# Patient Record
Sex: FEMALE | Race: WHITE | NOT HISPANIC OR LATINO | Employment: OTHER | URBAN - METROPOLITAN AREA
[De-identification: names, ages, dates, MRNs, and addresses within clinical notes are randomized per-mention and may not be internally consistent; named-entity substitution may affect disease eponyms.]

---

## 2017-03-07 ENCOUNTER — AMBULATORY - HEALTHEAST (OUTPATIENT)
Dept: CARE COORDINATION | Facility: CLINIC | Age: 82
End: 2017-03-07

## 2017-04-27 ENCOUNTER — COMMUNICATION - HEALTHEAST (OUTPATIENT)
Dept: SCHEDULING | Facility: CLINIC | Age: 82
End: 2017-04-27

## 2017-05-21 ENCOUNTER — COMMUNICATION - HEALTHEAST (OUTPATIENT)
Dept: FAMILY MEDICINE | Facility: CLINIC | Age: 82
End: 2017-05-21

## 2017-05-21 DIAGNOSIS — I10 UNSPECIFIED ESSENTIAL HYPERTENSION: ICD-10-CM

## 2017-05-21 DIAGNOSIS — E78.5 HYPERLIPIDEMIA: ICD-10-CM

## 2017-08-22 ENCOUNTER — COMMUNICATION - HEALTHEAST (OUTPATIENT)
Dept: FAMILY MEDICINE | Facility: CLINIC | Age: 82
End: 2017-08-22

## 2017-08-22 DIAGNOSIS — E78.5 HYPERLIPIDEMIA: ICD-10-CM

## 2017-08-22 DIAGNOSIS — I10 UNSPECIFIED ESSENTIAL HYPERTENSION: ICD-10-CM

## 2017-08-23 RX ORDER — HYDROCHLOROTHIAZIDE 25 MG/1
TABLET ORAL
Qty: 30 TABLET | Refills: 0 | Status: SHIPPED | OUTPATIENT
Start: 2017-08-23 | End: 2021-09-07

## 2017-09-07 ENCOUNTER — RECORDS - HEALTHEAST (OUTPATIENT)
Dept: ADMINISTRATIVE | Facility: OTHER | Age: 82
End: 2017-09-07

## 2019-04-01 ENCOUNTER — OFFICE VISIT - HEALTHEAST (OUTPATIENT)
Dept: CARDIOLOGY | Facility: CLINIC | Age: 84
End: 2019-04-01

## 2019-04-01 DIAGNOSIS — I48.0 PAROXYSMAL ATRIAL FIBRILLATION (H): ICD-10-CM

## 2019-04-01 ASSESSMENT — MIFFLIN-ST. JEOR: SCORE: 1045.04

## 2019-04-02 LAB
ATRIAL RATE - MUSE: 71 BPM
DIASTOLIC BLOOD PRESSURE - MUSE: NORMAL MMHG
INTERPRETATION ECG - MUSE: NORMAL
P AXIS - MUSE: -20 DEGREES
PR INTERVAL - MUSE: 148 MS
QRS DURATION - MUSE: 92 MS
QT - MUSE: 398 MS
QTC - MUSE: 432 MS
R AXIS - MUSE: 5 DEGREES
SYSTOLIC BLOOD PRESSURE - MUSE: NORMAL MMHG
T AXIS - MUSE: 51 DEGREES
VENTRICULAR RATE- MUSE: 71 BPM

## 2019-07-23 ENCOUNTER — RECORDS - HEALTHEAST (OUTPATIENT)
Dept: ADMINISTRATIVE | Facility: OTHER | Age: 84
End: 2019-07-23

## 2021-05-01 ENCOUNTER — HEALTH MAINTENANCE LETTER (OUTPATIENT)
Age: 86
End: 2021-05-01

## 2021-05-25 ENCOUNTER — RECORDS - HEALTHEAST (OUTPATIENT)
Dept: ADMINISTRATIVE | Facility: CLINIC | Age: 86
End: 2021-05-25

## 2021-05-26 ENCOUNTER — RECORDS - HEALTHEAST (OUTPATIENT)
Dept: ADMINISTRATIVE | Facility: CLINIC | Age: 86
End: 2021-05-26

## 2021-05-27 ENCOUNTER — RECORDS - HEALTHEAST (OUTPATIENT)
Dept: ADMINISTRATIVE | Facility: CLINIC | Age: 86
End: 2021-05-27

## 2021-05-28 ENCOUNTER — RECORDS - HEALTHEAST (OUTPATIENT)
Dept: ADMINISTRATIVE | Facility: CLINIC | Age: 86
End: 2021-05-28

## 2021-05-29 ENCOUNTER — RECORDS - HEALTHEAST (OUTPATIENT)
Dept: ADMINISTRATIVE | Facility: CLINIC | Age: 86
End: 2021-05-29

## 2021-05-30 ENCOUNTER — RECORDS - HEALTHEAST (OUTPATIENT)
Dept: ADMINISTRATIVE | Facility: CLINIC | Age: 86
End: 2021-05-30

## 2021-05-31 ENCOUNTER — RECORDS - HEALTHEAST (OUTPATIENT)
Dept: ADMINISTRATIVE | Facility: CLINIC | Age: 86
End: 2021-05-31

## 2021-06-01 ENCOUNTER — RECORDS - HEALTHEAST (OUTPATIENT)
Dept: ADMINISTRATIVE | Facility: CLINIC | Age: 86
End: 2021-06-01

## 2021-06-02 VITALS — WEIGHT: 154 LBS | HEIGHT: 60 IN | BODY MASS INDEX: 30.23 KG/M2

## 2021-06-16 PROBLEM — I48.91 ATRIAL FIBRILLATION (H): Status: ACTIVE | Noted: 2019-02-18

## 2021-06-27 NOTE — PROGRESS NOTES
Progress Notes by Whit Jacobo MD at 4/1/2019  1:30 PM     Author: Whit Jacobo MD Service: -- Author Type: Physician    Filed: 4/1/2019  1:40 PM Encounter Date: 4/1/2019 Status: Signed    : Whit Jacobo MD (Physician)                  [email or web address]  [phone]         Thank you Dr. Brown for asking the [facility] team to participate in the care of your patient, Tia Brooke.     Impression and Plan     1.  Atrial fibrillation (paroxysmal).  Patient developed a transient episode of atrial fibrillation mid February 2019 in setting of exacerbation of asthma/reactive airway disease.  Patient states she had an episode of atrial fibrillation approximately 2 years prior.  Patient/family has been reluctant to pursue full anticoagulation due to instability concerns and advanced age.  Patient has had no subjective recurrence and does indicate to me that she can pretty much tell when she has the atrial fibrillation.  Twelve-lead ECG today confirms maintenance of sinus rhythm.  At this time, we will plan on seeing Emma on an as-needed basis.  She and her daughter, Rufina, are comfortable with this plan.         History of Present Illness    Once again I would like to thank you again for asking me to participate in the care of your patient, Tia Brooke.  As you know, but to reiterate for my own records, Tia Brooke is a 87 y.o. female with history of paroxysmal atrial fibrillation.  Patient developed a transient episode of atrial fibrillation mid February 2019 in setting of exacerbation of asthma/reactive airway disease.    Patient reports no subjective recurrence.  She does indicate to me that she can pretty much tell when she is in the rhythm disturbance.  She states her breathing is comfortable.  She reports no lightheadedness.  Denies chest pain.    Further review of systems is otherwise negative/noncontributory (medical record and 13  point review of systems reviewed as well and pertinent positives noted).         Cardiac Diagnostics      Echocardiogram 9 January 2019 (performed at [facility]).  1. Normal left ventricular size and systolic performance with ejection fraction of 60-65%.  2. Mild ventricular septal hypertrophy.  3. Mild aortic insufficiency.  4. Normal right ventricular size and systolic performance.  5. Normal atrial dimensions.     Twelve-lead ECG (personally reviewed) 1 April 2019: Sinus rhythm with solitary PVC.    Twelve-lead ECG (personally reviewed) 18 February 2019: Atrial fibrillation with heart rate of 150 bpm.  Nonspecific T wave changes.            Physical Examination       /70 (Patient Site: Right Arm, Patient Position: Sitting, Cuff Size: Adult Regular)   Pulse 92   Resp 16   Ht 5' (1.524 m)   Wt 154 lb (69.9 kg)   BMI 30.08 kg/m          Wt Readings from Last 3 Encounters:   04/01/19 154 lb (69.9 kg)   02/19/19 157 lb (71.2 kg)   07/28/16 163 lb 8 oz (74.2 kg)     The patient is alert and oriented times three. Sclerae are anicteric. Mucosal membranes are moist. Jugular venous pressure is normal. No significant adenopathy/thyromegally appreciated. Lungs are clear with good expansion. On cardiovascular exam, the patient has a regular S1 and S2. Abdomen is soft and non-tender. Extremities reveal no clubbing, cyanosis, or edema.         Family History/Social History/Risk Factors   Patient does not smoke.  Daughter has a history of atrial fibrillation with prior ablation.         Medications  Allergies   Current Outpatient Medications   Medication Sig Dispense Refill   ? aspirin 81 MG EC tablet Take 81 mg by mouth daily.     ? atorvastatin (LIPITOR) 20 MG tablet Take 20 mg by mouth at bedtime.            ? CALCIUM CARBONATE/VITAMIN D3 (CALCIUM 600 + D,3, ORAL) Take 1 tablet by mouth Daily at 6:00 am.            ? cholecalciferol, vitamin D3, 1,000 unit tablet Take 1,000 Units by mouth daily.     ? fluticasone  (FLOVENT HFA) 44 mcg/actuation inhaler Inhale 1 puff Daily at 6:00 am.     ? GLUC DELGADO/CHONDRO DELGADO A/VIT C/MN (GLUCOSAMINE 1500 COMPLEX ORAL) Take 1 tablet by mouth daily.     ? hydroCHLOROthiazide (HYDRODIURIL) 25 MG tablet TAKE 1 TABLET DAILY 30 tablet 0   ? ipratropium-albuterol (DUO-NEB) 0.5-2.5 mg/3 mL nebulizer Inhale 3 mL every 6 (six) hours as needed. 40 vial 1   ? montelukast (SINGULAIR) 10 mg tablet Take 10 mg by mouth bedtime.      ? omega-3 fatty acids-fish oil (FISH OIL) 360-1,200 mg cap Take 1 capsule by mouth daily.     ? albuterol (PROAIR HFA) 90 mcg/actuation inhaler Inhale 2 puffs every 4 (four) hours as needed. 2 puffs every 4-6 hours as needed            ? predniSONE (DELTASONE) 10 mg tablet 40 mg for 3 days, taper by 10 mg every 3 days until completely discontinued. 30 tablet 0     No current facility-administered medications for this visit.       No Known Allergies       Lab Results   Lab Results   Component Value Date     02/19/2019    K 4.3 02/19/2019    CL 96 (L) 02/19/2019    CO2 29 02/19/2019    BUN 27 02/19/2019    CREATININE 0.95 02/19/2019    CALCIUM 8.9 02/19/2019     Lab Results   Component Value Date    WBC 16.9 (H) 02/19/2019    HGB 10.3 (L) 02/19/2019    HCT 30.5 (L) 02/19/2019    MCV 94 02/19/2019     (H) 02/19/2019     Lab Results   Component Value Date    CHOL 200 (H) 05/26/2016    TRIG 155 (H) 05/26/2016    HDL 50 05/26/2016    LDLCALC 119 05/26/2016     Lab Results   Component Value Date    INR 0.90 06/05/2016     Lab Results   Component Value Date    BNP 47 02/18/2019     Lab Results   Component Value Date    CKMB 3 03/01/2014    TROPONINI <0.01 02/18/2019    TROPONINI <0.01 03/01/2014     Lab Results   Component Value Date    TSH 0.45 02/19/2019

## 2021-07-13 ENCOUNTER — RECORDS - HEALTHEAST (OUTPATIENT)
Dept: ADMINISTRATIVE | Facility: CLINIC | Age: 86
End: 2021-07-13

## 2021-07-21 ENCOUNTER — RECORDS - HEALTHEAST (OUTPATIENT)
Dept: ADMINISTRATIVE | Facility: CLINIC | Age: 86
End: 2021-07-21

## 2021-09-07 ENCOUNTER — APPOINTMENT (OUTPATIENT)
Dept: CT IMAGING | Facility: CLINIC | Age: 86
DRG: 177 | End: 2021-09-07
Attending: EMERGENCY MEDICINE
Payer: MEDICARE

## 2021-09-07 ENCOUNTER — HOSPITAL ENCOUNTER (INPATIENT)
Facility: CLINIC | Age: 86
LOS: 5 days | Discharge: HOME | DRG: 177 | End: 2021-09-12
Attending: EMERGENCY MEDICINE | Admitting: INTERNAL MEDICINE
Payer: MEDICARE

## 2021-09-07 ENCOUNTER — APPOINTMENT (OUTPATIENT)
Dept: RADIOLOGY | Facility: CLINIC | Age: 86
DRG: 177 | End: 2021-09-07
Attending: EMERGENCY MEDICINE
Payer: MEDICARE

## 2021-09-07 DIAGNOSIS — I48.0 PAROXYSMAL ATRIAL FIBRILLATION (H): ICD-10-CM

## 2021-09-07 DIAGNOSIS — E87.6 HYPOKALEMIA: ICD-10-CM

## 2021-09-07 DIAGNOSIS — J96.01 ACUTE RESPIRATORY FAILURE WITH HYPOXIA (H): ICD-10-CM

## 2021-09-07 DIAGNOSIS — J45.901 EXACERBATION OF PERSISTENT ASTHMA, UNSPECIFIED ASTHMA SEVERITY: Primary | ICD-10-CM

## 2021-09-07 DIAGNOSIS — U07.1 CLINICAL DIAGNOSIS OF COVID-19: ICD-10-CM

## 2021-09-07 LAB
ALBUMIN SERPL-MCNC: 2.5 G/DL (ref 3.5–5)
ALP SERPL-CCNC: 95 U/L (ref 45–120)
ALT SERPL W P-5'-P-CCNC: 22 U/L (ref 0–45)
ANION GAP SERPL CALCULATED.3IONS-SCNC: 12 MMOL/L (ref 5–18)
APTT PPP: 38 SECONDS (ref 22–38)
AST SERPL W P-5'-P-CCNC: 22 U/L (ref 0–40)
ATRIAL RATE - MUSE: 55 BPM
BASE EXCESS BLDV CALC-SCNC: 4.8 MMOL/L
BASOPHILS # BLD AUTO: 0 10E3/UL (ref 0–0.2)
BASOPHILS NFR BLD AUTO: 0 %
BILIRUB DIRECT SERPL-MCNC: 0.3 MG/DL
BILIRUB SERPL-MCNC: 1.1 MG/DL (ref 0–1)
BNP SERPL-MCNC: 161 PG/ML (ref 0–167)
BUN SERPL-MCNC: 14 MG/DL (ref 8–28)
C REACTIVE PROTEIN LHE: 18.9 MG/DL (ref 0–0.8)
CALCIUM SERPL-MCNC: 8.3 MG/DL (ref 8.5–10.5)
CHLORIDE BLD-SCNC: 105 MMOL/L (ref 98–107)
CO2 SERPL-SCNC: 24 MMOL/L (ref 22–31)
CREAT SERPL-MCNC: 0.8 MG/DL (ref 0.6–1.1)
CREAT SERPL-MCNC: 0.86 MG/DL (ref 0.6–1.1)
D DIMER PPP FEU-MCNC: 1.46 UG/ML FEU (ref 0–0.5)
DIASTOLIC BLOOD PRESSURE - MUSE: 70 MMHG
EOSINOPHIL # BLD AUTO: 0.1 10E3/UL (ref 0–0.7)
EOSINOPHIL NFR BLD AUTO: 0 %
ERYTHROCYTE [DISTWIDTH] IN BLOOD BY AUTOMATED COUNT: 13.6 % (ref 10–15)
FIBRINOGEN PPP-MCNC: 1046 MG/DL (ref 170–490)
GFR SERPL CREATININE-BSD FRML MDRD: 60 ML/MIN/1.73M2
GFR SERPL CREATININE-BSD FRML MDRD: 66 ML/MIN/1.73M2
GLUCOSE BLD-MCNC: 122 MG/DL (ref 70–125)
HCO3 BLDV-SCNC: 27 MMOL/L (ref 24–30)
HCT VFR BLD AUTO: 32.1 % (ref 35–47)
HGB BLD-MCNC: 10.4 G/DL (ref 11.7–15.7)
HOLD SPECIMEN: NORMAL
IMM GRANULOCYTES # BLD: 0.2 10E3/UL
IMM GRANULOCYTES NFR BLD: 1 %
INR PPP: 1.1 (ref 0.85–1.15)
INTERPRETATION ECG - MUSE: NORMAL
LDH SERPL L TO P-CCNC: 238 U/L (ref 125–220)
LYMPHOCYTES # BLD AUTO: 3.4 10E3/UL (ref 0.8–5.3)
LYMPHOCYTES NFR BLD AUTO: 21 %
MAGNESIUM SERPL-MCNC: 2.1 MG/DL (ref 1.8–2.6)
MAGNESIUM SERPL-MCNC: 2.9 MG/DL (ref 1.8–2.6)
MCH RBC QN AUTO: 30.8 PG (ref 26.5–33)
MCHC RBC AUTO-ENTMCNC: 32.4 G/DL (ref 31.5–36.5)
MCV RBC AUTO: 95 FL (ref 78–100)
MONOCYTES # BLD AUTO: 0.8 10E3/UL (ref 0–1.3)
MONOCYTES NFR BLD AUTO: 5 %
NEUTROPHILS # BLD AUTO: 11.8 10E3/UL (ref 1.6–8.3)
NEUTROPHILS NFR BLD AUTO: 73 %
NRBC # BLD AUTO: 0 10E3/UL
NRBC BLD AUTO-RTO: 0 /100
OXYHGB MFR BLDV: 27 % (ref 70–75)
P AXIS - MUSE: NORMAL DEGREES
PCO2 BLDV: 44 MM HG (ref 35–50)
PH BLDV: 7.43 [PH] (ref 7.35–7.45)
PLATELET # BLD AUTO: 425 10E3/UL (ref 150–450)
PO2 BLDV: 19 MM HG (ref 25–47)
POTASSIUM BLD-SCNC: 2.9 MMOL/L (ref 3.5–5)
POTASSIUM BLD-SCNC: 4 MMOL/L (ref 3.5–5)
PR INTERVAL - MUSE: 150 MS
PROCALCITONIN SERPL-MCNC: 0.14 NG/ML (ref 0–0.49)
PROT SERPL-MCNC: 6.7 G/DL (ref 6–8)
QRS DURATION - MUSE: 94 MS
QT - MUSE: 464 MS
QTC - MUSE: 443 MS
R AXIS - MUSE: -5 DEGREES
RBC # BLD AUTO: 3.38 10E6/UL (ref 3.8–5.2)
SAO2 % BLDV: 27.6 % (ref 70–75)
SARS-COV-2 RNA RESP QL NAA+PROBE: POSITIVE
SODIUM SERPL-SCNC: 141 MMOL/L (ref 136–145)
SYSTOLIC BLOOD PRESSURE - MUSE: 157 MMHG
T AXIS - MUSE: 53 DEGREES
TROPONIN I SERPL-MCNC: 0.02 NG/ML (ref 0–0.29)
TROPONIN I SERPL-MCNC: 0.02 NG/ML (ref 0–0.29)
TROPONIN I SERPL-MCNC: <0.01 NG/ML (ref 0–0.29)
VENTRICULAR RATE- MUSE: 55 BPM
WBC # BLD AUTO: 16.2 10E3/UL (ref 4–11)

## 2021-09-07 PROCEDURE — 36415 COLL VENOUS BLD VENIPUNCTURE: CPT | Performed by: STUDENT IN AN ORGANIZED HEALTH CARE EDUCATION/TRAINING PROGRAM

## 2021-09-07 PROCEDURE — 87205 SMEAR GRAM STAIN: CPT | Performed by: INTERNAL MEDICINE

## 2021-09-07 PROCEDURE — C9803 HOPD COVID-19 SPEC COLLECT: HCPCS

## 2021-09-07 PROCEDURE — U0005 INFEC AGEN DETEC AMPLI PROBE: HCPCS | Performed by: EMERGENCY MEDICINE

## 2021-09-07 PROCEDURE — 99285 EMERGENCY DEPT VISIT HI MDM: CPT | Mod: 25

## 2021-09-07 PROCEDURE — 82565 ASSAY OF CREATININE: CPT | Performed by: STUDENT IN AN ORGANIZED HEALTH CARE EDUCATION/TRAINING PROGRAM

## 2021-09-07 PROCEDURE — 96375 TX/PRO/DX INJ NEW DRUG ADDON: CPT

## 2021-09-07 PROCEDURE — 84484 ASSAY OF TROPONIN QUANT: CPT | Performed by: STUDENT IN AN ORGANIZED HEALTH CARE EDUCATION/TRAINING PROGRAM

## 2021-09-07 PROCEDURE — 99223 1ST HOSP IP/OBS HIGH 75: CPT | Performed by: INTERNAL MEDICINE

## 2021-09-07 PROCEDURE — 71045 X-RAY EXAM CHEST 1 VIEW: CPT

## 2021-09-07 PROCEDURE — 250N000013 HC RX MED GY IP 250 OP 250 PS 637: Performed by: INTERNAL MEDICINE

## 2021-09-07 PROCEDURE — XW033E5 INTRODUCTION OF REMDESIVIR ANTI-INFECTIVE INTO PERIPHERAL VEIN, PERCUTANEOUS APPROACH, NEW TECHNOLOGY GROUP 5: ICD-10-PCS | Performed by: INTERNAL MEDICINE

## 2021-09-07 PROCEDURE — 250N000013 HC RX MED GY IP 250 OP 250 PS 637: Performed by: EMERGENCY MEDICINE

## 2021-09-07 PROCEDURE — 999N000157 HC STATISTIC RCP TIME EA 10 MIN

## 2021-09-07 PROCEDURE — 80053 COMPREHEN METABOLIC PANEL: CPT | Performed by: EMERGENCY MEDICINE

## 2021-09-07 PROCEDURE — 84132 ASSAY OF SERUM POTASSIUM: CPT | Performed by: INTERNAL MEDICINE

## 2021-09-07 PROCEDURE — 250N000011 HC RX IP 250 OP 636: Performed by: EMERGENCY MEDICINE

## 2021-09-07 PROCEDURE — 83735 ASSAY OF MAGNESIUM: CPT | Performed by: EMERGENCY MEDICINE

## 2021-09-07 PROCEDURE — 86141 C-REACTIVE PROTEIN HS: CPT | Performed by: EMERGENCY MEDICINE

## 2021-09-07 PROCEDURE — 84145 PROCALCITONIN (PCT): CPT | Performed by: EMERGENCY MEDICINE

## 2021-09-07 PROCEDURE — 93005 ELECTROCARDIOGRAM TRACING: CPT | Performed by: EMERGENCY MEDICINE

## 2021-09-07 PROCEDURE — 83615 LACTATE (LD) (LDH) ENZYME: CPT | Performed by: INTERNAL MEDICINE

## 2021-09-07 PROCEDURE — 94640 AIRWAY INHALATION TREATMENT: CPT | Mod: 76

## 2021-09-07 PROCEDURE — 85025 COMPLETE CBC W/AUTO DIFF WBC: CPT | Performed by: EMERGENCY MEDICINE

## 2021-09-07 PROCEDURE — 71275 CT ANGIOGRAPHY CHEST: CPT

## 2021-09-07 PROCEDURE — 250N000013 HC RX MED GY IP 250 OP 250 PS 637: Performed by: STUDENT IN AN ORGANIZED HEALTH CARE EDUCATION/TRAINING PROGRAM

## 2021-09-07 PROCEDURE — 258N000003 HC RX IP 258 OP 636: Performed by: STUDENT IN AN ORGANIZED HEALTH CARE EDUCATION/TRAINING PROGRAM

## 2021-09-07 PROCEDURE — 82805 BLOOD GASES W/O2 SATURATION: CPT | Performed by: EMERGENCY MEDICINE

## 2021-09-07 PROCEDURE — 83735 ASSAY OF MAGNESIUM: CPT | Performed by: INTERNAL MEDICINE

## 2021-09-07 PROCEDURE — 250N000009 HC RX 250: Performed by: INTERNAL MEDICINE

## 2021-09-07 PROCEDURE — 87070 CULTURE OTHR SPECIMN AEROBIC: CPT | Performed by: INTERNAL MEDICINE

## 2021-09-07 PROCEDURE — 96374 THER/PROPH/DIAG INJ IV PUSH: CPT | Mod: 59

## 2021-09-07 PROCEDURE — 85610 PROTHROMBIN TIME: CPT | Performed by: INTERNAL MEDICINE

## 2021-09-07 PROCEDURE — 120N000001 HC R&B MED SURG/OB

## 2021-09-07 PROCEDURE — 83880 ASSAY OF NATRIURETIC PEPTIDE: CPT | Performed by: EMERGENCY MEDICINE

## 2021-09-07 PROCEDURE — 85730 THROMBOPLASTIN TIME PARTIAL: CPT | Performed by: INTERNAL MEDICINE

## 2021-09-07 PROCEDURE — 94640 AIRWAY INHALATION TREATMENT: CPT

## 2021-09-07 PROCEDURE — 84484 ASSAY OF TROPONIN QUANT: CPT | Performed by: EMERGENCY MEDICINE

## 2021-09-07 PROCEDURE — 84484 ASSAY OF TROPONIN QUANT: CPT | Performed by: INTERNAL MEDICINE

## 2021-09-07 PROCEDURE — 250N000009 HC RX 250: Performed by: EMERGENCY MEDICINE

## 2021-09-07 PROCEDURE — 258N000003 HC RX IP 258 OP 636: Performed by: INTERNAL MEDICINE

## 2021-09-07 PROCEDURE — 250N000009 HC RX 250: Performed by: FAMILY MEDICINE

## 2021-09-07 PROCEDURE — 250N000011 HC RX IP 250 OP 636: Performed by: STUDENT IN AN ORGANIZED HEALTH CARE EDUCATION/TRAINING PROGRAM

## 2021-09-07 PROCEDURE — 85379 FIBRIN DEGRADATION QUANT: CPT | Performed by: EMERGENCY MEDICINE

## 2021-09-07 PROCEDURE — 36415 COLL VENOUS BLD VENIPUNCTURE: CPT | Performed by: EMERGENCY MEDICINE

## 2021-09-07 PROCEDURE — 36415 COLL VENOUS BLD VENIPUNCTURE: CPT | Performed by: INTERNAL MEDICINE

## 2021-09-07 PROCEDURE — 85384 FIBRINOGEN ACTIVITY: CPT | Performed by: INTERNAL MEDICINE

## 2021-09-07 RX ORDER — VITAMIN B COMPLEX
1000 TABLET ORAL EVERY MORNING
Status: DISCONTINUED | OUTPATIENT
Start: 2021-09-08 | End: 2021-09-12 | Stop reason: HOSPADM

## 2021-09-07 RX ORDER — POLYETHYLENE GLYCOL 3350 17 G/17G
17 POWDER, FOR SOLUTION ORAL DAILY PRN
Status: DISCONTINUED | OUTPATIENT
Start: 2021-09-07 | End: 2021-09-12 | Stop reason: HOSPADM

## 2021-09-07 RX ORDER — DEXAMETHASONE SODIUM PHOSPHATE 10 MG/ML
6 INJECTION, SOLUTION INTRAMUSCULAR; INTRAVENOUS DAILY
Status: DISCONTINUED | OUTPATIENT
Start: 2021-09-08 | End: 2021-09-12 | Stop reason: HOSPADM

## 2021-09-07 RX ORDER — ATORVASTATIN CALCIUM 10 MG/1
20 TABLET, FILM COATED ORAL AT BEDTIME
Status: DISCONTINUED | OUTPATIENT
Start: 2021-09-07 | End: 2021-09-12 | Stop reason: HOSPADM

## 2021-09-07 RX ORDER — FUROSEMIDE 20 MG
20 TABLET ORAL EVERY MORNING
COMMUNITY

## 2021-09-07 RX ORDER — IPRATROPIUM BROMIDE AND ALBUTEROL SULFATE 2.5; .5 MG/3ML; MG/3ML
3 SOLUTION RESPIRATORY (INHALATION) 2 TIMES DAILY
Status: DISCONTINUED | OUTPATIENT
Start: 2021-09-07 | End: 2021-09-07

## 2021-09-07 RX ORDER — IPRATROPIUM BROMIDE AND ALBUTEROL SULFATE 2.5; .5 MG/3ML; MG/3ML
3 SOLUTION RESPIRATORY (INHALATION) ONCE
Status: COMPLETED | OUTPATIENT
Start: 2021-09-07 | End: 2021-09-07

## 2021-09-07 RX ORDER — AZITHROMYCIN 500 MG/5ML
500 INJECTION, POWDER, LYOPHILIZED, FOR SOLUTION INTRAVENOUS EVERY 24 HOURS
Status: DISCONTINUED | OUTPATIENT
Start: 2021-09-07 | End: 2021-09-10

## 2021-09-07 RX ORDER — IBUPROFEN 200 MG
200-400 TABLET ORAL EVERY 4 HOURS PRN
Status: ON HOLD | COMMUNITY
End: 2021-09-12

## 2021-09-07 RX ORDER — IOPAMIDOL 755 MG/ML
100 INJECTION, SOLUTION INTRAVASCULAR ONCE
Status: COMPLETED | OUTPATIENT
Start: 2021-09-07 | End: 2021-09-07

## 2021-09-07 RX ORDER — ONDANSETRON 4 MG/1
4 TABLET, ORALLY DISINTEGRATING ORAL EVERY 6 HOURS PRN
Status: DISCONTINUED | OUTPATIENT
Start: 2021-09-07 | End: 2021-09-12 | Stop reason: HOSPADM

## 2021-09-07 RX ORDER — CEFTRIAXONE 1 G/1
1 INJECTION, POWDER, FOR SOLUTION INTRAMUSCULAR; INTRAVENOUS EVERY 24 HOURS
Status: DISCONTINUED | OUTPATIENT
Start: 2021-09-07 | End: 2021-09-10

## 2021-09-07 RX ORDER — MONTELUKAST SODIUM 10 MG/1
10 TABLET ORAL AT BEDTIME
Status: DISCONTINUED | OUTPATIENT
Start: 2021-09-07 | End: 2021-09-12 | Stop reason: HOSPADM

## 2021-09-07 RX ORDER — ACETAMINOPHEN 650 MG/1
650 SUPPOSITORY RECTAL EVERY 6 HOURS PRN
Status: DISCONTINUED | OUTPATIENT
Start: 2021-09-07 | End: 2021-09-12 | Stop reason: HOSPADM

## 2021-09-07 RX ORDER — ONDANSETRON 2 MG/ML
4 INJECTION INTRAMUSCULAR; INTRAVENOUS EVERY 6 HOURS PRN
Status: DISCONTINUED | OUTPATIENT
Start: 2021-09-07 | End: 2021-09-12 | Stop reason: HOSPADM

## 2021-09-07 RX ORDER — IPRATROPIUM BROMIDE AND ALBUTEROL SULFATE 2.5; .5 MG/3ML; MG/3ML
3 SOLUTION RESPIRATORY (INHALATION) EVERY 6 HOURS PRN
Status: DISCONTINUED | OUTPATIENT
Start: 2021-09-07 | End: 2021-09-07

## 2021-09-07 RX ORDER — POTASSIUM CHLORIDE 1.5 G/1.58G
40 POWDER, FOR SOLUTION ORAL ONCE
Status: COMPLETED | OUTPATIENT
Start: 2021-09-07 | End: 2021-09-07

## 2021-09-07 RX ORDER — IPRATROPIUM BROMIDE AND ALBUTEROL SULFATE 2.5; .5 MG/3ML; MG/3ML
3 SOLUTION RESPIRATORY (INHALATION)
Status: DISCONTINUED | OUTPATIENT
Start: 2021-09-08 | End: 2021-09-09

## 2021-09-07 RX ORDER — ACETAMINOPHEN 325 MG/1
650 TABLET ORAL EVERY 6 HOURS PRN
Status: DISCONTINUED | OUTPATIENT
Start: 2021-09-07 | End: 2021-09-12 | Stop reason: HOSPADM

## 2021-09-07 RX ORDER — PROCHLORPERAZINE MALEATE 5 MG
5 TABLET ORAL EVERY 6 HOURS PRN
Status: DISCONTINUED | OUTPATIENT
Start: 2021-09-07 | End: 2021-09-12 | Stop reason: HOSPADM

## 2021-09-07 RX ORDER — IPRATROPIUM BROMIDE AND ALBUTEROL SULFATE 2.5; .5 MG/3ML; MG/3ML
3 SOLUTION RESPIRATORY (INHALATION)
Status: DISCONTINUED | OUTPATIENT
Start: 2021-09-07 | End: 2021-09-07

## 2021-09-07 RX ORDER — FUROSEMIDE 20 MG
20 TABLET ORAL EVERY MORNING
Status: DISCONTINUED | OUTPATIENT
Start: 2021-09-08 | End: 2021-09-12 | Stop reason: HOSPADM

## 2021-09-07 RX ORDER — LIDOCAINE 40 MG/G
CREAM TOPICAL
Status: DISCONTINUED | OUTPATIENT
Start: 2021-09-07 | End: 2021-09-12 | Stop reason: HOSPADM

## 2021-09-07 RX ORDER — ASPIRIN 81 MG/1
81 TABLET ORAL DAILY
Status: DISCONTINUED | OUTPATIENT
Start: 2021-09-07 | End: 2021-09-12 | Stop reason: HOSPADM

## 2021-09-07 RX ORDER — ACETYLCYSTEINE 200 MG/ML
600 SOLUTION ORAL; RESPIRATORY (INHALATION) 2 TIMES DAILY
Status: DISCONTINUED | OUTPATIENT
Start: 2021-09-07 | End: 2021-09-08

## 2021-09-07 RX ORDER — IPRATROPIUM BROMIDE AND ALBUTEROL SULFATE 2.5; .5 MG/3ML; MG/3ML
3 SOLUTION RESPIRATORY (INHALATION) EVERY 4 HOURS PRN
Status: DISCONTINUED | OUTPATIENT
Start: 2021-09-07 | End: 2021-09-07

## 2021-09-07 RX ORDER — PANTOPRAZOLE SODIUM 20 MG/1
40 TABLET, DELAYED RELEASE ORAL
Status: DISCONTINUED | OUTPATIENT
Start: 2021-09-08 | End: 2021-09-12 | Stop reason: HOSPADM

## 2021-09-07 RX ORDER — DEXAMETHASONE SODIUM PHOSPHATE 4 MG/ML
6 INJECTION, SOLUTION INTRA-ARTICULAR; INTRALESIONAL; INTRAMUSCULAR; INTRAVENOUS; SOFT TISSUE ONCE
Status: COMPLETED | OUTPATIENT
Start: 2021-09-07 | End: 2021-09-07

## 2021-09-07 RX ORDER — ALBUTEROL SULFATE 90 UG/1
2 AEROSOL, METERED RESPIRATORY (INHALATION)
Status: DISCONTINUED | OUTPATIENT
Start: 2021-09-07 | End: 2021-09-12 | Stop reason: HOSPADM

## 2021-09-07 RX ORDER — PROCHLORPERAZINE 25 MG
12.5 SUPPOSITORY, RECTAL RECTAL EVERY 12 HOURS PRN
Status: DISCONTINUED | OUTPATIENT
Start: 2021-09-07 | End: 2021-09-12 | Stop reason: HOSPADM

## 2021-09-07 RX ORDER — ONDANSETRON 2 MG/ML
4 INJECTION INTRAMUSCULAR; INTRAVENOUS ONCE
Status: COMPLETED | OUTPATIENT
Start: 2021-09-07 | End: 2021-09-07

## 2021-09-07 RX ADMIN — SODIUM CHLORIDE, PRESERVATIVE FREE 50 ML: 5 INJECTION INTRAVENOUS at 19:51

## 2021-09-07 RX ADMIN — Medication 1 TABLET: at 18:03

## 2021-09-07 RX ADMIN — IPRATROPIUM BROMIDE AND ALBUTEROL SULFATE 3 ML: .5; 3 SOLUTION RESPIRATORY (INHALATION) at 08:48

## 2021-09-07 RX ADMIN — CEFTRIAXONE 1 G: 1 INJECTION, POWDER, FOR SOLUTION INTRAMUSCULAR; INTRAVENOUS at 17:27

## 2021-09-07 RX ADMIN — DEXAMETHASONE SODIUM PHOSPHATE 6 MG: 4 INJECTION, SOLUTION INTRAMUSCULAR; INTRAVENOUS at 11:36

## 2021-09-07 RX ADMIN — AZITHROMYCIN MONOHYDRATE 500 MG: 500 INJECTION, POWDER, LYOPHILIZED, FOR SOLUTION INTRAVENOUS at 18:03

## 2021-09-07 RX ADMIN — ONDANSETRON 4 MG: 2 INJECTION INTRAMUSCULAR; INTRAVENOUS at 09:25

## 2021-09-07 RX ADMIN — ENOXAPARIN SODIUM 40 MG: 100 INJECTION SUBCUTANEOUS at 17:26

## 2021-09-07 RX ADMIN — ASPIRIN 81 MG: 81 TABLET, DELAYED RELEASE ORAL at 18:03

## 2021-09-07 RX ADMIN — MONTELUKAST 10 MG: 10 TABLET ORAL at 21:00

## 2021-09-07 RX ADMIN — ATORVASTATIN CALCIUM 20 MG: 10 TABLET, FILM COATED ORAL at 21:00

## 2021-09-07 RX ADMIN — IOPAMIDOL 100 ML: 755 INJECTION, SOLUTION INTRAVENOUS at 10:21

## 2021-09-07 RX ADMIN — IPRATROPIUM BROMIDE AND ALBUTEROL SULFATE 3 ML: .5; 3 SOLUTION RESPIRATORY (INHALATION) at 20:24

## 2021-09-07 RX ADMIN — Medication 1 MG: at 21:05

## 2021-09-07 RX ADMIN — REMDESIVIR 200 MG: 100 INJECTION, POWDER, LYOPHILIZED, FOR SOLUTION INTRAVENOUS at 19:02

## 2021-09-07 RX ADMIN — POTASSIUM CHLORIDE 40 MEQ: 1.5 POWDER, FOR SOLUTION ORAL at 12:43

## 2021-09-07 RX ADMIN — ACETYLCYSTEINE 600 MG: 200 SOLUTION ORAL; RESPIRATORY (INHALATION) at 20:24

## 2021-09-07 ASSESSMENT — ENCOUNTER SYMPTOMS
SHORTNESS OF BREATH: 1
FATIGUE: 1
HEADACHES: 0
COUGH: 1
COLOR CHANGE: 0
EYE REDNESS: 0
DIFFICULTY URINATING: 0
RHINORRHEA: 0
ARTHRALGIAS: 0
CONFUSION: 0
ABDOMINAL PAIN: 0
MYALGIAS: 0
FEVER: 0
DIAPHORESIS: 0
NECK STIFFNESS: 0

## 2021-09-07 ASSESSMENT — ACTIVITIES OF DAILY LIVING (ADL)
DRESSING/BATHING_DIFFICULTY: NO
FALL_HISTORY_WITHIN_LAST_SIX_MONTHS: NO
WALKING_OR_CLIMBING_STAIRS_DIFFICULTY: NO
DOING_ERRANDS_INDEPENDENTLY_DIFFICULTY: NO
DIFFICULTY_EATING/SWALLOWING: OTHER (SEE COMMENTS)
EQUIPMENT_CURRENTLY_USED_AT_HOME: WALKER, ROLLING
DIFFICULTY_COMMUNICATING: OTHER (SEE COMMENTS)
USE_OF_HEARING_ASSISTIVE_DEVICES: RIGHT HEARING AID;LEFT HEARING AID
WERE_AUXILIARY_AIDS_OFFERED?: NO
THE_FOLLOWING_AIDS_WERE_PROVIDED;: PATIENT DECLINED OFFER OF AUXILIARY AIDS
HEARING_DIFFICULTY_OR_DEAF: YES
WEAR_GLASSES_OR_BLIND: YES
TOILETING_ISSUES: NO
PATIENT'S_PREFERRED_MEANS_OF_COMMUNICATION: OTHER
DESCRIBE_HEARING_LOSS: HEARING LOSS ON RIGHT SIDE;HEARING LOSS ON LEFT SIDE
DEPENDENT_IADLS:: INDEPENDENT
CONCENTRATING,_REMEMBERING_OR_MAKING_DECISIONS_DIFFICULTY: NO

## 2021-09-07 ASSESSMENT — MIFFLIN-ST. JEOR: SCORE: 1049.58

## 2021-09-07 NOTE — ED PROVIDER NOTES
EMERGENCY DEPARTMENT ENCOUNTER      NAME: Tia Brooke  AGE: 89 year old female  YOB: 1932  MRN: [id number]  EVALUATION DATE & TIME: 9/7/2021  8:16 AM    PCP: Sunil Brown    ED PROVIDER: Dashawn Schumacher M.D.      Chief Complaint   Patient presents with     Shortness of Breath         IMPRESSION  1. Clinical diagnosis of COVID-19    2. Acute respiratory failure with hypoxia (H)    3. Hypokalemia        PLAN  - admit to hospitalist for further care; med/surg admit    ED COURSE & MEDICAL DECISION MAKING    ED Course as of Sep 07 1302   Tue Sep 07, 2021   0838 89yoF with history of COPD, HTN, HLD, Gilbert's, paroxysmal a-fib (not on AC) presenting from home (lives with her son) with her daughter (who lives 1 mile away) for evaluation of cough and shortness of breath.  Reports shortness of breath and white-productive cough cough started yesterday and ongoing overnight; unable to sleep due to coughing. Reports increased fatigue this morning; no fevers, sweats, chills, runny nose, nasal congestion. No pains of any sort. Notes her son (lives at home) had COVID-19 a couple weeks ago but tested negative a couple days ago; no ongoing symptoms. Use an inhaler without relief this morning. States she was feeling ok yesterday morning. Denies any leg pain/swelling, any other problems or complaints at this time.    BP 170s/70s on presentation with otherwise normal vitals. Exam with normal work of breathing with mild diffuse wheezing and intermittent mild cough during exam, no stridor, normal phonation, no peripheral edema or calf tenderness, benign abdomen, no meningismus, moist mucous membranes, no rash. COPD exacerbation, COVID-19, pneumonia top the differential. Screening triage EKG with no ischemic changes; doubt primary cardiac etiology or ACS clinically. Will keep as PUI while giving Duonebs as workup obtained including COVID-19 swab, CXR, blood tests. Patient and daughter comfortable with this  plan; no further questions at this time.      1231 Workup concerning for COVID-19 pneumonia; CT with bilateral GGO and CRP 18.9 with WBC 16; procal pending but CT more suggestive of viral pneumonia than bacterial. Suspect COVID-19 especially with recent home exposure. Doubt benefit to antibiotics at this time. Did desat to mid 80%s with ambulation here in the ED; normalized on 2L NC. Feeling improved on recheck with normal work of breathing at this time; stable for floor. Given Decadron for COVID-19 with hypoxemia. Workup otherwise with negative troponin, BNP within normal limits, normal kidney function, no glaring electrolyte abnormality (mild low K at 2.9 replaced PO), VBG with no retention. Consulted hospitalist for admission; they agreed. Patient understood and agreed with the plan; no further questions at the time of admission.          8:38 I met with the patient for the initial interview and physical examination. Discussed plan for treatment and workup in the ED.      I wore the following PPE during this patient encounter:  N95 mask, face shield w/ eye protection, gloves, gown    MEDICATIONS GIVEN IN THE EMERGENCY DEPARTMENT  Medications   ipratropium - albuterol 0.5 mg/2.5 mg/3 mL (DUONEB) neb solution 3 mL (3 mLs Nebulization Given 9/7/21 0848)   ipratropium - albuterol 0.5 mg/2.5 mg/3 mL (DUONEB) neb solution 3 mL (3 mLs Nebulization Given 9/7/21 0848)   ondansetron (ZOFRAN) injection 4 mg (4 mg Intravenous Given 9/7/21 0925)   iopamidol (ISOVUE-370) solution 100 mL (100 mLs Intravenous Given 9/7/21 1021)   dexamethasone (DECADRON) injection 6 mg (6 mg Intravenous Given 9/7/21 1136)   potassium chloride (KLOR-CON) Packet 40 mEq (40 mEq Oral Given 9/7/21 1243)               =================================================================      HPI  Patient information was obtained from: the patient    Use of : N/A        Tia RONNELL Brooke is a 89 year old female with a pertinent history of  COPD, HTN, HLD, Gilbert's, paroxysmal a-fib (not on AC) who presents to this ED by walk in for evaluation of cough, shortness of breath. Reports shortness of breath and white-productive cough started yesterday and ongoing overnight; unable to sleep due to coughing. Reports increased fatigue this morning; no fevers, sweats, chills, runny nose, nasal congestion. No pains of any sort. Notes her son (lives at home) had COVID-19 a couple weeks ago but tested negative a couple days ago; no ongoing symptoms. Use an inhaler without relief this morning. States she was feeling ok yesterday morning. Denies any leg pain/swelling, any other problems or complaints at this time.      REVIEW OF SYSTEMS   Review of Systems   Constitutional: Positive for fatigue. Negative for diaphoresis and fever.   HENT: Negative for congestion and rhinorrhea.    Eyes: Negative for redness.   Respiratory: Positive for cough (productive, white) and shortness of breath.    Cardiovascular: Negative for chest pain and leg swelling.   Gastrointestinal: Negative for abdominal pain.   Genitourinary: Negative for difficulty urinating.   Musculoskeletal: Negative for arthralgias, myalgias and neck stiffness.        Negative for leg pain.   Skin: Negative for color change.   Neurological: Negative for headaches.   Psychiatric/Behavioral: Negative for confusion.   All other systems reviewed and are negative.          --------------- MEDICAL HISTORY ---------------    PAST MEDICAL HISTORY:  Patient Active Problem List   Diagnosis     Osteopenia     Hyperlipidemia     Hypertension     Chronic Obstructive Pulmonary Disease     Colon Cancer     Osteoarthritis     Gilbert's Syndrome     Obesity     Hearing Loss     Joint Pain, Localized In The Knee     Low back pain     Macular degeneration     Atrial fibrillation (H)     Exacerbation of persistent asthma, unspecified asthma severity     Acute respiratory failure with hypoxia (H)     Clinical diagnosis of COVID-19        PAST SURGICAL HISTORY:  Past Surgical History:   Procedure Laterality Date     COLECTOMY PARTIAL  11/01/2004    Sigmoid colectomy for adenocarcinoma     PHACOEMULSIFICATION CLEAR CORNEA WITH STANDARD INTRAOCULAR LENS IMPLANT Bilateral      TOTAL HIP ARTHROPLASTY Right 12/31/2009    Dr. Gurpreet Ho       CURRENT MEDICATIONS:    No current facility-administered medications for this encounter.    Current Outpatient Medications:      albuterol (PROAIR HFA) 90 mcg/actuation inhaler, [ALBUTEROL (PROAIR HFA) 90 MCG/ACTUATION INHALER] Inhale 2 puffs every 4 (four) hours as needed. 2 puffs every 4-6 hours as needed       , Disp: , Rfl:      ascorbic acid (VITAMIN C) 500 MG CPCR CR capsule, Take 500 mg by mouth every morning, Disp: , Rfl:      aspirin 81 MG EC tablet, [ASPIRIN 81 MG EC TABLET] Take 81 mg by mouth daily., Disp: , Rfl:      atorvastatin (LIPITOR) 20 MG tablet, [ATORVASTATIN (LIPITOR) 20 MG TABLET] Take 20 mg by mouth at bedtime.       , Disp: , Rfl:      CALCIUM CARBONATE/VITAMIN D3 (CALCIUM 600 + D,3, ORAL), Take 1 tablet by mouth every morning , Disp: , Rfl:      cholecalciferol, vitamin D3, 1,000 unit tablet, Take 1,000 Units by mouth every morning , Disp: , Rfl:      fluticasone (ARNUITY ELLIPTA) 100 MCG/ACT inhaler, Inhale 1 puff into the lungs every morning, Disp: , Rfl:      furosemide (LASIX) 20 MG tablet, Take 20 mg by mouth every morning, Disp: , Rfl:      GLUC DELGADO/CHONDRO DELGADO A/VIT C/MN (GLUCOSAMINE 1500 COMPLEX ORAL), Take 1 tablet by mouth every morning , Disp: , Rfl:      ibuprofen (ADVIL/MOTRIN) 200 MG tablet, Take 200-400 mg by mouth every 4 hours as needed for mild pain, Disp: , Rfl:      ipratropium-albuterol (DUO-NEB) 0.5-2.5 mg/3 mL nebulizer, [IPRATROPIUM-ALBUTEROL (DUO-NEB) 0.5-2.5 MG/3 ML NEBULIZER] Inhale 3 mL every 6 (six) hours as needed., Disp: 40 vial, Rfl: 1     montelukast (SINGULAIR) 10 mg tablet, [MONTELUKAST (SINGULAIR) 10 MG TABLET] Take 10 mg by mouth bedtime. , Disp: , Rfl:  "     omega-3 fatty acids-fish oil (FISH OIL) 360-1,200 mg cap, Take 1 capsule by mouth every morning , Disp: , Rfl:      UNABLE TO FIND, Take 2 tablets by mouth every morning MEDICATION NAME: Focus eye vitamins, Disp: , Rfl:     ALLERGIES:  No Known Allergies    FAMILY HISTORY:  Family History   Problem Relation Age of Onset     Leukemia Mother      Heart Failure Mother          age 52 of \"enlarged heart\"     Chronic Obstructive Pulmonary Disease Father      Heart Disease Brother          age 62, unspecified heart problems     Hypertension Daughter      Hyperlipidemia Daughter      Atrial fibrillation Daughter        SOCIAL HISTORY:   Social History     Socioeconomic History     Marital status:      Spouse name: Not on file     Number of children: 4     Years of education: Not on file     Highest education level: Not on file   Occupational History     Not on file   Tobacco Use     Smoking status: Never Smoker     Smokeless tobacco: Never Used   Substance and Sexual Activity     Alcohol use: Yes     Comment: Alcoholic Drinks/day: 0-1 per month     Drug use: No     Sexual activity: Never   Other Topics Concern     Not on file   Social History Narrative    ;   in 1970's age 44, had open heart surgery then brain aneurysm.  2 daughters (Genesis Tello and Alda Tello), 2 sons.  Granddaughter Jocelin Curry is an RN.     Social Determinants of Health     Financial Resource Strain:      Difficulty of Paying Living Expenses:    Food Insecurity:      Worried About Running Out of Food in the Last Year:      Ran Out of Food in the Last Year:    Transportation Needs:      Lack of Transportation (Medical):      Lack of Transportation (Non-Medical):    Physical Activity:      Days of Exercise per Week:      Minutes of Exercise per Session:    Stress:      Feeling of Stress :    Social Connections:      Frequency of Communication with Friends and Family:      Frequency of Social Gatherings with " Friends and Family:      Attends Jain Services:      Active Member of Clubs or Organizations:      Attends Club or Organization Meetings:      Marital Status:    Intimate Partner Violence:      Fear of Current or Ex-Partner:      Emotionally Abused:      Physically Abused:      Sexually Abused:          --------------- PHYSICAL EXAM ---------------  VITALS:  BP (!) 142/65   Pulse 72   Temp 98  F (36.7  C) (Oral)   Resp 19   Wt 70.3 kg (155 lb)   SpO2 99%   BMI 30.27 kg/m      PHYSICAL EXAM:    General:  alert, interactive, no distress  Eyes:  conjunctivae clear, conjugate gaze   HENT:  atraumatic, nose with no rhinorrhea, oropharynx clear  Neck:  no meningismus  Cardiovascular:  HR in the 50s during exam, regular rhythm, no murmurs, brisk cap refill  Chest:  no chest wall tenderness  Chest/Pulmonary:  Mild diffuse wheezing. No crackles. Normal phonation. no stridor, normal phonation. No stridor. 95% on room air. normal work of breathing  Abdomen: soft, nondistended, nontender  Back/Spine:  no CVA or midline tenderness.  Musculoskeletal:  no pretibial edema, no calf tenderness. Gross ROM intact to joints of extremities with no obvious deformities.  Skin:  warm, dry, no rash  Neuro:  awake, alert, answers questions appropriately, follows commands, moves all limbs  Psych:  calm, normal affect      --------------- RESULTS ---------------  EKG:    Reviewed and interpreted by me.  sinus bradycardia at 55bpm, no ST or T wave changes, normal intervals  No notable change from prior on 4/1/2019  My read.    LAB:  Reviewed and interpreted by me.  Results for orders placed or performed during the hospital encounter of 09/07/21   XR Chest Port 1 View    Impression    IMPRESSION: Shallow inspiration. Minimal right basilar fibrosis. No signs of pneumonia or failure. Heart and pulmonary vascularity are normal.   CT Chest Pulmonary Embolism w Contrast    Impression    IMPRESSION:    1.  No acute pulmonary embolism.  2.   Commonly reported imaging features of (COVID-19) pneumonia are present. Organizing pneumonia, as can be seen in the setting of drug toxicity and connective tissue disease, can cause a similar imaging pattern.  3.  Retained airway material in the lower lobes and right middle lobe greatest left lower lobe.  4.  Moderate hiatal hernia.     Blood gas venous   Result Value Ref Range    pH Venous 7.43 7.35 - 7.45    pCO2 Venous 44 35 - 50 mm Hg    pO2 Venous 19 (L) 25 - 47 mm Hg    Bicarbonate Venous 27 24 - 30 mmol/L    Base Excess/Deficit (+/-) 4.8   mmol/L    Oxyhemoglobin Venous 27.0 (L) 70.0 - 75.0 %    O2 Sat, Venous 27.6 (L) 70.0 - 75.0 %   CRP inflammation   Result Value Ref Range    CRP 18.9 (H) 0.0-<0.8 mg/dL   Result Value Ref Range    Procalcitonin 0.14 0.00 - 0.49 ng/mL   Result Value Ref Range    Magnesium 2.1 1.8 - 2.6 mg/dL   Result Value Ref Range    Troponin I <0.01 0.00 - 0.29 ng/mL   Basic metabolic panel   Result Value Ref Range    Sodium 141 136 - 145 mmol/L    Potassium 2.9 (L) 3.5 - 5.0 mmol/L    Chloride 105 98 - 107 mmol/L    Carbon Dioxide (CO2) 24 22 - 31 mmol/L    Anion Gap 12 5 - 18 mmol/L    Urea Nitrogen 14 8 - 28 mg/dL    Creatinine 0.86 0.60 - 1.10 mg/dL    Calcium 8.3 (L) 8.5 - 10.5 mg/dL    Glucose 122 70 - 125 mg/dL    GFR Estimate 60 (L) >60 mL/min/1.73m2   Hepatic function panel   Result Value Ref Range    Bilirubin Total 1.1 (H) 0.0 - 1.0 mg/dL    Bilirubin Direct 0.3 <=0.5 mg/dL    Protein Total 6.7 6.0 - 8.0 g/dL    Albumin 2.5 (L) 3.5 - 5.0 g/dL    Alkaline Phosphatase 95 45 - 120 U/L    AST 22 0 - 40 U/L    ALT 22 0 - 45 U/L   D dimer quantitative   Result Value Ref Range    D-Dimer Quantitative 1.46 (H) 0.00 - 0.50 ug/mL FEU   B-Type Natriuretic Peptide ([facility] Only)   Result Value Ref Range     0 - 167 pg/mL   CBC with platelets and differential   Result Value Ref Range    WBC Count 16.2 (H) 4.0 - 11.0 10e3/uL    RBC Count 3.38 (L) 3.80 - 5.20 10e6/uL    Hemoglobin  10.4 (L) 11.7 - 15.7 g/dL    Hematocrit 32.1 (L) 35.0 - 47.0 %    MCV 95 78 - 100 fL    MCH 30.8 26.5 - 33.0 pg    MCHC 32.4 31.5 - 36.5 g/dL    RDW 13.6 10.0 - 15.0 %    Platelet Count 425 150 - 450 10e3/uL    % Neutrophils 73 %    % Lymphocytes 21 %    % Monocytes 5 %    % Eosinophils 0 %    % Basophils 0 %    % Immature Granulocytes 1 %    NRBCs per 100 WBC 0 <1 /100    Absolute Neutrophils 11.8 (H) 1.6 - 8.3 10e3/uL    Absolute Lymphocytes 3.4 0.8 - 5.3 10e3/uL    Absolute Monocytes 0.8 0.0 - 1.3 10e3/uL    Absolute Eosinophils 0.1 0.0 - 0.7 10e3/uL    Absolute Basophils 0.0 0.0 - 0.2 10e3/uL    Absolute Immature Granulocytes 0.2 (H) <=0.0 10e3/uL    Absolute NRBCs 0.0 10e3/uL       RADIOLOGY:  Reviewed by me. Please see official radiology report.  Recent Results (from the past 24 hour(s))   XR Chest Port 1 View    Narrative    EXAM: XR CHEST PORT 1 VIEW  LOCATION: [facility]  DATE/TIME: 9/7/2021 9:12 AM    INDICATION: cough, SOB  COMPARISON: 02/18/2019      Impression    IMPRESSION: Shallow inspiration. Minimal right basilar fibrosis. No signs of pneumonia or failure. Heart and pulmonary vascularity are normal.   CT Chest Pulmonary Embolism w Contrast    Narrative    EXAM: CT CHEST PULMONARY EMBOLISM W CONTRAST  LOCATION: [facility]  DATE/TIME: 9/7/2021 10:14 AM    INDICATION: PE suspected, low/intermediate prob, positive D-dimer  COMPARISON: No prior cross-sectional imaging of the chest.  TECHNIQUE: CT chest pulmonary angiogram during arterial phase injection of IV contrast. Multiplanar reformats and MIP reconstructions were performed. Dose reduction techniques were used.   CONTRAST: 100ml Isovue 370     FINDINGS:  ANGIOGRAM CHEST: The main pulmonary artery is normal in size. No pulmonary artery filling defects. No pulmonary artery calcifications or webs.    Normal caliber aortic root with preserved sinotubular junction. No thoracic aortic aneurysm. 2 vessel  arch anatomy. No findings to suggest acute aortic syndrome.    LUNGS AND PLEURA: Patchy, multifocal airspace opacities are present which are predominantly groundglass attenuation with smaller consolidative components. Lung opacities are peripheral and basilar predominant. Trachea and central airways are patent.   Retained mucus/inspissated material is present within the posterior and lateral basal segments of the left lower lobe but no related postobstructive atelectasis. Mild airway wall thickening in the segmental and subsegmental airways in the right lower and   middle lobes.    Small right Bochdalek hernia containing fat. Trace pleural fluid in the posterior costophrenic sulci.    MEDIASTINUM: Hilar and mediastinal lymph nodes are normal in size. Moderate hiatal hernia with foreshortening of the esophagus. Cardiac chambers are not enlarged. No pericardial effusion.    CORONARY ARTERY CALCIFICATION: Mild.    UPPER ABDOMEN: No actionable findings in the imaged upper abdomen.    MUSCULOSKELETAL: Thoracic spondylosis characterized by marginal osteophytes and disc space narrowing resulting in accentuated thoracic kyphosis. No aggressive or destructive bone lesions.      Impression    IMPRESSION:    1.  No acute pulmonary embolism.  2.  Commonly reported imaging features of (COVID-19) pneumonia are present. Organizing pneumonia, as can be seen in the setting of drug toxicity and connective tissue disease, can cause a similar imaging pattern.  3.  Retained airway material in the lower lobes and right middle lobe greatest left lower lobe.  4.  Moderate hiatal hernia.           I, Alireza Sosa, am serving as a scribe to document services personally performed by Dr. Dashawn Schumacher based on my observation and the provider's statements to me. I, Dashawn Schumacher MD attest that Alireza Sosa is acting in a scribe capacity, has observed my performance of the services and has documented them in accordance  with my direction.      Dashawn Schumacher MD  09/07/21  Emergency Medicine  [facility] EMERGENCY ROOM  [address] [id number] [phone]  Dept: [phone]     Dashawn Schumacher MD  09/07/21 [id number]

## 2021-09-07 NOTE — CONSULTS
Care Management Initial Consult    General Information  Assessment completed with: Genesis Ayon  Type of CM/SW Visit: Initial Assessment    Primary Care Provider verified and updated as needed: Yes   Readmission within the last 30 days: no previous admission in last 30 days      Reason for Consult: discharge planning  Advance Care Planning: Advance Care Planning Reviewed: verified with patient          Communication Assessment  Patient's communication style: spoken language (English or Bilingual)    Hearing Difficulty or Deaf: no   Wear Glasses or Blind: yes    Cognitive  Cognitive/Neuro/Behavioral: WDL                      Living Environment:   People in home: child(angel), adult     Current living Arrangements: house      Able to return to prior arrangements: yes       Family/Social Support:  Care provided by: self  Provides care for: no one  Marital Status: Single  Children          Description of Support System: Supportive, Involved    Support Assessment: Adequate social supports    Current Resources:   Patient receiving home care services: No     Community Resources: None  Equipment currently used at home: walker, rolling  Supplies currently used at home: None    Employment/Financial:  Employment Status: retired        Financial Concerns: No concerns identified   Referral to Financial Counselor: No       Lifestyle & Psychosocial Needs:  Social Determinants of Health     Tobacco Use: Low Risk      Smoking Tobacco Use: Never Smoker     Smokeless Tobacco Use: Never Used   Alcohol Use:      Frequency of Alcohol Consumption:      Average Number of Drinks:      Frequency of Binge Drinking:    Financial Resource Strain:      Difficulty of Paying Living Expenses:    Food Insecurity:      Worried About Running Out of Food in the Last Year:      Ran Out of Food in the Last Year:    Transportation Needs:      Lack of Transportation (Medical):      Lack of Transportation (Non-Medical):    Physical Activity:      Days of  Exercise per Week:      Minutes of Exercise per Session:    Stress:      Feeling of Stress :    Social Connections:      Frequency of Communication with Friends and Family:      Frequency of Social Gatherings with Friends and Family:      Attends Bahai Services:      Active Member of Clubs or Organizations:      Attends Club or Organization Meetings:      Marital Status:    Intimate Partner Violence:      Fear of Current or Ex-Partner:      Emotionally Abused:      Physically Abused:      Sexually Abused:    Depression:      PHQ-2 Score:    Housing Stability:      Unable to Pay for Housing in the Last Year:      Number of Places Lived in the Last Year:      Unstable Housing in the Last Year:        Functional Status:  Prior to admission patient needed assistance:   Dependent ADLs:: Independent  Dependent IADLs:: Independent  Assesssment of Functional Status: At functional baseline    Mental Health Status:  Mental Health Status: No Current Concerns       Chemical Dependency Status:  Chemical Dependency Status: No Current Concerns             Values/Beliefs:  Spiritual, Cultural Beliefs, Bahai Practices, Values that affect care: no               Additional Information:  JONATHAN assessed. Pt resides at home with her son, she is independent with ADLs/IADLs, drives short distances and uses a 4WW with ambulation. Pt has hx of frequent falls, family is working to reduce falls risk. Discharge goal to return home with family transport. Service needs pending medical progression.    Kimberlyn Hull, KARINSW

## 2021-09-07 NOTE — PHARMACY-ADMISSION MEDICATION HISTORY
Pharmacy Note - Admission Medication History    Pertinent Provider Information: Patient has inhalers in her purse. Has not taken her vitamins in a few days due to upset stomach    Patient took some Mucinex over the weekend to try to help current illness, but stated does not usually take. This medication was left off the medication list below for this reason.    ______________________________________________________________________    Prior To Admission (PTA) med list completed and updated in EMR.       PTA Med List   Medication Sig Last Dose     albuterol (PROAIR HFA) 90 mcg/actuation inhaler [ALBUTEROL (PROAIR HFA) 90 MCG/ACTUATION INHALER] Inhale 2 puffs every 4 (four) hours as needed. 2 puffs every 4-6 hours as needed        9/7/2021- has inhaler in purse at AM     ascorbic acid (VITAMIN C) 500 MG CPCR CR capsule Take 500 mg by mouth every morning 9/6/2021 at AM     aspirin 81 MG EC tablet [ASPIRIN 81 MG EC TABLET] Take 81 mg by mouth daily. 9/6/2021 at AM     atorvastatin (LIPITOR) 20 MG tablet [ATORVASTATIN (LIPITOR) 20 MG TABLET] Take 20 mg by mouth at bedtime.        9/6/2021 at PM     CALCIUM CARBONATE/VITAMIN D3 (CALCIUM 600 + D,3, ORAL) Take 1 tablet by mouth every morning  Past Week at Unknown time     cholecalciferol, vitamin D3, 1,000 unit tablet Take 1,000 Units by mouth every morning  Past Week at AM     fluticasone (ARNUITY ELLIPTA) 100 MCG/ACT inhaler Inhale 1 puff into the lungs every morning 9/7/2021- has inhaler in purse at AM     furosemide (LASIX) 20 MG tablet Take 20 mg by mouth every morning 9/6/2021 at AM     GLUC DELGADO/CHONDRO DELGADO A/VIT C/MN (GLUCOSAMINE 1500 COMPLEX ORAL) Take 1 tablet by mouth every morning  Past Week at AM     ibuprofen (ADVIL/MOTRIN) 200 MG tablet Take 200-400 mg by mouth every 4 hours as needed for mild pain Past Month at Unknown time     ipratropium-albuterol (DUO-NEB) 0.5-2.5 mg/3 mL nebulizer [IPRATROPIUM-ALBUTEROL (DUO-NEB) 0.5-2.5 MG/3 ML NEBULIZER] Inhale 3 mL every  6 (six) hours as needed. Past Week at Unknown time     montelukast (SINGULAIR) 10 mg tablet [MONTELUKAST (SINGULAIR) 10 MG TABLET] Take 10 mg by mouth bedtime.  9/6/2021 at PM     omega-3 fatty acids-fish oil (FISH OIL) 360-1,200 mg cap Take 1 capsule by mouth every morning  9/6/2021 at AM     UNABLE TO FIND Take 2 tablets by mouth every morning MEDICATION NAME: Focus eye vitamins Past Week at AM       Information source(s): Patient, Family member, [facility]cription bottles and [facility]  Method of interview communication: phone    Summary of Changes to PTA Med List  New: furosemide, vitamin C, Arnuity ellipta, Focus eye vitamins, ibuprofen  Discontinued: prednisone (2019), hydrochlorothiazide (2017), Flovent  Changed: all other medications to reflect time of day    Patient was asked about OTC/herbal products specifically.  PTA med list reflects this.    In the past week, patient estimated taking medication this percent of the time:  greater than 90%.    Allergies were reviewed, assessed, and updated with the patient.      Medications available for use during hospital stay: Arnuity and albuterol inhalers.     The information provided in this note is only as accurate as the sources available at the time of the update(s).    Thank you for the opportunity to participate in the care of this patient.    Cheri Nowak  9/7/2021 10:59 AM

## 2021-09-07 NOTE — ED TRIAGE NOTES
The patient presents to the ED with daughter with c/o worsening shortness of breath since last night. Hx of asthma. Does live with her son who just recovered from COVID19.

## 2021-09-07 NOTE — PROGRESS NOTES
Pt. Received two duonebs. Breath sounds are course. NO change with nebs. On room air. Strong loose congested cough. No new changes.     Verna RRT

## 2021-09-07 NOTE — H&P
[facility] MEDICINE ADMISSION HISTORY AND PHYSICAL     Assessment & Plan       Tia Brooke is a 89 year old old female with history of COPD, HTN, HLD, Gilbert, paroxysmal A. fib, not on anticoagulation presented with cough and shortness of breath    Acute respiratory failure with hypoxia   COVID-19 pneumonia/possible bacterial pneumonia  COPD exacerbation    History of COPD,SOB, productive cough, loss of taste and smell.  Positive COVID-19 exposure.  CBC revealed leukocytosis of 16.2.  Normal pro Darian which is exclude bacterial etiology.  CRP of 18.9.  VBG with normal pH but decreased PaO2, 19.  CT chest revealed no acute PE, positive for PE pneumonia and hiatal hernia  -Oxygen as needed  -Compivent, 4X D  -Albuterol every 2 as needed  -Azithromycin  -Ceftriaxone  -Mucomyst  -Remdesivir  -Dexamethasone  -PTA montelukast        Hypokalemia.   Patient on Lasix 20 mg.  -Replace with potassium replacement protocol    Normocytic anemia  -Monitoring  Hiatal hernia  Chronic conditions  HTN  HLD-PTA atorvastatin  Paroxysmal A. Fib            DVTP: Lovenox prophylactic dose   Code Status: No CPR- Pre-arrest intubation OK  Disposition: Inpatient   Expected LOS: 2 days   Goals for the hospitalization: Stabilization    Chief Complaint  cough and shortness of breath     HISTORY     Tia Brooke is a 89 year old old female with h/o COPD, HTN, HLD, GERD bruits, paroxysmal A. fib, not on anticoagulation p/w shortness of breath and cough.  Her symptoms started yesterday, prevented her from sleeping overnight.  The cough is productive with white, nonbloody sputum.  This morning patient reported increase on her fatigue.  Also patient endorses loss of smell and loss of taste.Patient denies fever, sweats, chills, runny nose, and nasal congestion.  Patient tried to use her inhaler without relief this morning  Patient's son who lives with her and COVID-19, weeks ago, tested negative couple days  "ago, no ongoing symptoms    Past Medical History     No past medical history on file.     Surgical History     Past Surgical History:   Procedure Laterality Date     COLECTOMY PARTIAL  2004    Sigmoid colectomy for adenocarcinoma     PHACOEMULSIFICATION CLEAR CORNEA WITH STANDARD INTRAOCULAR LENS IMPLANT Bilateral      TOTAL HIP ARTHROPLASTY Right 2009    Dr. Gurpreet Ho     Family History    Reviewed, and   Family History   Problem Relation Age of Onset     Leukemia Mother      Heart Failure Mother          age 52 of \"enlarged heart\"     Chronic Obstructive Pulmonary Disease Father      Heart Disease Brother          age 62, unspecified heart problems     Hypertension Daughter      Hyperlipidemia Daughter      Atrial fibrillation Daughter       Social History      Social History     Tobacco Use     Smoking status: Never Smoker     Smokeless tobacco: Never Used   Substance Use Topics     Alcohol use: Yes     Comment: Alcoholic Drinks/day: 0-1 per month     Drug use: No        Allergies   No Known Allergies  Prior to Admission Medications      Prior to Admission Medications   Prescriptions Last Dose Informant Patient Reported? Taking?   CALCIUM CARBONATE/VITAMIN D3 (CALCIUM 600 + D,3, ORAL) Past Week at Unknown time  Yes Yes   Sig: Take 1 tablet by mouth every morning    GLUC DELGADO/CHONDRO DELGADO A/VIT C/MN (GLUCOSAMINE 1500 COMPLEX ORAL) Past Week at AM  Yes Yes   Sig: Take 1 tablet by mouth every morning    UNABLE TO FIND Past Week at AM  Yes Yes   Sig: Take 2 tablets by mouth every morning MEDICATION NAME: Focus eye vitamins   albuterol (PROAIR HFA) 90 mcg/actuation inhaler 2021- has inhaler in purse at AM  Yes Yes   Sig: [ALBUTEROL (PROAIR HFA) 90 MCG/ACTUATION INHALER] Inhale 2 puffs every 4 (four) hours as needed. 2 puffs every 4-6 hours as needed          ascorbic acid (VITAMIN C) 500 MG CPCR CR capsule 2021 at AM  Yes Yes   Sig: Take 500 mg by mouth every morning   aspirin 81 MG EC tablet " 9/6/2021 at AM  Yes Yes   Sig: [ASPIRIN 81 MG EC TABLET] Take 81 mg by mouth daily.   atorvastatin (LIPITOR) 20 MG tablet 9/6/2021 at PM  Yes Yes   Sig: [ATORVASTATIN (LIPITOR) 20 MG TABLET] Take 20 mg by mouth at bedtime.          cholecalciferol, vitamin D3, 1,000 unit tablet Past Week at AM  Yes Yes   Sig: Take 1,000 Units by mouth every morning    fluticasone (ARNUITY ELLIPTA) 100 MCG/ACT inhaler 9/7/2021- has inhaler in purse at AM  Yes Yes   Sig: Inhale 1 puff into the lungs every morning   furosemide (LASIX) 20 MG tablet 9/6/2021 at AM  Yes Yes   Sig: Take 20 mg by mouth every morning   ibuprofen (ADVIL/MOTRIN) 200 MG tablet Past Month at Unknown time  Yes Yes   Sig: Take 200-400 mg by mouth every 4 hours as needed for mild pain   ipratropium-albuterol (DUO-NEB) 0.5-2.5 mg/3 mL nebulizer Past Week at Unknown time  No Yes   Sig: [IPRATROPIUM-ALBUTEROL (DUO-NEB) 0.5-2.5 MG/3 ML NEBULIZER] Inhale 3 mL every 6 (six) hours as needed.   montelukast (SINGULAIR) 10 mg tablet 9/6/2021 at PM  Yes Yes   Sig: [MONTELUKAST (SINGULAIR) 10 MG TABLET] Take 10 mg by mouth bedtime.    omega-3 fatty acids-fish oil (FISH OIL) 360-1,200 mg cap 9/6/2021 at AM  Yes Yes   Sig: Take 1 capsule by mouth every morning       Facility-Administered Medications: None      Review of Systems     A 12 point comprehensive review of systems was negative except as noted above in HPI.    PHYSICAL EXAMINATION       Vitals      Temp:  [98  F (36.7  C)-98.1  F (36.7  C)] 98.1  F (36.7  C)  Pulse:  [54-77] 65  Resp:  [12-36] 24  BP: (131-176)/(55-72) 166/70  SpO2:  [90 %-100 %] 98 %    Examination     GENERAL:  Alert, appears comfortable, in no acute distress, appears stated age   HEAD:  Normocephalic, without obvious abnormality, atraumatic   EYES:  PERRL, conjunctiva/corneas clear, no scleral icterus, EOM's intact   NOSE: Nares normal, septum midline, mucosa normal, no drainage   THROAT: Lips, mucosa, and tongue normal; teeth and gums normal, mouth  moist   NECK: Supple, symmetrical, trachea midline   BACK:   Symmetric, no curvature, ROM normal   LUNGS:    Bilateral expiratory wheeze, symmetric chest rise on inhalation, respirations unlabored   CHEST WALL:  No tenderness or deformity   HEART:  Regular rate and rhythm, S1 and S2 normal, no murmur, rub, or gallop    ABDOMEN:   Soft, non-tender, bowel sounds active all four quadrants, no masses, no organomegaly, no rebound or guarding   EXTREMITIES: Extremities normal, atraumatic, no cyanosis, trace edema lateral   SKIN: Dry to touch, no exanthems in the visualized areas   NEURO: Alert, oriented x 4, moves all four extremities freely, non-focal   PSYCH: Cooperative, behavior is appropriate      Pertinent Lab     Most Recent 3 CBC's:  Recent Labs   Lab Test 09/07/21 0921 02/19/19  0546 02/18/19  1623   WBC 16.2* 16.9* 19.4*   HGB 10.4* 10.3* 11.6*   MCV 95 94 95    457* 491*     Most Recent 3 BMP's:  Recent Labs   Lab Test 09/07/21  1635 09/07/21 0921 02/19/19  0546 02/18/19  1623   NA  --  141 136 139   POTASSIUM  --  2.9* 4.3 3.4*   CHLORIDE  --  105 96* 94*   CO2  --  24 29 30   BUN  --  14 27 23   CR 0.80 0.86 0.95 1.07   ANIONGAP  --  12 11 15   JUAN FRANCISCO  --  8.3* 8.9 10.3   GLC  --  122 147* 110     Most Recent 2 LFT's:  Recent Labs   Lab Test 09/07/21 0921 02/18/19  1623   AST 22 20   ALT 22 28   ALKPHOS 95 98   BILITOTAL 1.1* 1.1*     Most Recent 3 INR's:  Recent Labs   Lab Test 09/07/21  1725   INR 1.10     Anticoagulation Dose History    There is no flowsheet data to display.         Most Recent 3 Creatinines:  Recent Labs   Lab Test 09/07/21  1635 09/07/21 0921 02/19/19  0546   CR 0.80 0.86 0.95     Most Recent 3 Hemoglobins:  Recent Labs   Lab Test 09/07/21 0921 02/19/19  0546 02/18/19  1623   HGB 10.4* 10.3* 11.6*     Most Recent 3 Troponin's:No lab results found.  Most Recent 3 BNP's:No lab results found.  Most Recent D-dimer:  Recent Labs   Lab Test 09/07/21  0921   DD 1.46*     Most Recent  Cholesterol Panel:  Recent Labs   Lab Test 05/26/16  1029   CHOL 200*      HDL 50   TRIG 155*     Most Recent 6 Bacteria Isolates From Any Culture (See EPIC Reports for Culture Details):No lab results found.  Most Recent TSH and T4:  Recent Labs   Lab Test 02/19/19  0546   TSH 0.45     Most Recent Hemoglobin A1c:No lab results found.  Most Recent 6 glucoses:  Recent Labs   Lab Test 09/07/21  0921 02/19/19  0546 02/18/19  1623    147* 110     Most Recent Urinalysis:  Recent Labs   Lab Test 02/18/19 2002   COLOR Yellow   APPEARANCE Clear   URINEGLC Negative   URINEBILI Negative   URINEKETONE 25 mg/dL*   SG 1.005   UBLD Negative   URINEPH 9.0*   PROTEIN Negative   UROBILINOGEN <2.0 E.U./dL   NITRITE Negative   LEUKEST Negative     Most Recent ABG:No lab results found.  Most Recent ESR & CRP:  Recent Labs   Lab Test 09/07/21 0921   CRP 18.9*     Most Recent Anemia Panel:  Recent Labs   Lab Test 09/07/21 0921   WBC 16.2*   HGB 10.4*   HCT 32.1*   MCV 95        Most Recent CPK:No lab results found.      Pertinent Radiology     Radiology Results:   Recent Results (from the past 24 hour(s))   XR Chest Port 1 View    Narrative    EXAM: XR CHEST PORT 1 VIEW  LOCATION: [facility]  DATE/TIME: 9/7/2021 9:12 AM    INDICATION: cough, SOB  COMPARISON: 02/18/2019      Impression    IMPRESSION: Shallow inspiration. Minimal right basilar fibrosis. No signs of pneumonia or failure. Heart and pulmonary vascularity are normal.   CT Chest Pulmonary Embolism w Contrast    Narrative    EXAM: CT CHEST PULMONARY EMBOLISM W CONTRAST  LOCATION: [facility]  DATE/TIME: 9/7/2021 10:14 AM    INDICATION: PE suspected, low/intermediate prob, positive D-dimer  COMPARISON: No prior cross-sectional imaging of the chest.  TECHNIQUE: CT chest pulmonary angiogram during arterial phase injection of IV contrast. Multiplanar reformats and MIP reconstructions were performed. Dose  reduction techniques were used.   CONTRAST: 100ml Isovue 370     FINDINGS:  ANGIOGRAM CHEST: The main pulmonary artery is normal in size. No pulmonary artery filling defects. No pulmonary artery calcifications or webs.    Normal caliber aortic root with preserved sinotubular junction. No thoracic aortic aneurysm. 2 vessel arch anatomy. No findings to suggest acute aortic syndrome.    LUNGS AND PLEURA: Patchy, multifocal airspace opacities are present which are predominantly groundglass attenuation with smaller consolidative components. Lung opacities are peripheral and basilar predominant. Trachea and central airways are patent.   Retained mucus/inspissated material is present within the posterior and lateral basal segments of the left lower lobe but no related postobstructive atelectasis. Mild airway wall thickening in the segmental and subsegmental airways in the right lower and   middle lobes.    Small right Bochdalek hernia containing fat. Trace pleural fluid in the posterior costophrenic sulci.    MEDIASTINUM: Hilar and mediastinal lymph nodes are normal in size. Moderate hiatal hernia with foreshortening of the esophagus. Cardiac chambers are not enlarged. No pericardial effusion.    CORONARY ARTERY CALCIFICATION: Mild.    UPPER ABDOMEN: No actionable findings in the imaged upper abdomen.    MUSCULOSKELETAL: Thoracic spondylosis characterized by marginal osteophytes and disc space narrowing resulting in accentuated thoracic kyphosis. No aggressive or destructive bone lesions.      Impression    IMPRESSION:    1.  No acute pulmonary embolism.  2.  Commonly reported imaging features of (COVID-19) pneumonia are present. Organizing pneumonia, as can be seen in the setting of drug toxicity and connective tissue disease, can cause a similar imaging pattern.  3.  Retained airway material in the lower lobes and right middle lobe greatest left lower lobe.  4.  Moderate hiatal hernia.       EKG Results: personally  reviewed.     Advance Care Planning      Patient was discussed with attending physician, Dr. Haley who agreed for the plan  Enedelia Espinoza MD   family medicine residency program

## 2021-09-08 LAB
ANION GAP SERPL CALCULATED.3IONS-SCNC: 8 MMOL/L (ref 5–18)
BACTERIA SPT CULT: NORMAL
BUN SERPL-MCNC: 15 MG/DL (ref 8–28)
C REACTIVE PROTEIN LHE: 18.9 MG/DL (ref 0–0.8)
CALCIUM SERPL-MCNC: 8.7 MG/DL (ref 8.5–10.5)
CHLORIDE BLD-SCNC: 105 MMOL/L (ref 98–107)
CO2 SERPL-SCNC: 26 MMOL/L (ref 22–31)
CREAT SERPL-MCNC: 0.94 MG/DL (ref 0.6–1.1)
D DIMER PPP FEU-MCNC: 1.3 UG/ML FEU (ref 0–0.5)
ERYTHROCYTE [DISTWIDTH] IN BLOOD BY AUTOMATED COUNT: 14 % (ref 10–15)
FIBRINOGEN PPP-MCNC: 846 MG/DL (ref 170–490)
GFR SERPL CREATININE-BSD FRML MDRD: 54 ML/MIN/1.73M2
GLUCOSE BLD-MCNC: 157 MG/DL (ref 70–125)
GRAM STAIN RESULT: NORMAL
HCT VFR BLD AUTO: 29.6 % (ref 35–47)
HGB BLD-MCNC: 9.5 G/DL (ref 11.7–15.7)
MCH RBC QN AUTO: 30.5 PG (ref 26.5–33)
MCHC RBC AUTO-ENTMCNC: 32.1 G/DL (ref 31.5–36.5)
MCV RBC AUTO: 95 FL (ref 78–100)
PLATELET # BLD AUTO: 460 10E3/UL (ref 150–450)
POTASSIUM BLD-SCNC: 3.7 MMOL/L (ref 3.5–5)
RBC # BLD AUTO: 3.11 10E6/UL (ref 3.8–5.2)
SODIUM SERPL-SCNC: 139 MMOL/L (ref 136–145)
WBC # BLD AUTO: 15.3 10E3/UL (ref 4–11)

## 2021-09-08 PROCEDURE — 258N000003 HC RX IP 258 OP 636: Performed by: INTERNAL MEDICINE

## 2021-09-08 PROCEDURE — 999N000157 HC STATISTIC RCP TIME EA 10 MIN

## 2021-09-08 PROCEDURE — 85384 FIBRINOGEN ACTIVITY: CPT | Performed by: INTERNAL MEDICINE

## 2021-09-08 PROCEDURE — 250N000013 HC RX MED GY IP 250 OP 250 PS 637: Performed by: FAMILY MEDICINE

## 2021-09-08 PROCEDURE — 250N000009 HC RX 250: Performed by: INTERNAL MEDICINE

## 2021-09-08 PROCEDURE — 250N000011 HC RX IP 250 OP 636: Performed by: STUDENT IN AN ORGANIZED HEALTH CARE EDUCATION/TRAINING PROGRAM

## 2021-09-08 PROCEDURE — 250N000011 HC RX IP 250 OP 636: Performed by: INTERNAL MEDICINE

## 2021-09-08 PROCEDURE — 94640 AIRWAY INHALATION TREATMENT: CPT

## 2021-09-08 PROCEDURE — 99207 PR CDG-MDM COMPONENT: MEETS MODERATE - DOWN CODED: CPT | Performed by: INTERNAL MEDICINE

## 2021-09-08 PROCEDURE — 80048 BASIC METABOLIC PNL TOTAL CA: CPT | Performed by: INTERNAL MEDICINE

## 2021-09-08 PROCEDURE — 120N000001 HC R&B MED SURG/OB

## 2021-09-08 PROCEDURE — 250N000013 HC RX MED GY IP 250 OP 250 PS 637: Performed by: STUDENT IN AN ORGANIZED HEALTH CARE EDUCATION/TRAINING PROGRAM

## 2021-09-08 PROCEDURE — 250N000013 HC RX MED GY IP 250 OP 250 PS 637: Performed by: INTERNAL MEDICINE

## 2021-09-08 PROCEDURE — 94640 AIRWAY INHALATION TREATMENT: CPT | Mod: 76

## 2021-09-08 PROCEDURE — 85027 COMPLETE CBC AUTOMATED: CPT | Performed by: INTERNAL MEDICINE

## 2021-09-08 PROCEDURE — 87070 CULTURE OTHR SPECIMN AEROBIC: CPT | Performed by: FAMILY MEDICINE

## 2021-09-08 PROCEDURE — 86141 C-REACTIVE PROTEIN HS: CPT | Performed by: INTERNAL MEDICINE

## 2021-09-08 PROCEDURE — 85379 FIBRIN DEGRADATION QUANT: CPT | Performed by: INTERNAL MEDICINE

## 2021-09-08 PROCEDURE — 258N000003 HC RX IP 258 OP 636: Performed by: STUDENT IN AN ORGANIZED HEALTH CARE EDUCATION/TRAINING PROGRAM

## 2021-09-08 PROCEDURE — 99232 SBSQ HOSP IP/OBS MODERATE 35: CPT | Mod: GC | Performed by: INTERNAL MEDICINE

## 2021-09-08 PROCEDURE — 36415 COLL VENOUS BLD VENIPUNCTURE: CPT | Performed by: INTERNAL MEDICINE

## 2021-09-08 RX ADMIN — ENOXAPARIN SODIUM 40 MG: 100 INJECTION SUBCUTANEOUS at 17:07

## 2021-09-08 RX ADMIN — CEFTRIAXONE 1 G: 1 INJECTION, POWDER, FOR SOLUTION INTRAMUSCULAR; INTRAVENOUS at 17:07

## 2021-09-08 RX ADMIN — REMDESIVIR 100 MG: 100 INJECTION, POWDER, LYOPHILIZED, FOR SOLUTION INTRAVENOUS at 18:23

## 2021-09-08 RX ADMIN — SODIUM CHLORIDE, PRESERVATIVE FREE 50 ML: 5 INJECTION INTRAVENOUS at 18:24

## 2021-09-08 RX ADMIN — Medication 1 TABLET: at 09:02

## 2021-09-08 RX ADMIN — IPRATROPIUM BROMIDE AND ALBUTEROL SULFATE 3 ML: .5; 3 SOLUTION RESPIRATORY (INHALATION) at 15:24

## 2021-09-08 RX ADMIN — MONTELUKAST 10 MG: 10 TABLET ORAL at 21:44

## 2021-09-08 RX ADMIN — IPRATROPIUM BROMIDE AND ALBUTEROL SULFATE 3 ML: .5; 3 SOLUTION RESPIRATORY (INHALATION) at 20:03

## 2021-09-08 RX ADMIN — IPRATROPIUM BROMIDE AND ALBUTEROL SULFATE 3 ML: .5; 3 SOLUTION RESPIRATORY (INHALATION) at 08:14

## 2021-09-08 RX ADMIN — IPRATROPIUM BROMIDE AND ALBUTEROL SULFATE 3 ML: .5; 3 SOLUTION RESPIRATORY (INHALATION) at 11:45

## 2021-09-08 RX ADMIN — ATORVASTATIN CALCIUM 20 MG: 10 TABLET, FILM COATED ORAL at 21:44

## 2021-09-08 RX ADMIN — AZITHROMYCIN MONOHYDRATE 500 MG: 500 INJECTION, POWDER, LYOPHILIZED, FOR SOLUTION INTRAVENOUS at 17:12

## 2021-09-08 RX ADMIN — FUROSEMIDE 20 MG: 20 TABLET ORAL at 09:02

## 2021-09-08 RX ADMIN — PANTOPRAZOLE SODIUM 40 MG: 20 TABLET, DELAYED RELEASE ORAL at 07:07

## 2021-09-08 RX ADMIN — ACETAMINOPHEN 650 MG: 325 TABLET ORAL at 21:44

## 2021-09-08 RX ADMIN — FLUTICASONE FUROATE 1 PUFF: 100 POWDER RESPIRATORY (INHALATION) at 09:12

## 2021-09-08 RX ADMIN — DEXAMETHASONE SODIUM PHOSPHATE 6 MG: 10 INJECTION, SOLUTION INTRAMUSCULAR; INTRAVENOUS at 09:02

## 2021-09-08 RX ADMIN — POLYETHYLENE GLYCOL 3350 17 G: 17 POWDER, FOR SOLUTION ORAL at 09:02

## 2021-09-08 RX ADMIN — Medication 1 MG: at 21:44

## 2021-09-08 RX ADMIN — ASPIRIN 81 MG: 81 TABLET, DELAYED RELEASE ORAL at 09:02

## 2021-09-08 RX ADMIN — CHOLECALCIFEROL TAB 25 MCG (1000 UNIT) 1000 UNITS: 25 TAB at 09:02

## 2021-09-08 ASSESSMENT — MIFFLIN-ST. JEOR: SCORE: 1083.14

## 2021-09-08 NOTE — PLAN OF CARE
Problem: Adult Inpatient Plan of Care  Goal: Plan of Care Review  Outcome: Improving  Problem: Gas Exchange Impaired  Goal: Optimal Gas Exchange  Outcome: No Change  New admit to unit, oriented to room and bed controls, pt A/O x4, denied pain, nausea or vomiting, LS crackles and coarse, pt requires 2L oxygen nasal cannula, oximetry monitor, O2 sat >90%, encouraged IS used, cough and deep breathing, telemetry - NSR, pt pleasant and cooperative to cares, updated daughter Genesis, continue to monitor pt status.

## 2021-09-08 NOTE — PLAN OF CARE
Problem: Adult Inpatient Plan of Care  Goal: Readiness for Transition of Care  Outcome: Improving     Problem: Risk for Delirium  Goal: Improved Sleep  Outcome: Improving     Problem: Gas Exchange Impaired  Goal: Optimal Gas Exchange  Outcome: Improving     Patient alert and oriented x 4, pleasant. Denies pain or nausea this shift. O2 saturation stable on 2L via nasal cannula. Continuous pulse oximetry in place. Coarse crackles noted, encouraged coughing and deep breathing. Intermittent, productive cough noted. Sputum culture pending. Incentive spirometer use encouraged while awake. On K protocol with recheck scheduled for 0600. VSS. Peripheral IV x 2 to left arm, both saline locked. New sputum specimen needed due to contamination. Calls appropriately for assistance, bed alarm in use overnight for patient safety.

## 2021-09-08 NOTE — PROGRESS NOTES
[facility] MEDICINE PROGRESS NOTE      Identification/Summary: Tia Brooke is a 89 year old female with a past medical history of COPD, hypertension, HLD, Carlos, paroxysmal A. fib not on anticoagulation who was admitted on 9/7/2021 for cough and shortness of breath.  Patient received [employer] Covid vaccine, and known timing.Patient tested positive for COVID-19 and VBG showed hypoxia.  Patient needed 2 L of oxygen supplementation.  CT chest revealed no PE but positive for pneumonia and hiatal hernia.  Patient is started on remdesivir and dexamethasone, azithromycin and ceftriaxone.  Patient's conditions stable and improving.     Assessment and Plan:  Acute respiratory failure with hypoxia   COVID-19 pneumonia/possible bacterial pneumonia  COPD exacerbation     History of COPD,SOB, productive cough, loss of taste and smell.  Positive COVID-19 exposure.  CBC revealed leukocytosis of 16.2.  Normal pro Darian which is exclude bacterial etiology.  CRP of 18.9.  VBG with normal pH but decreased PaO2, 19.  CT chest revealed no acute PE, positive for pneumonia and hiatal hernia.  Patient received [employer] Sherif Covid vaccine, unknown time  -Oxygen as needed  -Compivent, 4X D  -Albuterol every 2 as needed  -Azithromycin  -Ceftriaxone  -Mucomyst  -Remdesivir  -Dexamethasone  -PTA montelukast           Hypokalemia.   Patient on Lasix 20 mg.  -Replace with potassium replacement protocol     Normocytic anemia  -Monitoring  Hiatal hernia  Chronic conditions  HTN  HLD-PTA atorvastatin  Paroxysmal A. Fib    Diet: Combination Diet Regular Diet Adult  DVT Prophylaxis:  Enoxaparin (Lovenox) SQ  Code Status: No CPR- Pre-arrest intubation OK    Anticipated possible discharge in 1 day  Interval History/Subjective:  Overnight the patient was alert and oriented x4, pleasant.  Denies pain or nausea.  Patient on 2 L via NC.  Coarse crackles noted.  Complained of intermittent productive  cough.  This morning patient sitting comfortable in a recliner, consuming breakfast.  Patient endorses coughing with some sputum, he stated overall is better than yesterday.  Denies shortness of breath, other and other complaint  Physical Exam/Objective:  Temp:  [98  F (36.7  C)-98.8  F (37.1  C)] 98  F (36.7  C)  Pulse:  [54-81] 54  Resp:  [12-36] 18  BP: (124-166)/(55-70) 140/63  SpO2:  [90 %-100 %] 97 %    Body mass index is 31.72 kg/m .    GENERAL:  Alert, appears comfortable, in no acute distress, appears stated age   HEAD:  Normocephalic, without obvious abnormality, atraumatic   EYES:  PERRL, conjunctiva/corneas clear, no scleral icterus, EOM's intact   NOSE: Nares normal, septum midline, mucosa normal, no drainage   THROAT: Lips, mucosa, and tongue normal; teeth and gums normal, mouth moist   NECK: Supple, symmetrical, trachea midline   BACK:   Symmetric, no curvature, ROM normal   LUNGS:    Bilateral vibratory wheeze symmetric chest rise on inhalation, respirations unlabored   CHEST WALL:  No tenderness or deformity   HEART:  Regular rate and rhythm, S1 and S2 normal, no murmur, rub, or gallop    ABDOMEN:   Soft, non-tender, bowel sounds active all four quadrants, no masses, no organomegaly, no rebound or guarding   EXTREMITIES: Extremities normal, atraumatic, no cyanosis or edema    SKIN: Dry to touch, no exanthems in the visualized areas   NEURO: Alert, oriented x3, moves all four extremities freely   PSYCH: Cooperative, behavior is appropriate      Medications:   Personally Reviewed.  Medications     - MEDICATION INSTRUCTIONS -         remdesivir  100 mg Intravenous Q24H    And     sodium chloride 0.9%  50 mL Intravenous Q24H     aspirin  81 mg Oral Daily     atorvastatin  20 mg Oral At Bedtime     azithromycin  500 mg Intravenous Q24H     calcium carbonate-vitamin D  1 tablet Oral Daily     cefTRIAXone  1 g Intravenous Q24H     dexamethasone  6 mg Intravenous Daily     enoxaparin ANTICOAGULANT  40 mg  Subcutaneous Q24H     fluticasone  1 puff Inhalation QAM     furosemide  20 mg Oral QAM     ipratropium - albuterol 0.5 mg/2.5 mg/3 mL  3 mL Nebulization 4x daily     montelukast  10 mg Oral At Bedtime     pantoprazole  40 mg Oral QAM AC     sodium chloride (PF)  3 mL Intracatheter Q8H     sodium chloride (PF)  3 mL Intracatheter Q8H     Vitamin D3  1,000 Units Oral QAM       Data reviewed today: I personally reviewed all new medications, labs, imaging/diagnostics reports over the past 24 hours. Pertinent findings include:    Imaging:   Recent Results (from the past 24 hour(s))   XR Chest Port 1 View    Narrative    EXAM: XR CHEST PORT 1 VIEW  LOCATION: [facility]  DATE/TIME: 9/7/2021 9:12 AM    INDICATION: cough, SOB  COMPARISON: 02/18/2019      Impression    IMPRESSION: Shallow inspiration. Minimal right basilar fibrosis. No signs of pneumonia or failure. Heart and pulmonary vascularity are normal.   CT Chest Pulmonary Embolism w Contrast    Narrative    EXAM: CT CHEST PULMONARY EMBOLISM W CONTRAST  LOCATION: [facility]  DATE/TIME: 9/7/2021 10:14 AM    INDICATION: PE suspected, low/intermediate prob, positive D-dimer  COMPARISON: No prior cross-sectional imaging of the chest.  TECHNIQUE: CT chest pulmonary angiogram during arterial phase injection of IV contrast. Multiplanar reformats and MIP reconstructions were performed. Dose reduction techniques were used.   CONTRAST: 100ml Isovue 370     FINDINGS:  ANGIOGRAM CHEST: The main pulmonary artery is normal in size. No pulmonary artery filling defects. No pulmonary artery calcifications or webs.    Normal caliber aortic root with preserved sinotubular junction. No thoracic aortic aneurysm. 2 vessel arch anatomy. No findings to suggest acute aortic syndrome.    LUNGS AND PLEURA: Patchy, multifocal airspace opacities are present which are predominantly groundglass attenuation with smaller consolidative components.  Lung opacities are peripheral and basilar predominant. Trachea and central airways are patent.   Retained mucus/inspissated material is present within the posterior and lateral basal segments of the left lower lobe but no related postobstructive atelectasis. Mild airway wall thickening in the segmental and subsegmental airways in the right lower and   middle lobes.    Small right Bochdalek hernia containing fat. Trace pleural fluid in the posterior costophrenic sulci.    MEDIASTINUM: Hilar and mediastinal lymph nodes are normal in size. Moderate hiatal hernia with foreshortening of the esophagus. Cardiac chambers are not enlarged. No pericardial effusion.    CORONARY ARTERY CALCIFICATION: Mild.    UPPER ABDOMEN: No actionable findings in the imaged upper abdomen.    MUSCULOSKELETAL: Thoracic spondylosis characterized by marginal osteophytes and disc space narrowing resulting in accentuated thoracic kyphosis. No aggressive or destructive bone lesions.      Impression    IMPRESSION:    1.  No acute pulmonary embolism.  2.  Commonly reported imaging features of (COVID-19) pneumonia are present. Organizing pneumonia, as can be seen in the setting of drug toxicity and connective tissue disease, can cause a similar imaging pattern.  3.  Retained airway material in the lower lobes and right middle lobe greatest left lower lobe.  4.  Moderate hiatal hernia.         Labs:  Most Recent 3 CBC's:Recent Labs   Lab Test 09/07/21  0921 02/19/19  0546 02/18/19  1623   WBC 16.2* 16.9* 19.4*   HGB 10.4* 10.3* 11.6*   MCV 95 94 95    457* 491*     Most Recent 3 BMP's:Recent Labs   Lab Test 09/07/21  1725 09/07/21  1635 09/07/21  0921 02/19/19  0546 02/18/19  1623   NA  --   --  141 136 139   POTASSIUM 4.0  --  2.9* 4.3 3.4*   CHLORIDE  --   --  105 96* 94*   CO2  --   --  24 29 30   BUN  --   --  14 27 23   CR  --  0.80 0.86 0.95 1.07   ANIONGAP  --   --  12 11 15   JUAN FRANCISCO  --   --  8.3* 8.9 10.3   GLC  --   --  122 147* 110      Most Recent 2 LFT's:Recent Labs   Lab Test 09/07/21  0921 02/18/19  1623   AST 22 20   ALT 22 28   ALKPHOS 95 98   BILITOTAL 1.1* 1.1*     Most Recent 3 INR's:Recent Labs   Lab Test 09/07/21  1725   INR 1.10     Anticoagulation Dose History     Recent Dosing and Labs Latest Ref Rng & Units 9/7/2021    INR 0.85 - 1.15 1.10          Most Recent 3 Creatinines:Recent Labs   Lab Test 09/07/21  1635 09/07/21  0921 02/19/19  0546   CR 0.80 0.86 0.95     Most Recent 3 Hemoglobins:Recent Labs   Lab Test 09/07/21 0921 02/19/19  0546 02/18/19  1623   HGB 10.4* 10.3* 11.6*     Most Recent 3 Troponin's:No lab results found.  Most Recent 3 BNP's:No lab results found.  Most Recent D-dimer:Recent Labs   Lab Test 09/07/21 0921   DD 1.46*     Most Recent Cholesterol Panel:Recent Labs   Lab Test 05/26/16  1029   CHOL 200*      HDL 50   TRIG 155*     Most Recent 6 Bacteria Isolates From Any Culture (See EPIC Reports for Culture Details):No lab results found.  Most Recent TSH and T4:Recent Labs   Lab Test 02/19/19  0546   TSH 0.45     Most Recent Hemoglobin A1c:No lab results found.  Most Recent 6 glucoses:Recent Labs   Lab Test 09/07/21 0921 02/19/19  0546 02/18/19  1623    147* 110     Most Recent Urinalysis:Recent Labs   Lab Test 02/18/19 2002   COLOR Yellow   APPEARANCE Clear   URINEGLC Negative   URINEBILI Negative   URINEKETONE 25 mg/dL*   SG 1.005   UBLD Negative   URINEPH 9.0*   PROTEIN Negative   UROBILINOGEN <2.0 E.U./dL   NITRITE Negative   LEUKEST Negative     Most Recent ABG:No lab results found.  Most Recent ESR & CRP:Recent Labs   Lab Test 09/07/21 0921   CRP 18.9*     Most Recent Anemia Panel:Recent Labs   Lab Test 09/07/21 0921   WBC 16.2*   HGB 10.4*   HCT 32.1*   MCV 95        Most Recent CPK:No lab results found.  Patient was discussed with attending physician, Dr. Haley who agreed for the plan  Enedelia Espinoza MD  Family medicine residency program

## 2021-09-08 NOTE — PROGRESS NOTES
Microbiology called to inform that pt sputum sample had >10 epithelial cells which notes oral contamination. New order placed for sputum sample with gram stain, expectorant source.

## 2021-09-08 NOTE — PROGRESS NOTES
/65 (BP Location: Right arm)   Pulse 80   Temp 97.9  F (36.6  C) (Oral)   Resp 18   Ht 1.524 m (5')   Wt 73.7 kg (162 lb 6.4 oz)   SpO2 95%   BMI 31.72 kg/m      Patient is receiving duonebs 4 times a day. Breath sounds are diminished and clear. BS are improving. On 2 L AYANNA Gillespie RRT

## 2021-09-08 NOTE — PROGRESS NOTES
RESPIRATORY CARE NOTE     Patient Name: Tia Brooke  Today's Date: 9/8/2021       Pt admitted for COIVD and pneumonia, started on QID duo nebs and BID mucomyst. Pt currently on 2 lpm via nasal cannula, BS diminished/corase.     BP (!) 140/63 (BP Location: Right arm)   Pulse 68   Temp 98  F (36.7  C) (Oral)   Resp 18   Ht 1.524 m (5')   Wt 73.7 kg (162 lb 6.4 oz)   SpO2 96%   BMI 31.72 kg/m        Betsy Mendez, RT

## 2021-09-08 NOTE — PLAN OF CARE
"  Problem: Adult Inpatient Plan of Care  Goal: Plan of Care Review  Outcome: No Change     Problem: Fatigue  Goal: Improved Activity Tolerance  Outcome: No Change     Problem: Gas Exchange Impaired  Goal: Optimal Gas Exchange  Outcome: Improving     Problem: Activity Intolerance  Goal: Enhanced Capacity and Energy  Outcome: Improving     Patient alert and oriented and able to make needs known. Patient Calls appropriate and requires stand by assist in the room for transfers. Patient up in the chair for meals and rests in between. States \" I feel like I was hit by a truck and a train today\". Oxygen sats 95% on 2 liters. Patient can get IS up to 500. Will continue to monitor.  "

## 2021-09-09 ENCOUNTER — APPOINTMENT (OUTPATIENT)
Dept: PHYSICAL THERAPY | Facility: CLINIC | Age: 86
DRG: 177 | End: 2021-09-09
Attending: INTERNAL MEDICINE
Payer: MEDICARE

## 2021-09-09 LAB
ALBUMIN SERPL-MCNC: 2.3 G/DL (ref 3.5–5)
ALP SERPL-CCNC: 81 U/L (ref 45–120)
ALT SERPL W P-5'-P-CCNC: 22 U/L (ref 0–45)
ANION GAP SERPL CALCULATED.3IONS-SCNC: 13 MMOL/L (ref 5–18)
ANION GAP SERPL CALCULATED.3IONS-SCNC: 8 MMOL/L (ref 5–18)
AST SERPL W P-5'-P-CCNC: 20 U/L (ref 0–40)
BACTERIA SPT CULT: NORMAL
BACTERIA SPT CULT: NORMAL
BILIRUB DIRECT SERPL-MCNC: 0.2 MG/DL
BILIRUB SERPL-MCNC: 0.4 MG/DL (ref 0–1)
BUN SERPL-MCNC: 16 MG/DL (ref 8–28)
BUN SERPL-MCNC: 19 MG/DL (ref 8–28)
C REACTIVE PROTEIN LHE: 9.8 MG/DL (ref 0–0.8)
CALCIUM SERPL-MCNC: 8.6 MG/DL (ref 8.5–10.5)
CALCIUM SERPL-MCNC: 8.8 MG/DL (ref 8.5–10.5)
CHLORIDE BLD-SCNC: 106 MMOL/L (ref 98–107)
CHLORIDE BLD-SCNC: 107 MMOL/L (ref 98–107)
CO2 SERPL-SCNC: 22 MMOL/L (ref 22–31)
CO2 SERPL-SCNC: 26 MMOL/L (ref 22–31)
CREAT SERPL-MCNC: 0.81 MG/DL (ref 0.6–1.1)
CREAT SERPL-MCNC: 1.02 MG/DL (ref 0.6–1.1)
D DIMER PPP FEU-MCNC: 1.3 UG/ML FEU (ref 0–0.5)
ERYTHROCYTE [DISTWIDTH] IN BLOOD BY AUTOMATED COUNT: 13.9 % (ref 10–15)
FIBRINOGEN PPP-MCNC: 793 MG/DL (ref 170–490)
GFR SERPL CREATININE-BSD FRML MDRD: 49 ML/MIN/1.73M2
GFR SERPL CREATININE-BSD FRML MDRD: 65 ML/MIN/1.73M2
GLUCOSE BLD-MCNC: 114 MG/DL (ref 70–125)
GLUCOSE BLD-MCNC: 222 MG/DL (ref 70–125)
GRAM STAIN RESULT: NORMAL
HCT VFR BLD AUTO: 28.4 % (ref 35–47)
HGB BLD-MCNC: 9.2 G/DL (ref 11.7–15.7)
MAGNESIUM SERPL-MCNC: 1.9 MG/DL (ref 1.8–2.6)
MCH RBC QN AUTO: 30.8 PG (ref 26.5–33)
MCHC RBC AUTO-ENTMCNC: 32.4 G/DL (ref 31.5–36.5)
MCV RBC AUTO: 95 FL (ref 78–100)
PLATELET # BLD AUTO: 491 10E3/UL (ref 150–450)
POTASSIUM BLD-SCNC: 3.4 MMOL/L (ref 3.5–5)
POTASSIUM BLD-SCNC: 3.6 MMOL/L (ref 3.5–5)
PROT SERPL-MCNC: 5.9 G/DL (ref 6–8)
RBC # BLD AUTO: 2.99 10E6/UL (ref 3.8–5.2)
SODIUM SERPL-SCNC: 141 MMOL/L (ref 136–145)
SODIUM SERPL-SCNC: 141 MMOL/L (ref 136–145)
WBC # BLD AUTO: 15.1 10E3/UL (ref 4–11)

## 2021-09-09 PROCEDURE — 36415 COLL VENOUS BLD VENIPUNCTURE: CPT | Performed by: INTERNAL MEDICINE

## 2021-09-09 PROCEDURE — 87205 SMEAR GRAM STAIN: CPT | Performed by: FAMILY MEDICINE

## 2021-09-09 PROCEDURE — 80048 BASIC METABOLIC PNL TOTAL CA: CPT | Performed by: INTERNAL MEDICINE

## 2021-09-09 PROCEDURE — 258N000003 HC RX IP 258 OP 636: Performed by: INTERNAL MEDICINE

## 2021-09-09 PROCEDURE — 82040 ASSAY OF SERUM ALBUMIN: CPT | Performed by: INTERNAL MEDICINE

## 2021-09-09 PROCEDURE — 250N000013 HC RX MED GY IP 250 OP 250 PS 637: Performed by: STUDENT IN AN ORGANIZED HEALTH CARE EDUCATION/TRAINING PROGRAM

## 2021-09-09 PROCEDURE — 250N000013 HC RX MED GY IP 250 OP 250 PS 637: Performed by: FAMILY MEDICINE

## 2021-09-09 PROCEDURE — 85027 COMPLETE CBC AUTOMATED: CPT | Performed by: INTERNAL MEDICINE

## 2021-09-09 PROCEDURE — 99207 PR CDG-MDM COMPONENT: MEETS MODERATE - DOWN CODED: CPT | Performed by: INTERNAL MEDICINE

## 2021-09-09 PROCEDURE — 82310 ASSAY OF CALCIUM: CPT | Performed by: FAMILY MEDICINE

## 2021-09-09 PROCEDURE — 85384 FIBRINOGEN ACTIVITY: CPT | Performed by: INTERNAL MEDICINE

## 2021-09-09 PROCEDURE — 250N000011 HC RX IP 250 OP 636: Performed by: INTERNAL MEDICINE

## 2021-09-09 PROCEDURE — 36415 COLL VENOUS BLD VENIPUNCTURE: CPT | Performed by: FAMILY MEDICINE

## 2021-09-09 PROCEDURE — 999N000157 HC STATISTIC RCP TIME EA 10 MIN

## 2021-09-09 PROCEDURE — 83735 ASSAY OF MAGNESIUM: CPT | Performed by: FAMILY MEDICINE

## 2021-09-09 PROCEDURE — 97116 GAIT TRAINING THERAPY: CPT | Mod: GP

## 2021-09-09 PROCEDURE — 258N000003 HC RX IP 258 OP 636: Performed by: STUDENT IN AN ORGANIZED HEALTH CARE EDUCATION/TRAINING PROGRAM

## 2021-09-09 PROCEDURE — 120N000001 HC R&B MED SURG/OB

## 2021-09-09 PROCEDURE — 97162 PT EVAL MOD COMPLEX 30 MIN: CPT | Mod: GP

## 2021-09-09 PROCEDURE — 99232 SBSQ HOSP IP/OBS MODERATE 35: CPT | Mod: GC | Performed by: INTERNAL MEDICINE

## 2021-09-09 PROCEDURE — 94640 AIRWAY INHALATION TREATMENT: CPT

## 2021-09-09 PROCEDURE — 250N000011 HC RX IP 250 OP 636: Performed by: STUDENT IN AN ORGANIZED HEALTH CARE EDUCATION/TRAINING PROGRAM

## 2021-09-09 PROCEDURE — 250N000009 HC RX 250: Performed by: INTERNAL MEDICINE

## 2021-09-09 PROCEDURE — 86141 C-REACTIVE PROTEIN HS: CPT | Performed by: INTERNAL MEDICINE

## 2021-09-09 PROCEDURE — 85379 FIBRIN DEGRADATION QUANT: CPT | Performed by: INTERNAL MEDICINE

## 2021-09-09 PROCEDURE — 250N000013 HC RX MED GY IP 250 OP 250 PS 637: Performed by: INTERNAL MEDICINE

## 2021-09-09 PROCEDURE — 94640 AIRWAY INHALATION TREATMENT: CPT | Mod: 76

## 2021-09-09 RX ORDER — IPRATROPIUM BROMIDE AND ALBUTEROL SULFATE 2.5; .5 MG/3ML; MG/3ML
3 SOLUTION RESPIRATORY (INHALATION) 3 TIMES DAILY
Status: DISCONTINUED | OUTPATIENT
Start: 2021-09-09 | End: 2021-09-11

## 2021-09-09 RX ORDER — HYDRALAZINE HYDROCHLORIDE 10 MG/1
10 TABLET, FILM COATED ORAL 2 TIMES DAILY PRN
Status: DISCONTINUED | OUTPATIENT
Start: 2021-09-09 | End: 2021-09-12 | Stop reason: HOSPADM

## 2021-09-09 RX ORDER — POTASSIUM CHLORIDE 1500 MG/1
20 TABLET, EXTENDED RELEASE ORAL ONCE
Status: COMPLETED | OUTPATIENT
Start: 2021-09-09 | End: 2021-09-09

## 2021-09-09 RX ADMIN — ENOXAPARIN SODIUM 40 MG: 100 INJECTION SUBCUTANEOUS at 16:51

## 2021-09-09 RX ADMIN — Medication 1 MG: at 21:44

## 2021-09-09 RX ADMIN — IPRATROPIUM BROMIDE AND ALBUTEROL SULFATE 3 ML: .5; 3 SOLUTION RESPIRATORY (INHALATION) at 19:33

## 2021-09-09 RX ADMIN — CEFTRIAXONE 1 G: 1 INJECTION, POWDER, FOR SOLUTION INTRAMUSCULAR; INTRAVENOUS at 16:50

## 2021-09-09 RX ADMIN — FUROSEMIDE 20 MG: 20 TABLET ORAL at 09:25

## 2021-09-09 RX ADMIN — IPRATROPIUM BROMIDE AND ALBUTEROL SULFATE 3 ML: .5; 3 SOLUTION RESPIRATORY (INHALATION) at 08:00

## 2021-09-09 RX ADMIN — REMDESIVIR 100 MG: 100 INJECTION, POWDER, LYOPHILIZED, FOR SOLUTION INTRAVENOUS at 17:36

## 2021-09-09 RX ADMIN — AZITHROMYCIN MONOHYDRATE 500 MG: 500 INJECTION, POWDER, LYOPHILIZED, FOR SOLUTION INTRAVENOUS at 18:30

## 2021-09-09 RX ADMIN — ATORVASTATIN CALCIUM 20 MG: 10 TABLET, FILM COATED ORAL at 21:44

## 2021-09-09 RX ADMIN — ASPIRIN 81 MG: 81 TABLET, DELAYED RELEASE ORAL at 09:25

## 2021-09-09 RX ADMIN — CHOLECALCIFEROL TAB 25 MCG (1000 UNIT) 1000 UNITS: 25 TAB at 09:26

## 2021-09-09 RX ADMIN — MONTELUKAST 10 MG: 10 TABLET ORAL at 21:44

## 2021-09-09 RX ADMIN — FLUTICASONE FUROATE 1 PUFF: 100 POWDER RESPIRATORY (INHALATION) at 09:26

## 2021-09-09 RX ADMIN — PANTOPRAZOLE SODIUM 40 MG: 20 TABLET, DELAYED RELEASE ORAL at 06:49

## 2021-09-09 RX ADMIN — Medication 1 TABLET: at 09:25

## 2021-09-09 RX ADMIN — SODIUM CHLORIDE, PRESERVATIVE FREE 50 ML: 5 INJECTION INTRAVENOUS at 18:30

## 2021-09-09 RX ADMIN — DEXAMETHASONE SODIUM PHOSPHATE 6 MG: 10 INJECTION, SOLUTION INTRAMUSCULAR; INTRAVENOUS at 09:25

## 2021-09-09 RX ADMIN — IPRATROPIUM BROMIDE AND ALBUTEROL SULFATE 3 ML: .5; 3 SOLUTION RESPIRATORY (INHALATION) at 14:31

## 2021-09-09 RX ADMIN — POTASSIUM CHLORIDE 20 MEQ: 1500 TABLET, EXTENDED RELEASE ORAL at 21:44

## 2021-09-09 ASSESSMENT — MIFFLIN-ST. JEOR: SCORE: 1096.75

## 2021-09-09 NOTE — PROGRESS NOTES
[facility] Treatment Plan    Patient Score: 5  Patient Acuity: 5    Clinical Indication for Therapy: home regimen    Therapy Ordered: Duoneb changed to TID.    Assessment Summary: Breath sounds are clear. Strong productive cough.     Verna Santos, RT  9/9/2021

## 2021-09-09 NOTE — PROGRESS NOTES
Received phone call from microbiology lab in regards to patient's sputum sample stating that sample was contaminated and needs to be recollected. New order entered.

## 2021-09-09 NOTE — PROGRESS NOTES
[facility] MEDICINE PROGRESS NOTE      Identification/Summary: Tia Brooke is a 89 year old female with a past medical history of COPD, hypertension, HLD, [place], paroxysmal A. fib not on anticoagulation who was admitted on 9/7/2021 for cough and shortness of breath.  Patient received [employer] Covid vaccine, and known timing.Patient tested positive for COVID-19 and VBG showed hypoxia.  Patient needed 2 L of oxygen supplementation.  CT chest revealed no PE but positive for pneumonia and hiatal hernia.  Patient is started on remdesivir and dexamethasone, azithromycin and ceftriaxone.  Patient's conditions stable and improving.  O2 was turned down to 1 L    Assessment and Plan:  Acute respiratory failure with hypoxia   COVID-19 pneumonia/possible bacterial pneumonia  COPD exacerbation     History of COPD,SOB, productive cough, loss of taste and smell.  Positive COVID-19 exposure.  CBC revealed leukocytosis of 16.2.  Normal pro Darian which is exclude bacterial etiology.  CRP of 18.9.  VBG with normal pH but decreased PaO2, 19.  CT chest revealed no acute PE, positive for pneumonia and hiatal hernia.  Patient received [employer] Covid vaccine, unknown time  -Oxygen as needed  -Trend oxygen  -Compivent, 4X D  -Albuterol every 2 as needed  -Azithromycin  -Ceftriaxone  -Mucomyst  -Remdesivir  -Dexamethasone  -PTA montelukast           Hypokalemia.   Resolved  Patient on Lasix 20 mg.  -Replace with potassium replacement protocol     Normocytic anemia  -Monitoring    Hiatal hernia  Incidental finding, outpatient follow-up  Chronic conditions  HTN-hydralazine as needed, if SBP>180 or DBP>110  HLD-PTA atorvastatin  Paroxysmal A. Fib    Diet: Combination Diet Regular Diet Adult  DVT Prophylaxis:  Enoxaparin (Lovenox) SQ  Code Status: No CPR- Pre-arrest intubation OK    Anticipated possible discharge in 1 day  Interval History/Subjective:  Overnight patient was alert and  oriented x4, pleasant care.  Intermittent productive cough, feels less congested.  Able to wean to 1 L O2 via NC and sat in the mid 90s.  Also complained of exertional dyspnea.  Telemetry was normal with sinus rhythm.  Physical Exam/Objective:  Temp:  [97.5  F (36.4  C)-98  F (36.7  C)] 97.5  F (36.4  C)  Pulse:  [55-84] 56  Resp:  [16-18] 18  BP: (136-171)/(59-72) 141/63  FiO2 (%):  [1 %] 1 %  SpO2:  [89 %-95 %] 91 %    Body mass index is 32.3 kg/m .    GENERAL:  Alert, appears comfortable, in no acute distress, appears stated age   HEAD:  Normocephalic, without obvious abnormality, atraumatic   EYES:  PERRL, conjunctiva/corneas clear, no scleral icterus, EOM's intact   NOSE: Nares normal, septum midline, mucosa normal, no drainage   THROAT: Lips, mucosa, and tongue normal; teeth and gums normal, mouth moist   NECK: Supple, symmetrical, trachea midline   BACK:   Symmetric, no curvature, ROM normal   LUNGS:    Bilateral vibratory wheeze symmetric chest rise on inhalation, respirations unlabored   CHEST WALL:  No tenderness or deformity   HEART:  Regular rate and rhythm, S1 and S2 normal, no murmur, rub, or gallop    ABDOMEN:   Soft, non-tender, bowel sounds active all four quadrants, no masses, no organomegaly, no rebound or guarding   EXTREMITIES: Extremities normal, atraumatic, no cyanosis or edema    SKIN: Dry to touch, no exanthems in the visualized areas   NEURO: Alert, oriented x3, moves all four extremities freely   PSYCH: Cooperative, behavior is appropriate      Medications:   Personally Reviewed.  Medications     - MEDICATION INSTRUCTIONS -         remdesivir  100 mg Intravenous Q24H    And     sodium chloride 0.9%  50 mL Intravenous Q24H     aspirin  81 mg Oral Daily     atorvastatin  20 mg Oral At Bedtime     azithromycin  500 mg Intravenous Q24H     calcium carbonate-vitamin D  1 tablet Oral Daily     cefTRIAXone  1 g Intravenous Q24H     dexamethasone  6 mg Intravenous Daily     enoxaparin  ANTICOAGULANT  40 mg Subcutaneous Q24H     fluticasone  1 puff Inhalation QAM     furosemide  20 mg Oral QAM     ipratropium - albuterol 0.5 mg/2.5 mg/3 mL  3 mL Nebulization TID     montelukast  10 mg Oral At Bedtime     pantoprazole  40 mg Oral QAM AC     sodium chloride (PF)  3 mL Intracatheter Q8H     sodium chloride (PF)  3 mL Intracatheter Q8H     Vitamin D3  1,000 Units Oral QAM       Data reviewed today: I personally reviewed all new medications, labs, imaging/diagnostics reports over the past 24 hours. Pertinent findings include:    Imaging:   No results found for this or any previous visit (from the past 24 hour(s)).    Labs:  Most Recent 3 CBC's:  Recent Labs   Lab Test 09/09/21 0726 09/08/21 1200 09/07/21 0921   WBC 15.1* 15.3* 16.2*   HGB 9.2* 9.5* 10.4*   MCV 95 95 95   * 460* 425     Most Recent 3 BMP's:  Recent Labs   Lab Test 09/09/21 0726 09/08/21 1200 09/07/21  1725 09/07/21  1635 09/07/21 0921    139  --   --  141   POTASSIUM 3.6 3.7 4.0  --  2.9*   CHLORIDE 107 105  --   --  105   CO2 26 26  --   --  24   BUN 16 15  --   --  14   CR 0.81 0.94  --  0.80 0.86   ANIONGAP 8 8  --   --  12   JUAN FRANCISCO 8.6 8.7  --   --  8.3*    157*  --   --  122     Most Recent 2 LFT's:  Recent Labs   Lab Test 09/09/21 0726 09/07/21 0921   AST 20 22   ALT 22 22   ALKPHOS 81 95   BILITOTAL 0.4 1.1*     Most Recent 3 INR's:  Recent Labs   Lab Test 09/07/21  1725   INR 1.10     Anticoagulation Dose History     Recent Dosing and Labs Latest Ref Rng & Units 9/7/2021    INR 0.85 - 1.15 1.10          Most Recent 3 Creatinines:  Recent Labs   Lab Test 09/09/21 0726 09/08/21 1200 09/07/21  1635   CR 0.81 0.94 0.80     Most Recent 3 Hemoglobins:  Recent Labs   Lab Test 09/09/21  0726 09/08/21  1200 09/07/21  0921   HGB 9.2* 9.5* 10.4*     Most Recent 3 Troponin's:No lab results found.  Most Recent 3 BNP's:No lab results found.  Most Recent D-dimer:  Recent Labs   Lab Test 09/09/21  0726   DD 1.30*      Most Recent Cholesterol Panel:  Recent Labs   Lab Test 05/26/16  1029   CHOL 200*      HDL 50   TRIG 155*     Most Recent 6 Bacteria Isolates From Any Culture (See EPIC Reports for Culture Details):No lab results found.  Most Recent TSH and T4:  Recent Labs   Lab Test 02/19/19  0546   TSH 0.45     Most Recent Hemoglobin A1c:No lab results found.  Most Recent 6 glucoses:  Recent Labs   Lab Test 09/09/21  0726 09/08/21  1200 09/07/21  0921 02/19/19  0546 02/18/19  1623    157* 122 147* 110     Most Recent Urinalysis:  Recent Labs   Lab Test 02/18/19 2002   COLOR Yellow   APPEARANCE Clear   URINEGLC Negative   URINEBILI Negative   URINEKETONE 25 mg/dL*   SG 1.005   UBLD Negative   URINEPH 9.0*   PROTEIN Negative   UROBILINOGEN <2.0 E.U./dL   NITRITE Negative   LEUKEST Negative     Most Recent ABG:No lab results found.  Most Recent ESR & CRP:  Recent Labs   Lab Test 09/09/21  0726   CRP 9.8*     Most Recent Anemia Panel:  Recent Labs   Lab Test 09/09/21  0726   WBC 15.1*   HGB 9.2*   HCT 28.4*   MCV 95   *     Most Recent CPK:No lab results found.  Patient was discussed with attending physician, Dr. Haley who agreed for the plan  Enedelia Espinoza MD  Family medicine residency program

## 2021-09-09 NOTE — PROGRESS NOTES
BP (!) 141/63 (BP Location: Left arm)   Pulse 66   Temp 97.7  F (36.5  C) (Oral)   Resp 18   Ht 1.524 m (5')   Wt 75 kg (165 lb 6.4 oz)   SpO2 96%   BMI 32.30 kg/m       Patient on 1L NC. Breath sounds are improved to clear. Strong cough. RT continue to follow.     Verna JOE

## 2021-09-09 NOTE — PLAN OF CARE
"Patient continues on 1L O2 via nasal cannula. Attempted to wean to room air, but patient had decrease in sats to 88%. Patient also had decrease in sats with ambulation to the bathroom, down to 86%. Patient able to recover sats >90% within 1 minute at rest. Patient continues to have a congested, productive cough and sputum sample sent. Patient using IS independently in the room.   Patient reports minimal appetite and being unable to taste or smell, but is eating because \"I know that I have to.\"  Patient overall reports feeling better. Hopeful for discharge on 9/10.     "

## 2021-09-09 NOTE — PLAN OF CARE
"  Problem: Fatigue  Goal: Improved Activity Tolerance  Outcome: Improving  Intervention: Promote Energy Conservation  Recent Flowsheet Documentation  Taken 9/8/2021 2140 by Kathy Luo RN  Activity Management: (back to bed) ambulated to bathroom     Problem: Gas Exchange Impaired  Goal: Optimal Gas Exchange  Outcome: Improving     Problem: Adult Inpatient Plan of Care  Goal: Readiness for Transition of Care  Outcome: Improving       Patient alert and oriented x 4, pleasant with cares. Intermittent, productive cough persists however patient states she feels less congested. Able to wean to 1L O2 via nasal cannula, saturating in mid 90's at this flow rate. Able to get incentive spirometer up to 1500mL. Shortness of breath noted with exertion, pursed lip breathing encouraged. Activity tolerance improving per patient, she has been \"taking laps\" around her bed and tolerates ambulating well. PIV both saline locked. On potassium protocol with recheck scheduled for AM. Tele normal sinus rhythm. Bed alarm utilized overnight for patient safety. Calls appropriately for assistance.   "

## 2021-09-10 ENCOUNTER — APPOINTMENT (OUTPATIENT)
Dept: CARDIOLOGY | Facility: CLINIC | Age: 86
DRG: 177 | End: 2021-09-10
Attending: INTERNAL MEDICINE
Payer: MEDICARE

## 2021-09-10 ENCOUNTER — APPOINTMENT (OUTPATIENT)
Dept: PHYSICAL THERAPY | Facility: CLINIC | Age: 86
DRG: 177 | End: 2021-09-10
Payer: MEDICARE

## 2021-09-10 LAB
ALBUMIN SERPL-MCNC: 2.2 G/DL (ref 3.5–5)
ALP SERPL-CCNC: 71 U/L (ref 45–120)
ALT SERPL W P-5'-P-CCNC: 22 U/L (ref 0–45)
ANION GAP SERPL CALCULATED.3IONS-SCNC: 8 MMOL/L (ref 5–18)
AST SERPL W P-5'-P-CCNC: 21 U/L (ref 0–40)
BILIRUB DIRECT SERPL-MCNC: 0.2 MG/DL
BILIRUB SERPL-MCNC: 0.4 MG/DL (ref 0–1)
BUN SERPL-MCNC: 18 MG/DL (ref 8–28)
C REACTIVE PROTEIN LHE: 5.4 MG/DL (ref 0–0.8)
CALCIUM SERPL-MCNC: 8.4 MG/DL (ref 8.5–10.5)
CHLORIDE BLD-SCNC: 108 MMOL/L (ref 98–107)
CO2 SERPL-SCNC: 26 MMOL/L (ref 22–31)
CREAT SERPL-MCNC: 0.76 MG/DL (ref 0.6–1.1)
D DIMER PPP FEU-MCNC: 1.02 UG/ML FEU (ref 0–0.5)
ERYTHROCYTE [DISTWIDTH] IN BLOOD BY AUTOMATED COUNT: 13.8 % (ref 10–15)
FIBRINOGEN PPP-MCNC: 672 MG/DL (ref 170–490)
GFR SERPL CREATININE-BSD FRML MDRD: 70 ML/MIN/1.73M2
GLUCOSE BLD-MCNC: 118 MG/DL (ref 70–125)
HCT VFR BLD AUTO: 28.2 % (ref 35–47)
HGB BLD-MCNC: 9.3 G/DL (ref 11.7–15.7)
LVEF ECHO: NORMAL
MAGNESIUM SERPL-MCNC: 1.9 MG/DL (ref 1.8–2.6)
MCH RBC QN AUTO: 31.4 PG (ref 26.5–33)
MCHC RBC AUTO-ENTMCNC: 33 G/DL (ref 31.5–36.5)
MCV RBC AUTO: 95 FL (ref 78–100)
PLATELET # BLD AUTO: 462 10E3/UL (ref 150–450)
POTASSIUM BLD-SCNC: 3.7 MMOL/L (ref 3.5–5)
POTASSIUM BLD-SCNC: 3.9 MMOL/L (ref 3.5–5)
POTASSIUM BLD-SCNC: 3.9 MMOL/L (ref 3.5–5)
PROT SERPL-MCNC: 5.3 G/DL (ref 6–8)
RBC # BLD AUTO: 2.96 10E6/UL (ref 3.8–5.2)
SODIUM SERPL-SCNC: 142 MMOL/L (ref 136–145)
TROPONIN I SERPL-MCNC: 0.01 NG/ML (ref 0–0.29)
WBC # BLD AUTO: 10.1 10E3/UL (ref 4–11)

## 2021-09-10 PROCEDURE — 85379 FIBRIN DEGRADATION QUANT: CPT | Performed by: INTERNAL MEDICINE

## 2021-09-10 PROCEDURE — 94640 AIRWAY INHALATION TREATMENT: CPT | Mod: 76

## 2021-09-10 PROCEDURE — 250N000011 HC RX IP 250 OP 636: Performed by: STUDENT IN AN ORGANIZED HEALTH CARE EDUCATION/TRAINING PROGRAM

## 2021-09-10 PROCEDURE — 97116 GAIT TRAINING THERAPY: CPT | Mod: GP

## 2021-09-10 PROCEDURE — 85384 FIBRINOGEN ACTIVITY: CPT | Performed by: INTERNAL MEDICINE

## 2021-09-10 PROCEDURE — 250N000009 HC RX 250: Performed by: INTERNAL MEDICINE

## 2021-09-10 PROCEDURE — 97110 THERAPEUTIC EXERCISES: CPT | Mod: GP

## 2021-09-10 PROCEDURE — 84484 ASSAY OF TROPONIN QUANT: CPT | Performed by: INTERNAL MEDICINE

## 2021-09-10 PROCEDURE — 250N000013 HC RX MED GY IP 250 OP 250 PS 637: Performed by: INTERNAL MEDICINE

## 2021-09-10 PROCEDURE — 86141 C-REACTIVE PROTEIN HS: CPT | Performed by: INTERNAL MEDICINE

## 2021-09-10 PROCEDURE — 120N000001 HC R&B MED SURG/OB

## 2021-09-10 PROCEDURE — 97530 THERAPEUTIC ACTIVITIES: CPT | Mod: GP

## 2021-09-10 PROCEDURE — 99207 PR NO CHARGE LOS: CPT | Performed by: INTERNAL MEDICINE

## 2021-09-10 PROCEDURE — 85027 COMPLETE CBC AUTOMATED: CPT | Performed by: INTERNAL MEDICINE

## 2021-09-10 PROCEDURE — 258N000003 HC RX IP 258 OP 636: Performed by: INTERNAL MEDICINE

## 2021-09-10 PROCEDURE — 250N000013 HC RX MED GY IP 250 OP 250 PS 637: Performed by: STUDENT IN AN ORGANIZED HEALTH CARE EDUCATION/TRAINING PROGRAM

## 2021-09-10 PROCEDURE — 84132 ASSAY OF SERUM POTASSIUM: CPT | Performed by: INTERNAL MEDICINE

## 2021-09-10 PROCEDURE — 250N000011 HC RX IP 250 OP 636: Performed by: INTERNAL MEDICINE

## 2021-09-10 PROCEDURE — 83735 ASSAY OF MAGNESIUM: CPT | Performed by: INTERNAL MEDICINE

## 2021-09-10 PROCEDURE — 94640 AIRWAY INHALATION TREATMENT: CPT

## 2021-09-10 PROCEDURE — 99233 SBSQ HOSP IP/OBS HIGH 50: CPT | Performed by: INTERNAL MEDICINE

## 2021-09-10 PROCEDURE — 93306 TTE W/DOPPLER COMPLETE: CPT | Mod: 26 | Performed by: INTERNAL MEDICINE

## 2021-09-10 PROCEDURE — 82310 ASSAY OF CALCIUM: CPT | Performed by: INTERNAL MEDICINE

## 2021-09-10 PROCEDURE — 36415 COLL VENOUS BLD VENIPUNCTURE: CPT | Performed by: INTERNAL MEDICINE

## 2021-09-10 PROCEDURE — 999N000157 HC STATISTIC RCP TIME EA 10 MIN

## 2021-09-10 PROCEDURE — 82040 ASSAY OF SERUM ALBUMIN: CPT | Performed by: INTERNAL MEDICINE

## 2021-09-10 PROCEDURE — 93306 TTE W/DOPPLER COMPLETE: CPT

## 2021-09-10 PROCEDURE — 250N000013 HC RX MED GY IP 250 OP 250 PS 637: Performed by: FAMILY MEDICINE

## 2021-09-10 PROCEDURE — 80048 BASIC METABOLIC PNL TOTAL CA: CPT | Performed by: INTERNAL MEDICINE

## 2021-09-10 RX ORDER — CEFDINIR 300 MG/1
300 CAPSULE ORAL EVERY 12 HOURS SCHEDULED
Status: DISCONTINUED | OUTPATIENT
Start: 2021-09-10 | End: 2021-09-12 | Stop reason: HOSPADM

## 2021-09-10 RX ORDER — FUROSEMIDE 10 MG/ML
20 INJECTION INTRAMUSCULAR; INTRAVENOUS ONCE
Status: COMPLETED | OUTPATIENT
Start: 2021-09-10 | End: 2021-09-10

## 2021-09-10 RX ORDER — AZITHROMYCIN 250 MG/1
500 TABLET, FILM COATED ORAL DAILY
Status: COMPLETED | OUTPATIENT
Start: 2021-09-10 | End: 2021-09-11

## 2021-09-10 RX ADMIN — CHOLECALCIFEROL TAB 25 MCG (1000 UNIT) 1000 UNITS: 25 TAB at 07:25

## 2021-09-10 RX ADMIN — FUROSEMIDE 20 MG: 20 TABLET ORAL at 07:25

## 2021-09-10 RX ADMIN — IPRATROPIUM BROMIDE AND ALBUTEROL SULFATE 3 ML: .5; 3 SOLUTION RESPIRATORY (INHALATION) at 19:56

## 2021-09-10 RX ADMIN — AZITHROMYCIN 500 MG: 250 TABLET, FILM COATED ORAL at 19:16

## 2021-09-10 RX ADMIN — REMDESIVIR 100 MG: 100 INJECTION, POWDER, LYOPHILIZED, FOR SOLUTION INTRAVENOUS at 18:08

## 2021-09-10 RX ADMIN — ASPIRIN 81 MG: 81 TABLET, DELAYED RELEASE ORAL at 07:25

## 2021-09-10 RX ADMIN — SODIUM CHLORIDE, PRESERVATIVE FREE 50 ML: 5 INJECTION INTRAVENOUS at 19:16

## 2021-09-10 RX ADMIN — DEXAMETHASONE SODIUM PHOSPHATE 6 MG: 10 INJECTION, SOLUTION INTRAMUSCULAR; INTRAVENOUS at 08:34

## 2021-09-10 RX ADMIN — PANTOPRAZOLE SODIUM 40 MG: 20 TABLET, DELAYED RELEASE ORAL at 07:25

## 2021-09-10 RX ADMIN — Medication 1 TABLET: at 07:25

## 2021-09-10 RX ADMIN — IPRATROPIUM BROMIDE AND ALBUTEROL SULFATE 3 ML: .5; 3 SOLUTION RESPIRATORY (INHALATION) at 07:57

## 2021-09-10 RX ADMIN — CEFDINIR 300 MG: 300 CAPSULE ORAL at 19:16

## 2021-09-10 RX ADMIN — ENOXAPARIN SODIUM 40 MG: 100 INJECTION SUBCUTANEOUS at 16:55

## 2021-09-10 RX ADMIN — MONTELUKAST 10 MG: 10 TABLET ORAL at 21:08

## 2021-09-10 RX ADMIN — IPRATROPIUM BROMIDE AND ALBUTEROL SULFATE 3 ML: .5; 3 SOLUTION RESPIRATORY (INHALATION) at 13:15

## 2021-09-10 RX ADMIN — FUROSEMIDE 20 MG: 10 INJECTION, SOLUTION INTRAMUSCULAR; INTRAVENOUS at 08:32

## 2021-09-10 RX ADMIN — FLUTICASONE FUROATE 1 PUFF: 100 POWDER RESPIRATORY (INHALATION) at 07:28

## 2021-09-10 RX ADMIN — Medication 1 MG: at 21:08

## 2021-09-10 RX ADMIN — ATORVASTATIN CALCIUM 20 MG: 10 TABLET, FILM COATED ORAL at 21:08

## 2021-09-10 ASSESSMENT — MIFFLIN-ST. JEOR: SCORE: 1117.61

## 2021-09-10 NOTE — PLAN OF CARE
Problem: Adult Inpatient Plan of Care  Goal: Plan of Care Review  Outcome: Improving  Problem: Gas Exchange Impaired  Goal: Optimal Gas Exchange  Outcome: Improving  Pt A/O x4, denied pain throughout the shift, calls appropriately, ambulated A1 with walker and gait belt, weaning O2, pt on room air most of the shift, O2 sat at room air 92-95%, pt desat with activity but able to recover quickly with deep breathing, encouraging IS used, pt tolerating well, strong productive cough, oximetry monitor, pt on 1L oxygen nasal cannula at HS, telemetry NSR with PVCs, pt did have 2 runs of MD Roque notified, VSS, lab collected, potassium replaced per protocol, rechecked at 0145, daughter Alda called updated, continue to monitor pt status.

## 2021-09-10 NOTE — PLAN OF CARE
Problem: Fatigue  Goal: Improved Activity Tolerance  Outcome: Improving  Intervention: Promote Energy Conservation  Recent Flowsheet Documentation  Taken 9/10/2021 0733 by Toya Betancourt, RN  Activity Management:    ambulated to bathroom    up in chair     Problem: Activity Intolerance  Goal: Enhanced Capacity and Energy  Outcome: Improving  Intervention: Optimize Activity Tolerance  Recent Flowsheet Documentation  Taken 9/10/2021 0733 by Toya Betancourt, RN  Activity Management:    ambulated to bathroom    up in chair    Pt A/O.   Productive cough. Reports Loss of taste. Did eat all of her breakfast.  Was on 1L 02 overnight. Weaned to 1/2LPM with AM.   RT in for neb this AM. O2 turned off to evaluate.  Pt fluctuating between 88-94% on Room Air during checks. Physical Therapist reported sats in 80's when pt was up to bathroom.  Legs increasingly edematous this Am. Extra one time dose of IV lasix given.   Received IV Decadron.  On tele - NSR.  On K protocol. Addressed. Recheck tomorrow morning.  Bed alarms on for safety.

## 2021-09-10 NOTE — PROGRESS NOTES
Pt given Duoneb as ordered, per home routine.  Pt had been on 1 LPM NC.  Pt currently on RA - O2 sats 93%  RR-18-20, HR 60-70, BS diminished.  Will continue to monitor pt.

## 2021-09-10 NOTE — PROGRESS NOTES
I have reviewed rhythm strips.  They clearly show motion artifact with normal QRSs/sinus rhythm marching through.  No need for cardiology consult

## 2021-09-10 NOTE — PLAN OF CARE
Patient remains on room air with sats in the low 90's.  Edema of the BLE. Received IV Lasix from morning nurse.  Patient is feeling better overall and interested in discharge on 9/11.  Echo completed this afternoon.

## 2021-09-10 NOTE — PROGRESS NOTES
Care Management Follow Up    Length of Stay (days): 3    Expected Discharge Date: 09/11/2021     Concerns to be Addressed:       Patient plan of care discussed at interdisciplinary rounds: Yes    Anticipated Discharge Disposition:  Home vs. TCU     Anticipated Discharge Services:  RN, PT, and OT.    Patient/family educated on Medicare website which has current facility and service quality ratings:  Yes  Education Provided on the Discharge Plan:  Yes  Patient/Family in Agreement with the Plan:  CM has called and left a message with pt's son at [phone] asking for a return call. He lives in the home with pt.    Referrals Placed by CM/SW: COVID TCU referrals sent on 9/9.    Additional Information:  TCUs had been referred to and home care services will be referred to if pt and her son agree to home with services upon discharge.    At this time, pt is stating that she has everything she needs on her level of the home, is independent while ambulating with a walker, and that she has four phones, one of which she keeps on her walker. She adamantly refuses TCU placement and states her son will provide care and she will do her exercises twice daily independently.    A more clear direction will been available after discussion between CM and pt's son.    CM to follow.    Loyd Mata RN

## 2021-09-10 NOTE — PROGRESS NOTES
[facility] Medicine PROGRESS NOTE      Identification/Summary: Tia Brooke is a 89 year old female with a past medical history of COPD, hypertension, HLD, Carlos, paroxysmal A. fib not on anticoagulation who was admitted on 9/7/2021 for cough and shortness of breath.  Patient received Sher[employer] & Sherif Covid vaccine, and known timing.Patient tested positive for COVID-19 and VBG showed hypoxia.  Patient needed 2 L of oxygen supplementation.  CT chest revealed no PE but positive for pneumonia and hiatal hernia.  Patient is started on remdesivir and dexamethasone, azithromycin and ceftriaxone.  Patient's conditions stable and improving.  She desaturates with activity to upper 80s.  PT OT recommending TCU.  Likely discharge tomorrow wit are without oxygen.  Gave 1 dose of IV Lasix today for lower extremity edema.     Addendum 100, I was notified of patient having v.tach on telemonitor, she denies any associated new symptoms. Appears sustained, Will check electrolytes, echo troponin, cardiology to see.    Assessment and Plan:  Acute respiratory failure with hypoxia   COVID-19 pneumonia/possible bacterial pneumonia  COPD exacerbation   History of COPD,SOB, productive cough, loss of taste and smell.  Positive COVID-19 exposure.  CBC revealed leukocytosis of 16.2.  Normal pro Darian which is exclude bacterial etiology.  CRP of 18.9.  VBG with normal pH but decreased PaO2, 19.  CT chest revealed no acute PE, positive for pneumonia and hiatal hernia.  Patient received Sherif [employer] Sherif Covid vaccine, unknown time  S/p IV ceftriaxone, azithromycin  Change to cefdinir, azithromycin  Mucomyst  -Remdesivir  -Dexamethasone  -PTA montelukast     Hypokalemia.   Resolved  Patient on Lasix 20 mg.  -Replace with potassium replacement protocol     Normocytic anemia  -Monitoring     Hiatal hernia  Incidental finding, outpatient follow-up  Chronic conditions  HTN-hydralazine as needed, if SBP>180 or DBP>110  HLD-PTA  atorvastatin  Paroxysmal A. Fib  Not on any rate control medications.    Diet: Combination Diet Regular Diet Adult  DVT Prophylaxis: Lovenox  Code Status: No CPR- Pre-arrest intubation OK    Anticipated possible discharge in 1 days to TCU once clinical improvement milestones are met.    Interval History/Subjective:  She is still coughing up phlegm.  Shortness of breath is improved but still persists.  No chest pain.  No other nausea vomiting abdominal pain.  No change in bowels.    Physical Exam/Objective:  Vitals I/O   {   Choose vital ranges       Vital signs:  Temp: 97.9  F (36.6  C) Temp src: Oral BP: (!) 149/67 Pulse: 68   Resp: 20 SpO2: 92 % O2 Device: None (Room air) Oxygen Delivery: 1/2 LPM Height: 152.4 cm (5') Weight: 77.1 kg (170 lb)  Estimated body mass index is 33.2 kg/m  as calculated from the following:    Height as of this encounter: 1.524 m (5').    Weight as of this encounter: 77.1 kg (170 lb).     I/O last 3 completed shifts:  In: 792 [P.O.:340; I.V.:452]  Out: 1200 [Urine:1200]     Body mass index is 33.2 kg/m .    General Appearance:  Alert, cooperative, no distress   Head:  Normocephalic, atraumatic   Eyes:  PERRL    Throat:  mucosa; moist   Neck: No JVD, thyromegaly   Lungs:    Breathing appears comfortable    Chest Wall:  No tenderness or deformity   Heart:  Regular rate and rhythm, S1, S2 normal,no murmur   Abdomen:   Soft, non tender, non distended, bowel sounds present, no guarding or rigidity   Extremities:  1+ pitting edema, no joint swelling   Skin: Skin color, texture, turgor normal, no rashes or lesions   Neurologic: Alert and oriented X 3, Moves all 4 extremities       Medications:   Personally Reviewed.    remdesivir  100 mg Intravenous Q24H    And     sodium chloride 0.9%  50 mL Intravenous Q24H     aspirin  81 mg Oral Daily     atorvastatin  20 mg Oral At Bedtime     azithromycin  500 mg Intravenous Q24H     calcium carbonate-vitamin D  1 tablet Oral Daily     cefTRIAXone  1 g  Intravenous Q24H     dexamethasone  6 mg Intravenous Daily     enoxaparin ANTICOAGULANT  40 mg Subcutaneous Q24H     fluticasone  1 puff Inhalation QAM     furosemide  20 mg Oral QAM     ipratropium - albuterol 0.5 mg/2.5 mg/3 mL  3 mL Nebulization TID     montelukast  10 mg Oral At Bedtime     pantoprazole  40 mg Oral QAM AC     sodium chloride (PF)  3 mL Intracatheter Q8H     sodium chloride (PF)  3 mL Intracatheter Q8H     Vitamin D3  1,000 Units Oral QAM       Data reviewed today: I personally reviewed all new medications, labs, imaging/diagnostics reports over the past 24 hours. Pertinent findings include    Labs:  Most Recent 3 CBC's:Recent Labs   Lab Test 09/10/21  0543 09/09/21 0726 09/08/21  1200   WBC 10.1 15.1* 15.3*   HGB 9.3* 9.2* 9.5*   MCV 95 95 95   * 491* 460*     Most Recent 3 BMP's:Recent Labs   Lab Test 09/10/21  0543 09/10/21  0149 09/09/21 2032 09/09/21  0726     --  141 141   POTASSIUM 3.9 3.9 3.4* 3.6   CHLORIDE 108*  --  106 107   CO2 26  --  22 26   BUN 18  --  19 16   CR 0.76  --  1.02 0.81   ANIONGAP 8  --  13 8   JUAN FRANCISCO 8.4*  --  8.8 8.6     --  222* 114     Most Recent 2 LFT's:Recent Labs   Lab Test 09/09/21 0726 09/07/21  0921   AST 20 22   ALT 22 22   ALKPHOS 81 95   BILITOTAL 0.4 1.1*     Most Recent D-dimer:Recent Labs   Lab Test 09/10/21  0543   DD 1.02*        Ryanne Haley MD  Hospitalist  [facility]

## 2021-09-11 ENCOUNTER — APPOINTMENT (OUTPATIENT)
Dept: PHYSICAL THERAPY | Facility: CLINIC | Age: 86
DRG: 177 | End: 2021-09-11
Payer: MEDICARE

## 2021-09-11 LAB
ALBUMIN SERPL-MCNC: 2.4 G/DL (ref 3.5–5)
ALP SERPL-CCNC: 93 U/L (ref 45–120)
ALT SERPL W P-5'-P-CCNC: 25 U/L (ref 0–45)
ANION GAP SERPL CALCULATED.3IONS-SCNC: 10 MMOL/L (ref 5–18)
ANION GAP SERPL CALCULATED.3IONS-SCNC: 9 MMOL/L (ref 5–18)
AST SERPL W P-5'-P-CCNC: 23 U/L (ref 0–40)
BILIRUB SERPL-MCNC: 0.7 MG/DL (ref 0–1)
BNP SERPL-MCNC: 119 PG/ML (ref 0–167)
BUN SERPL-MCNC: 20 MG/DL (ref 8–28)
BUN SERPL-MCNC: 21 MG/DL (ref 8–28)
C REACTIVE PROTEIN LHE: 3.7 MG/DL (ref 0–0.8)
CALCIUM SERPL-MCNC: 9 MG/DL (ref 8.5–10.5)
CALCIUM SERPL-MCNC: 9.4 MG/DL (ref 8.5–10.5)
CHLORIDE BLD-SCNC: 104 MMOL/L (ref 98–107)
CHLORIDE BLD-SCNC: 105 MMOL/L (ref 98–107)
CO2 SERPL-SCNC: 25 MMOL/L (ref 22–31)
CO2 SERPL-SCNC: 29 MMOL/L (ref 22–31)
CREAT SERPL-MCNC: 0.85 MG/DL (ref 0.6–1.1)
CREAT SERPL-MCNC: 1.05 MG/DL (ref 0.6–1.1)
D DIMER PPP FEU-MCNC: 1.28 UG/ML FEU (ref 0–0.5)
ERYTHROCYTE [DISTWIDTH] IN BLOOD BY AUTOMATED COUNT: 13.7 % (ref 10–15)
FIBRINOGEN PPP-MCNC: 701 MG/DL (ref 170–490)
GFR SERPL CREATININE-BSD FRML MDRD: 47 ML/MIN/1.73M2
GFR SERPL CREATININE-BSD FRML MDRD: 61 ML/MIN/1.73M2
GLUCOSE BLD-MCNC: 105 MG/DL (ref 70–125)
GLUCOSE BLD-MCNC: 176 MG/DL (ref 70–125)
HCT VFR BLD AUTO: 30.7 % (ref 35–47)
HGB BLD-MCNC: 10 G/DL (ref 11.7–15.7)
MAGNESIUM SERPL-MCNC: 2.1 MG/DL (ref 1.8–2.6)
MCH RBC QN AUTO: 30.4 PG (ref 26.5–33)
MCHC RBC AUTO-ENTMCNC: 32.6 G/DL (ref 31.5–36.5)
MCV RBC AUTO: 93 FL (ref 78–100)
PLATELET # BLD AUTO: 569 10E3/UL (ref 150–450)
POTASSIUM BLD-SCNC: 3.6 MMOL/L (ref 3.5–5)
POTASSIUM BLD-SCNC: 3.6 MMOL/L (ref 3.5–5)
PROCALCITONIN SERPL-MCNC: 0.06 NG/ML (ref 0–0.49)
PROT SERPL-MCNC: 6 G/DL (ref 6–8)
RBC # BLD AUTO: 3.29 10E6/UL (ref 3.8–5.2)
SODIUM SERPL-SCNC: 140 MMOL/L (ref 136–145)
SODIUM SERPL-SCNC: 142 MMOL/L (ref 136–145)
TROPONIN I SERPL-MCNC: 0.01 NG/ML (ref 0–0.29)
TSH SERPL DL<=0.005 MIU/L-ACNC: 0.51 UIU/ML (ref 0.3–5)
WBC # BLD AUTO: 10.2 10E3/UL (ref 4–11)

## 2021-09-11 PROCEDURE — 120N000001 HC R&B MED SURG/OB

## 2021-09-11 PROCEDURE — 86141 C-REACTIVE PROTEIN HS: CPT | Performed by: INTERNAL MEDICINE

## 2021-09-11 PROCEDURE — 83735 ASSAY OF MAGNESIUM: CPT | Performed by: INTERNAL MEDICINE

## 2021-09-11 PROCEDURE — 250N000011 HC RX IP 250 OP 636: Performed by: INTERNAL MEDICINE

## 2021-09-11 PROCEDURE — 250N000011 HC RX IP 250 OP 636: Performed by: STUDENT IN AN ORGANIZED HEALTH CARE EDUCATION/TRAINING PROGRAM

## 2021-09-11 PROCEDURE — 85014 HEMATOCRIT: CPT | Performed by: INTERNAL MEDICINE

## 2021-09-11 PROCEDURE — 258N000003 HC RX IP 258 OP 636: Performed by: INTERNAL MEDICINE

## 2021-09-11 PROCEDURE — 97110 THERAPEUTIC EXERCISES: CPT | Mod: GP

## 2021-09-11 PROCEDURE — 250N000009 HC RX 250: Performed by: INTERNAL MEDICINE

## 2021-09-11 PROCEDURE — 93005 ELECTROCARDIOGRAM TRACING: CPT | Performed by: INTERNAL MEDICINE

## 2021-09-11 PROCEDURE — 36415 COLL VENOUS BLD VENIPUNCTURE: CPT | Performed by: INTERNAL MEDICINE

## 2021-09-11 PROCEDURE — 250N000013 HC RX MED GY IP 250 OP 250 PS 637: Performed by: FAMILY MEDICINE

## 2021-09-11 PROCEDURE — 93005 ELECTROCARDIOGRAM TRACING: CPT

## 2021-09-11 PROCEDURE — 84484 ASSAY OF TROPONIN QUANT: CPT | Performed by: INTERNAL MEDICINE

## 2021-09-11 PROCEDURE — 999N000156 HC STATISTIC RCP CONSULT EA 30 MIN

## 2021-09-11 PROCEDURE — 93010 ELECTROCARDIOGRAM REPORT: CPT | Mod: HIP | Performed by: INTERNAL MEDICINE

## 2021-09-11 PROCEDURE — 94640 AIRWAY INHALATION TREATMENT: CPT | Mod: 76

## 2021-09-11 PROCEDURE — 83880 ASSAY OF NATRIURETIC PEPTIDE: CPT | Performed by: INTERNAL MEDICINE

## 2021-09-11 PROCEDURE — 85384 FIBRINOGEN ACTIVITY: CPT | Performed by: INTERNAL MEDICINE

## 2021-09-11 PROCEDURE — 84443 ASSAY THYROID STIM HORMONE: CPT | Performed by: INTERNAL MEDICINE

## 2021-09-11 PROCEDURE — 99233 SBSQ HOSP IP/OBS HIGH 50: CPT | Performed by: INTERNAL MEDICINE

## 2021-09-11 PROCEDURE — 97116 GAIT TRAINING THERAPY: CPT | Mod: GP

## 2021-09-11 PROCEDURE — 82374 ASSAY BLOOD CARBON DIOXIDE: CPT | Performed by: INTERNAL MEDICINE

## 2021-09-11 PROCEDURE — 84145 PROCALCITONIN (PCT): CPT | Performed by: INTERNAL MEDICINE

## 2021-09-11 PROCEDURE — 82040 ASSAY OF SERUM ALBUMIN: CPT | Performed by: INTERNAL MEDICINE

## 2021-09-11 PROCEDURE — 999N000157 HC STATISTIC RCP TIME EA 10 MIN

## 2021-09-11 PROCEDURE — 85379 FIBRIN DEGRADATION QUANT: CPT | Performed by: INTERNAL MEDICINE

## 2021-09-11 PROCEDURE — 250N000013 HC RX MED GY IP 250 OP 250 PS 637: Performed by: STUDENT IN AN ORGANIZED HEALTH CARE EDUCATION/TRAINING PROGRAM

## 2021-09-11 PROCEDURE — 94640 AIRWAY INHALATION TREATMENT: CPT

## 2021-09-11 PROCEDURE — 250N000013 HC RX MED GY IP 250 OP 250 PS 637: Performed by: INTERNAL MEDICINE

## 2021-09-11 RX ORDER — IPRATROPIUM BROMIDE AND ALBUTEROL SULFATE 2.5; .5 MG/3ML; MG/3ML
3 SOLUTION RESPIRATORY (INHALATION) EVERY 4 HOURS PRN
Status: DISCONTINUED | OUTPATIENT
Start: 2021-09-11 | End: 2021-09-12 | Stop reason: HOSPADM

## 2021-09-11 RX ORDER — METOPROLOL TARTRATE 25 MG/1
25 TABLET, FILM COATED ORAL 2 TIMES DAILY
Status: DISCONTINUED | OUTPATIENT
Start: 2021-09-11 | End: 2021-09-12 | Stop reason: HOSPADM

## 2021-09-11 RX ADMIN — PANTOPRAZOLE SODIUM 40 MG: 20 TABLET, DELAYED RELEASE ORAL at 06:31

## 2021-09-11 RX ADMIN — CHOLECALCIFEROL TAB 25 MCG (1000 UNIT) 1000 UNITS: 25 TAB at 10:14

## 2021-09-11 RX ADMIN — DEXAMETHASONE SODIUM PHOSPHATE 6 MG: 10 INJECTION, SOLUTION INTRAMUSCULAR; INTRAVENOUS at 10:12

## 2021-09-11 RX ADMIN — IPRATROPIUM BROMIDE AND ALBUTEROL 1 PUFF: 20; 100 SPRAY, METERED RESPIRATORY (INHALATION) at 21:33

## 2021-09-11 RX ADMIN — MONTELUKAST 10 MG: 10 TABLET ORAL at 21:31

## 2021-09-11 RX ADMIN — IPRATROPIUM BROMIDE AND ALBUTEROL SULFATE 3 ML: .5; 3 SOLUTION RESPIRATORY (INHALATION) at 07:39

## 2021-09-11 RX ADMIN — METOPROLOL TARTRATE 12.5 MG: 25 TABLET, FILM COATED ORAL at 16:01

## 2021-09-11 RX ADMIN — AZITHROMYCIN 500 MG: 250 TABLET, FILM COATED ORAL at 20:50

## 2021-09-11 RX ADMIN — ATORVASTATIN CALCIUM 20 MG: 10 TABLET, FILM COATED ORAL at 21:31

## 2021-09-11 RX ADMIN — SODIUM CHLORIDE, PRESERVATIVE FREE 50 ML: 5 INJECTION INTRAVENOUS at 18:14

## 2021-09-11 RX ADMIN — ASPIRIN 81 MG: 81 TABLET, DELAYED RELEASE ORAL at 10:11

## 2021-09-11 RX ADMIN — IPRATROPIUM BROMIDE AND ALBUTEROL SULFATE 3 ML: .5; 3 SOLUTION RESPIRATORY (INHALATION) at 13:25

## 2021-09-11 RX ADMIN — REMDESIVIR 100 MG: 100 INJECTION, POWDER, LYOPHILIZED, FOR SOLUTION INTRAVENOUS at 18:10

## 2021-09-11 RX ADMIN — ENOXAPARIN SODIUM 40 MG: 100 INJECTION SUBCUTANEOUS at 16:01

## 2021-09-11 RX ADMIN — Medication 1 MG: at 21:31

## 2021-09-11 RX ADMIN — METOPROLOL TARTRATE 12.5 MG: 25 TABLET, FILM COATED ORAL at 10:11

## 2021-09-11 RX ADMIN — CEFDINIR 300 MG: 300 CAPSULE ORAL at 10:13

## 2021-09-11 RX ADMIN — CHOLECALCIFEROL TAB 25 MCG (1000 UNIT) 1000 UNITS: 25 TAB at 10:11

## 2021-09-11 RX ADMIN — Medication 1 TABLET: at 10:13

## 2021-09-11 RX ADMIN — METOPROLOL TARTRATE 25 MG: 25 TABLET, FILM COATED ORAL at 20:50

## 2021-09-11 RX ADMIN — ACETAMINOPHEN 650 MG: 325 TABLET ORAL at 21:32

## 2021-09-11 RX ADMIN — CEFDINIR 300 MG: 300 CAPSULE ORAL at 20:50

## 2021-09-11 RX ADMIN — FUROSEMIDE 20 MG: 20 TABLET ORAL at 10:14

## 2021-09-11 RX ADMIN — FLUTICASONE FUROATE 1 PUFF: 100 POWDER RESPIRATORY (INHALATION) at 10:14

## 2021-09-11 ASSESSMENT — MIFFLIN-ST. JEOR: SCORE: 1132.13

## 2021-09-11 NOTE — PLAN OF CARE
Problem: Adult Inpatient Plan of Care  Goal: Plan of Care Review  Outcome: Improving  Problem: Gas Exchange Impaired  Goal: Optimal Gas Exchange  Outcome: Improving  Pt remained on RA throughout the shift, O2 sats in the 90's, desat with activity but able to recover, encouraging IS used and deep breathing, telemetry - NSR with PVC's, BLE edematous, encouraged elevation, potassium protocol rechecked in the morning, updated daughter Alda, continue to monitor.

## 2021-09-11 NOTE — PROGRESS NOTES
[facility] Medicine PROGRESS NOTE      Identification/Summary: Tia Brooke is a 89 year old female with a past medical history of COPD, hypertension, HLD, Carlos, paroxysmal A. fib not on anticoagulation who was admitted on 9/7/2021 for cough and shortness of breath.  Patient received [employer] & Sherif Covid vaccine, and known timing.Patient tested positive for COVID-19 and VBG showed hypoxia.  Patient needed 2 L of oxygen supplementation.  CT chest revealed no PE but positive for pneumonia and hiatal hernia.  Patient is started on remdesivir and dexamethasone, azithromycin and ceftriaxone.  Patient's conditions stable and improving.  She desaturates with activity to upper 80s.  PT OT recommending TCU.  Likely discharge tomorrow wit are without oxygen.  Gave 1 dose of IV Lasix today for lower extremity edema.  She was able to be weaned off of the oxygen most of the time but required 1 L overnight.  Cardiology reviewed her rhythm strips and felt that this artifact.  She had rapid A. fib overnight and with heart rates up to 130s.  Discussed with cardiology flor.  We will start p.o. metoprolol.  Echo with EF of 60 to 65%.     Assessment and Plan:  Acute respiratory failure with hypoxia   COVID-19 pneumonia/possible bacterial pneumonia  COPD exacerbation   History of COPD,SOB, productive cough, loss of taste and smell.  Positive COVID-19 exposure.  CBC revealed leukocytosis of 16.2.  Normal pro Darian which is exclude bacterial etiology.  CRP of 18.9.  VBG with normal pH but decreased PaO2, 19.  CT chest revealed no acute PE, positive for pneumonia and hiatal hernia.  Patient received Shreif [employer] Sherif Covid vaccine, unknown time  S/p IV ceftriaxone, azithromycin  Change to cefdinir, azithromycin  Mucomyst  -Remdesivir  -Dexamethasone  -PTA montelukast     History of A. fib with RVR  Start metoprolol 25 mg twice daily  Check TSH  Magnesium is normal  Echo with normal EF 60 to 65%.    Hypokalemia.    Resolved  Patient on Lasix 20 mg.  -Replace with potassium replacement protocol     Normocytic anemia  -Monitoring     Hiatal hernia  Incidental finding, outpatient follow-up  Chronic conditions  HTN-hydralazine as needed, if SBP>180 or DBP>110  HLD-PTA atorvastatin    Diet: Combination Diet Regular Diet Adult  DVT Prophylaxis: Lovenox  Code Status: No CPR- Pre-arrest intubation OK    Anticipated possible discharge in 1 days to home once clinical improvement milestones are met.    Interval History/Subjective:  She had rapid A. fib required p.o. medications.  She felt rapid heart rate.  Denies any other new or worsening shortness of breath, cough persists with phlegm intermittently.  No chest pain.  Updated daughter over the phone.    Physical Exam/Objective:  Vitals I/O        Vital signs:  Temp: 98  F (36.7  C) Temp src: Oral BP: (!) 142/65 (Rn Notified) Pulse: 97   Resp: 18 SpO2: 93 % O2 Device: None (Room air) Oxygen Delivery: 1 LPM Height: 152.4 cm (5') Weight: 78.6 kg (173 lb 3.2 oz)  Estimated body mass index is 33.83 kg/m  as calculated from the following:    Height as of this encounter: 1.524 m (5').    Weight as of this encounter: 78.6 kg (173 lb 3.2 oz).     I/O last 3 completed shifts:  In: 1550 [P.O.:1550]  Out: 2150 [Urine:2150]     Body mass index is 33.83 kg/m .    General Appearance:  Alert, cooperative, no distress   Head:  Normocephalic, atraumatic   Eyes:  PERRL    Throat:  mucosa; moist   Neck: No JVD, thyromegaly   Lungs:    Breathing appears comfortable    Chest Wall:  No tenderness or deformity   Heart:  irregular rate and rhythm, S1, S2 normal,no murmur   Abdomen:   Soft, non tender, non distended, bowel sounds present, no guarding or rigidity   Extremities:  pitting edema improving, no joint swelling   Skin: Skin color, texture, turgor normal, no rashes or lesions   Neurologic: Alert and oriented X 3, Moves all 4 extremities       Medications:   Personally Reviewed.    remdesivir  100 mg  Intravenous Q24H    And     sodium chloride 0.9%  50 mL Intravenous Q24H     aspirin  81 mg Oral Daily     atorvastatin  20 mg Oral At Bedtime     azithromycin  500 mg Oral Daily     calcium carbonate-vitamin D  1 tablet Oral Daily     cefdinir  300 mg Oral Q12H HALIMA     dexamethasone  6 mg Intravenous Daily     enoxaparin ANTICOAGULANT  40 mg Subcutaneous Q24H     fluticasone  1 puff Inhalation QAM     furosemide  20 mg Oral QAM     ipratropium - albuterol 0.5 mg/2.5 mg/3 mL  3 mL Nebulization TID     metoprolol tartrate  12.5 mg Oral Once     metoprolol tartrate  25 mg Oral BID     montelukast  10 mg Oral At Bedtime     pantoprazole  40 mg Oral QAM AC     sodium chloride (PF)  3 mL Intracatheter Q8H     sodium chloride (PF)  3 mL Intracatheter Q8H     Vitamin D3  1,000 Units Oral QAM       Data reviewed today: I personally reviewed all new medications, labs, imaging/diagnostics reports over the past 24 hours. Pertinent findings include  Echocardiogram     Left ventricular size, wall motion and function are normal. The ejection  fraction is 60-65%.  Normal right ventricle size and systolic function.  The left atrium is mildly dilated.  There is mild (1+) aortic regurgitation.  Labs:  Most Recent 3 CBC's:  Recent Labs   Lab Test 09/11/21  0720 09/10/21  0543 09/09/21  0726   WBC 10.2 10.1 15.1*   HGB 10.0* 9.3* 9.2*   MCV 93 95 95   * 462* 491*     Most Recent 3 BMP's:  Recent Labs   Lab Test 09/11/21  0720 09/11/21  0040 09/10/21  1531 09/10/21  0543    140  --  142   POTASSIUM 3.6 3.6 3.7 3.9   CHLORIDE 104 105  --  108*   CO2 29 25  --  26   BUN 20 21  --  18   CR 0.85 1.05  --  0.76   ANIONGAP 9 10  --  8   JUAN FRANCISCO 9.0 9.4  --  8.4*    176*  --  118     Most Recent 2 LFT's:  Recent Labs   Lab Test 09/11/21  0040 09/10/21  0543   AST 23 21   ALT 25 22   ALKPHOS 93 71   BILITOTAL 0.7 0.4     Most Recent D-dimer:  Recent Labs   Lab Test 09/11/21  0720   DD 1.28*        Ryanne Haley,  MD  Hospitalist  [facility]

## 2021-09-11 NOTE — PROGRESS NOTES
Patient receiving nebs as ordered.  Breathsounds diminished pre and post neb with no change post neb.  Pt on RA with sp02 93-95%,  RR 20, HR 67.  RT to continue to monitor and treat as ordered.

## 2021-09-11 NOTE — PROGRESS NOTES
Care Management Follow Up    Length of Stay (days): 4    Expected Discharge Date: 09/12/2021     Concerns to be Addressed:     Medical Progression-Afib over night, oxygen needs.   Patient plan of care discussed at interdisciplinary rounds: Yes    Anticipated Discharge Disposition:  Home with possible home care services     Anticipated Discharge Services:  Therapy recommending TCU, patient declines. Considering home care services  Anticipated Discharge DME:  To be determined    Patient/family educated on Medicare website which has current facility and service quality ratings:  Yes  Education Provided on the Discharge Plan:  Per Team  Patient/Family in Agreement with the Plan:  Yes, plan pending    Referrals Placed by CM/SW:  None at this time  Private pay costs discussed: Not applicable    Additional Information:  Spoke with patients son, [phone], and he is agreeable to plan for patient to discharge home wit him. Per patients son his works shift is from 12-4 and feels they can manage when he is not home. Patients son directed writer to speak with patients Daughter Genesis in regards to home care and transport. Spoke with Genesis in regards to patient declining TCU and discussed possible home care services instead. Genesis will discuss with the patient and call writer back at [phone]. Genesis does not feel patient will discharge today due to over night events. Family to transport at discharge. Care manager to follow.       Jocelin Ross RN

## 2021-09-11 NOTE — PLAN OF CARE
Problem: Adult Inpatient Plan of Care  Goal: Plan of Care Review  Outcome: Improving     Problem: Gas Exchange Impaired  Goal: Optimal Gas Exchange  Outcome: Improving     Patient has denied pain tonight when asked  Had c/o urinary incontinence with coughing at the start of shift-Purewick initiated  Continues on telemetry and has been a-fib with/without PVC's  HR irregular mostly between   IV saline locked and flushed without complication  On K+ protocol with recheck scheduled for this morning  On continuous pulse oximetry with oxygen at 1LPM

## 2021-09-11 NOTE — PROGRESS NOTES
Informed of HR - poorly controlled     Tele with HR of 120-130s --- appears Afib    Plans - check labs, and will give PRN rate controllers.     120A - Afib on EKG - if HR persistently >120, will give PRN controllers (cardizem, since she has history of asthma). So far, HR goes down to low 90s. And she appeared comfortable.

## 2021-09-11 NOTE — PLAN OF CARE
Problem: Adult Inpatient Plan of Care  Goal: Absence of Hospital-Acquired Illness or Injury  Intervention: Identify and Manage Fall Risk  Recent Flowsheet Documentation  Taken 9/11/2021 1544 by Hermelinda Craig, RN  Safety Promotion/Fall Prevention: clutter free environment maintained  Taken 9/11/2021 1000 by Hermelinda Craig, RN  Safety Promotion/Fall Prevention: clutter free environment maintained

## 2021-09-11 NOTE — PROVIDER NOTIFICATION
Pt admitted with COVID/ ARDS.  Hx COPD, HTN, HLD, Afib, hiatal hernia.  Pt is on RA, o2 sats 94%, BS diminished.  Pt states she does not use nebulizers @ home and would llike to continue with MDI.  Will call if neb is needed.    Will schedule Combivent TID, Alb MDI PRN, Duoneb PRN @ this time.  Will RE-EVAL if needed.       09/11/21 1432   RCAT Assessment   Reason for Assessment COPD  (COVID +)   Pulmonary Status 3   Surgical Status 0   Chest X-ray 3   Respiratory Pattern 0   Mental Status 0   Breath Sounds 2   Cough Effectiveness 1   Level of Activity 1   O2 Required for SpO2>=92% 0   Acuity Level (points) 10   Acuity Level  4   Clinical Indications/Symptoms   Aerosol Therapy Home regimen;RCAT protocol   Broncho-pulmonary Hygiene History of mucous producing disease   Volume Expansion Prevent atelectasis   Aerosol Therapy Plan   Aerosol Treatment Frequency Acuity Level 4: PRN [id number] wheezing   Volume Expansion Plan   Volume Expansion Treatment Incentive Spirometer   Breath Sounds   Breath Sounds All Fields   All Lung Fields Breath Sounds diminished

## 2021-09-12 VITALS
TEMPERATURE: 97.6 F | WEIGHT: 173.2 LBS | BODY MASS INDEX: 34 KG/M2 | RESPIRATION RATE: 18 BRPM | HEART RATE: 89 BPM | SYSTOLIC BLOOD PRESSURE: 133 MMHG | DIASTOLIC BLOOD PRESSURE: 59 MMHG | OXYGEN SATURATION: 94 % | HEIGHT: 60 IN

## 2021-09-12 LAB
POTASSIUM BLD-SCNC: 3.9 MMOL/L (ref 3.5–5)
TROPONIN I SERPL-MCNC: 0.03 NG/ML (ref 0–0.29)

## 2021-09-12 PROCEDURE — 250N000011 HC RX IP 250 OP 636: Performed by: INTERNAL MEDICINE

## 2021-09-12 PROCEDURE — 84484 ASSAY OF TROPONIN QUANT: CPT | Performed by: INTERNAL MEDICINE

## 2021-09-12 PROCEDURE — 250N000013 HC RX MED GY IP 250 OP 250 PS 637: Performed by: FAMILY MEDICINE

## 2021-09-12 PROCEDURE — 84132 ASSAY OF SERUM POTASSIUM: CPT | Performed by: INTERNAL MEDICINE

## 2021-09-12 PROCEDURE — 250N000013 HC RX MED GY IP 250 OP 250 PS 637: Performed by: INTERNAL MEDICINE

## 2021-09-12 PROCEDURE — 36415 COLL VENOUS BLD VENIPUNCTURE: CPT | Performed by: INTERNAL MEDICINE

## 2021-09-12 PROCEDURE — 99239 HOSP IP/OBS DSCHRG MGMT >30: CPT | Performed by: INTERNAL MEDICINE

## 2021-09-12 RX ORDER — CEFDINIR 300 MG/1
300 CAPSULE ORAL 2 TIMES DAILY
Qty: 6 CAPSULE | Refills: 0 | Status: SHIPPED | OUTPATIENT
Start: 2021-09-12 | End: 2021-09-15

## 2021-09-12 RX ORDER — METOPROLOL TARTRATE 25 MG/1
25 TABLET, FILM COATED ORAL 2 TIMES DAILY
Qty: 60 TABLET | Refills: 0 | Status: SHIPPED | OUTPATIENT
Start: 2021-09-12

## 2021-09-12 RX ORDER — OMEPRAZOLE 20 MG/1
20 TABLET, DELAYED RELEASE ORAL DAILY
Qty: 6 TABLET | Refills: 0 | Status: SHIPPED | OUTPATIENT
Start: 2021-09-12 | End: 2023-04-27

## 2021-09-12 RX ORDER — DEXAMETHASONE 2 MG/1
2 TABLET ORAL 2 TIMES DAILY WITH MEALS
Qty: 7 TABLET | Refills: 0 | Status: SHIPPED | OUTPATIENT
Start: 2021-09-12 | End: 2023-04-27

## 2021-09-12 RX ADMIN — FLUTICASONE FUROATE 1 PUFF: 100 POWDER RESPIRATORY (INHALATION) at 08:17

## 2021-09-12 RX ADMIN — CHOLECALCIFEROL TAB 25 MCG (1000 UNIT) 1000 UNITS: 25 TAB at 08:12

## 2021-09-12 RX ADMIN — METOPROLOL TARTRATE 25 MG: 25 TABLET, FILM COATED ORAL at 08:13

## 2021-09-12 RX ADMIN — IPRATROPIUM BROMIDE AND ALBUTEROL 1 PUFF: 20; 100 SPRAY, METERED RESPIRATORY (INHALATION) at 08:18

## 2021-09-12 RX ADMIN — DEXAMETHASONE SODIUM PHOSPHATE 6 MG: 10 INJECTION, SOLUTION INTRAMUSCULAR; INTRAVENOUS at 08:12

## 2021-09-12 RX ADMIN — ASPIRIN 81 MG: 81 TABLET, DELAYED RELEASE ORAL at 08:12

## 2021-09-12 RX ADMIN — Medication 1 TABLET: at 08:12

## 2021-09-12 RX ADMIN — PANTOPRAZOLE SODIUM 40 MG: 20 TABLET, DELAYED RELEASE ORAL at 08:12

## 2021-09-12 RX ADMIN — FUROSEMIDE 20 MG: 20 TABLET ORAL at 08:12

## 2021-09-12 RX ADMIN — CEFDINIR 300 MG: 300 CAPSULE ORAL at 08:12

## 2021-09-12 NOTE — PLAN OF CARE
Oriented by four overnight. Pt o2 sats 93-95 on RA. On continuous pulse ox. Denies pain. Tele Afib. VSS. Bed alarms active. Assist of one/SBA for ambulation. K replacement protocol. Expected to discharge today.     Problem: Gas Exchange Impaired  Goal: Optimal Gas Exchange  Outcome: Improving

## 2021-09-12 NOTE — DISCHARGE SUMMARY
[facility] MEDICINE  DISCHARGE SUMMARY     Primary Care Physician: Sunil Brown  Admission Date: 9/7/2021   Discharge Provider: Ryanne Haley MD Discharge Date: 9/12/2021   Diet: Orders Placed This Encounter      Combination Diet Regular Diet Adult      Diet      Code Status: No CPR- Pre-arrest intubation OK   Activity: activity as tolerated   [facility]      Condition at Discharge: Stable      REASON FOR PRESENTATION(See Admission Note for Details)   Cough, shortness of breath    PRINCIPAL & ACTIVE DISCHARGE DIAGNOSES     Active Problems:  Acute respiratory failure with hypoxia (H)  COVID-19 pneumonia  Possible community-acquired pneumonia with leukocytosis  COPD exacerbation  Hiatal hernia.  History of A. fib with RVR  Hypokalemia replaced.    SIGNIFICANT FINDINGS (Imaging, labs):   CT PE run  1.  No acute pulmonary embolism.   2.  Commonly reported imaging features of (COVID-19) pneumonia are present. Organizing pneumonia, as can be seen in the setting of drug toxicity and connective tissue disease, can cause a similar imaging pattern.   3.  Retained airway material in the lower lobes and right middle lobe greatest left lower lobe.   4.  Moderate hiatal hernia.     CRP on admission 18.9 at discharge 3.7  WBC on admission 16.2 at discharge 10.2    Echocardiogram 9/10/2021  Left ventricular size, wall motion and function are normal. The ejection   fraction is 60-65%.   Normal right ventricle size and systolic function.   The left atrium is mildly dilated.   There is mild (1+) aortic regurgitation.     PENDING LABS     PROCEDURES ( this hospitalization only)      RECOMMENDATION FOR F/U VISIT       DISPOSITION     Home    SUMMARY OF HOSPITAL COURSE:    89 year old female with a past medical history of COPD, hypertension, HLD,  paroxysmal A. fib not on anticoagulation who was admitted on 9/7/2021 for cough and shortness of breath.  Patient received [employer] Covid  vaccine. Patient tested positive for COVID-19. Patient needed 2 L of oxygen supplementation.  CT chest revealed no PE but positive for pneumonia and hiatal hernia.  She was treated with remdesivir and dexamethasone, azithromycin and ceftriaxone.  She had leukocytosis subsequently resolved with antibiotics.   She was weaned off of oxygen at discharge. She had rapid A. fib overnight and with heart rates up to 130s.  Discussed with cardiology flor. Started on metoprolol with improvement in heart rate.  Echo with EF of 60 to 65%.  She is discharging on steroid taper, cefdinir, apixaban for DVT prevention with COVID-19 infection, atrial fibrillation.  She will follow up with A. fib clinic as outpatient.    Discharge Medications with Med changes:        Review of your medicines      START taking      Dose / Directions   apixaban ANTICOAGULANT 2.5 MG tablet  Commonly known as: ELIQUIS      Dose: 2.5 mg  Take 1 tablet (2.5 mg) by mouth 2 times daily for 21 days  Quantity: 42 tablet  Refills: 0     cefdinir 300 MG capsule  Commonly known as: OMNICEF      Dose: 300 mg  Take 1 capsule (300 mg) by mouth 2 times daily for 3 days  Quantity: 6 capsule  Refills: 0     dexamethasone 2 MG tablet  Commonly known as: DECADRON  Used for: Exacerbation of persistent asthma, unspecified asthma severity      Dose: 2 mg  Take 1 tablet (2 mg) by mouth 2 times daily (with meals)  Quantity: 7 tablet  Refills: 0     metoprolol tartrate 25 MG tablet  Commonly known as: LOPRESSOR  Used for: Paroxysmal atrial fibrillation (H)      Dose: 25 mg  Take 1 tablet (25 mg) by mouth 2 times daily  Quantity: 60 tablet  Refills: 0     omeprazole 20 MG EC tablet  Commonly known as: priLOSEC OTC      Dose: 20 mg  Take 1 tablet (20 mg) by mouth daily  Quantity: 6 tablet  Refills: 0        CONTINUE these medicines which have NOT CHANGED      Dose / Directions   albuterol 108 (90 Base) MCG/ACT inhaler  Commonly known as: PROAIR HFA/PROVENTIL HFA/VENTOLIN HFA       Dose: 2 puff  [ALBUTEROL (PROAIR HFA) 90 MCG/ACTUATION INHALER] Inhale 2 puffs every 4 (four) hours as needed. 2 puffs every 4-6 hours as needed  Refills: 0     ascorbic acid 500 MG Cpcr CR capsule  Commonly known as: vitamin C      Dose: 500 mg  Take 500 mg by mouth every morning  Refills: 0     aspirin 81 MG EC tablet  Commonly known as: ASA      Dose: 81 mg  [ASPIRIN 81 MG EC TABLET] Take 81 mg by mouth daily.  Refills: 0     atorvastatin 20 MG tablet  Commonly known as: LIPITOR      Dose: 20 mg  [ATORVASTATIN (LIPITOR) 20 MG TABLET] Take 20 mg by mouth at bedtime.  Refills: 0     CALCIUM-VITAMIN D PO      Dose: 1 tablet  Take 1 tablet by mouth every morning  Refills: 0     cholecalciferol 25 MCG (1000 UT) Tabs      Dose: 1,000 Units  Take 1,000 Units by mouth every morning  Refills: 0     fluticasone 100 MCG/ACT inhaler  Commonly known as: ARNUITY ELLIPTA      Dose: 1 puff  Inhale 1 puff into the lungs every morning  Refills: 0     furosemide 20 MG tablet  Commonly known as: LASIX  Indication: Leg swelling      Dose: 20 mg  Take 20 mg by mouth every morning  Refills: 0     GLUCOSAMINE 1500 COMPLEX PO      Dose: 1 tablet  Take 1 tablet by mouth every morning  Refills: 0     ipratropium - albuterol 0.5 mg/2.5 mg/3 mL 0.5-2.5 (3) MG/3ML neb solution  Commonly known as: DUONEB  Used for: Exacerbation of persistent asthma, unspecified asthma severity      Dose: 3 mL  [IPRATROPIUM-ALBUTEROL (DUO-NEB) 0.5-2.5 MG/3 ML NEBULIZER] Inhale 3 mL every 6 (six) hours as needed.  Quantity: 40 vial  Refills: 1     montelukast 10 MG tablet  Commonly known as: SINGULAIR      Dose: 10 mg  [MONTELUKAST (SINGULAIR) 10 MG TABLET] Take 10 mg by mouth bedtime.  Refills: 0     omega-3 fatty acids 1200 MG capsule      Dose: 1 capsule  Take 1 capsule by mouth every morning  Refills: 0     UNABLE TO FIND      Dose: 2 tablet  Take 2 tablets by mouth every morning MEDICATION NAME: Focus eye vitamins  Refills: 0        STOP taking     ibuprofen 200 MG tablet  Commonly known as: ADVIL/MOTRIN              Where to get your medicines      These [place] [address] [place] [address] [address] [place] [address]  [address] [place] [address]    Phone: [phone]     apixaban ANTICOAGULANT 2.5 MG tablet    cefdinir 300 MG capsule    dexamethasone 2 MG tablet    metoprolol tartrate 25 MG tablet    omeprazole 20 MG EC tablet              Rationale for medication changes:    Cefdinir for pneumonia  Dexamethasone taper for COPD exacerbation  Omeprazole for GI prophylaxis  Apixaban for DVT prevention with COVID-19 infection, atrial fibrillation        Consults         Immunizations given this encounter         Discharge Procedure Orders   COVID-19 GetWell Loop Referral   Standing Status: Future   Referral Priority: Routine Referral Type: Consultation   Number of Visits Requested: 1     Reason for your hospital stay   Order Comments: COVID 19 pneumonia, COPD flare up rapid A.fib     Contact provider   Order Comments: Contact your primary care provider if If increased trouble breathing, new arm/leg swelling, dizziness/passing out, falls, bleeding that doesn't stop, or uncontrolled pain.     Discharge - Quarantine/Isolation Instruction   Order Comments: Date of symptom onset:     Date of first positive test:  9/7/21  If you tested positive COVID-19 and show symptoms (fever, cough, body aches or trouble breathing):        Stay home and away from others (self-isolate) until:        At least 10 days have passed since your symptoms started. AND...        You've had no fever-and no medicine that reduces fever for 1 full day (24 hours). AND...         Your other symptoms have resolved (gotten better).  If you tested positive for COVID-19 but don't show symptoms:       Stay home and away from others (self-isolate) until at least 10 days have passed since the date of your first positive COVID-19 test.     Follow-up and  recommended labs and tests    Order Comments: Follow up with primary care provider, Sunil Brown, within 7 days for hospital follow- up.  No follow up labs or test are needed.    Follow up with [facility] in 2 weeks     Activity   Order Comments: Your activity upon discharge: activity as tolerated     Order Specific Question Answer Comments   Is discharge order? Yes      Diet   Order Comments: Follow this diet upon discharge: Orders Placed This Encounter      Combination Diet Regular Diet Adult     Order Specific Question Answer Comments   Is discharge order? Yes      Examination     Vital Signs in last 24 hours:   Vital signs:  Temp: 97.5  F (36.4  C) Temp src: Oral BP: 133/65 Pulse: 77   Resp: 18 SpO2: 95 % O2 Device: None (Room air) Oxygen Delivery: 1 LPM Height: 152.4 cm (5') Weight: 78.6 kg (173 lb 3.2 oz)  Estimated body mass index is 33.83 kg/m  as calculated from the following:    Height as of this encounter: 1.524 m (5').    Weight as of this encounter: 78.6 kg (173 lb 3.2 oz).       Pertinent positives on exam:  Lungs: Breathing appears comfortable    Please see EMR for more detailed significant labs, imaging, consultant notes etc.  Total time spent on discharge: > 35 minutes    Ryanne Haley MD   [facility] Hospitalist Service: Ph:[phone]    CC:Sunil Brown

## 2021-09-12 NOTE — PLAN OF CARE
Discharge instructions, medications, restrictions and follow up appointment discussed with patient prior to leaving.   Family to transport home.

## 2021-09-12 NOTE — PROGRESS NOTES
Care Management Discharge Note    Discharge Date: 09/12/2021  Expected Time of Departure: 11:00    Discharge Disposition: Home    Discharge Services: None    Discharge DME: None    Discharge Transportation: family or friend will provide    Private pay costs discussed: Not applicable    PAS Confirmation Code:  (Not needed)  Patient/family educated on Medicare website which has current facility and service quality ratings: other (see comments) (Not needed)    Education Provided on the Discharge Plan:    Persons Notified of Discharge Plans: Lissette Hurtado, patient, Floor nurse  Patient/Family in Agreement with the Plan: yes    Handoff Referral Completed: No    Additional Information:  Plan to discharge home today. Spoke with patients lissette Hurtado over the phone and she is in agreement with plan. Patient declines TCU and home care services at this time. Encouraged patients lissette Hurtado to follow up with primary care provider post discharge if they determine home care services are needed. Left message updating lissette Hurtado that patient will be ready for  at 11 Am, provided call back extension [phone] if needed. Plan for patient to discharge home with son, whom she lives with, and he will assist as needed.         Jocelin Ross RN

## 2021-09-13 LAB
ATRIAL RATE - MUSE: 100 BPM
DIASTOLIC BLOOD PRESSURE - MUSE: NORMAL MMHG
INTERPRETATION ECG - MUSE: NORMAL
P AXIS - MUSE: NORMAL DEGREES
PR INTERVAL - MUSE: NORMAL MS
QRS DURATION - MUSE: 82 MS
QT - MUSE: 328 MS
QTC - MUSE: 455 MS
R AXIS - MUSE: 8 DEGREES
SYSTOLIC BLOOD PRESSURE - MUSE: NORMAL MMHG
T AXIS - MUSE: -38 DEGREES
VENTRICULAR RATE- MUSE: 116 BPM

## 2021-09-24 NOTE — PLAN OF CARE
Problem: Adult Inpatient Plan of Care  Goal: Absence of Hospital-Acquired Illness or Injury  Intervention: Identify and Manage Fall Risk  Recent Flowsheet Documentation  Taken 9/10/2021 0019 by Toya Betancourt, RN  Safety Promotion/Fall Prevention:    bed alarm on    safety round/check completed    clutter free environment maintained    nonskid shoes/slippers when out of bed  Intervention: Prevent Skin Injury  Recent Flowsheet Documentation  Taken 9/10/2021 0019 by Toya Betancourt, RN  Body Position: position changed independently     Problem: Fatigue  Goal: Improved Activity Tolerance  Outcome: Improving     Pt A/O, pleasant.  Denies pain.  +2 edema to BLE.  On 1 L NC overnight. Cont pulse ox.  On K protocol. Addressed. Recheck tomorrow morning.  On tele - NSR, Sinus Antwan w/ PVCs.  Bed alarms on for safety.   Prep Survey      Responses   [facility] facility patient discharged from?  [facility]   Is LACE score < 7 ?  Yes   Emergency Room discharge w/ pulse ox?  No   Eligibility  Readm Mgmt   Discharge diagnosis  NSTEMI   Does the patient have one of the following disease processes/diagnoses(primary or secondary)?  Acute MI (STEMI,NSTEMI)   Does the patient have Home health ordered?  No   Is there a DME ordered?  No   Prep survey completed?  Yes          Aydee Diez RN

## 2021-10-16 ENCOUNTER — HEALTH MAINTENANCE LETTER (OUTPATIENT)
Age: 86
End: 2021-10-16

## 2022-05-22 ENCOUNTER — HEALTH MAINTENANCE LETTER (OUTPATIENT)
Age: 87
End: 2022-05-22

## 2022-09-25 ENCOUNTER — HEALTH MAINTENANCE LETTER (OUTPATIENT)
Age: 87
End: 2022-09-25

## 2023-02-04 ENCOUNTER — HEALTH MAINTENANCE LETTER (OUTPATIENT)
Age: 88
End: 2023-02-04

## 2023-04-27 ENCOUNTER — HOSPITAL ENCOUNTER (INPATIENT)
Facility: HOSPITAL | Age: 88
LOS: 2 days | Discharge: NURSING, REHAB OR LONG-TERM CARE | DRG: 552 | End: 2023-05-01
Attending: EMERGENCY MEDICINE | Admitting: INTERNAL MEDICINE
Payer: MEDICARE

## 2023-04-27 ENCOUNTER — APPOINTMENT (OUTPATIENT)
Dept: CT IMAGING | Facility: HOSPITAL | Age: 88
DRG: 552 | End: 2023-04-27
Attending: EMERGENCY MEDICINE
Payer: MEDICARE

## 2023-04-27 ENCOUNTER — APPOINTMENT (OUTPATIENT)
Dept: RADIOLOGY | Facility: HOSPITAL | Age: 88
DRG: 552 | End: 2023-04-27
Attending: EMERGENCY MEDICINE
Payer: MEDICARE
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
## 2023-04-27 DIAGNOSIS — S01.01XA LACERATION OF SCALP, INITIAL ENCOUNTER: ICD-10-CM

## 2023-04-27 DIAGNOSIS — S32.020A CLOSED COMPRESSION FRACTURE OF L2 LUMBAR VERTEBRA, INITIAL ENCOUNTER (H): ICD-10-CM

## 2023-04-27 LAB
ANION GAP SERPL CALCULATED.3IONS-SCNC: 9 MMOL/L (ref 7–15)
APTT PPP: 29 SECONDS (ref 22–38)
BASOPHILS # BLD AUTO: 0 10E3/UL (ref 0–0.2)
BASOPHILS NFR BLD AUTO: 0 %
BUN SERPL-MCNC: 23.8 MG/DL (ref 8–23)
CALCIUM SERPL-MCNC: 9.8 MG/DL (ref 8.2–9.6)
CHLORIDE SERPL-SCNC: 101 MMOL/L (ref 98–107)
CREAT SERPL-MCNC: 1.38 MG/DL (ref 0.51–0.95)
DEPRECATED HCO3 PLAS-SCNC: 30 MMOL/L (ref 22–29)
EOSINOPHIL # BLD AUTO: 0.4 10E3/UL (ref 0–0.7)
EOSINOPHIL NFR BLD AUTO: 3 %
ERYTHROCYTE [DISTWIDTH] IN BLOOD BY AUTOMATED COUNT: 12.9 % (ref 10–15)
GFR SERPL CREATININE-BSD FRML MDRD: 36 ML/MIN/1.73M2
GLUCOSE SERPL-MCNC: 124 MG/DL (ref 70–99)
HCT VFR BLD AUTO: 35.3 % (ref 35–47)
HGB BLD-MCNC: 11.5 G/DL (ref 11.7–15.7)
IMM GRANULOCYTES # BLD: 0.1 10E3/UL
IMM GRANULOCYTES NFR BLD: 1 %
INR PPP: 1.24 (ref 0.85–1.15)
LYMPHOCYTES # BLD AUTO: 4.4 10E3/UL (ref 0.8–5.3)
LYMPHOCYTES NFR BLD AUTO: 42 %
MCH RBC QN AUTO: 33.2 PG (ref 26.5–33)
MCHC RBC AUTO-ENTMCNC: 32.6 G/DL (ref 31.5–36.5)
MCV RBC AUTO: 102 FL (ref 78–100)
MONOCYTES # BLD AUTO: 0.8 10E3/UL (ref 0–1.3)
MONOCYTES NFR BLD AUTO: 7 %
NEUTROPHILS # BLD AUTO: 4.8 10E3/UL (ref 1.6–8.3)
NEUTROPHILS NFR BLD AUTO: 47 %
NRBC # BLD AUTO: 0 10E3/UL
NRBC BLD AUTO-RTO: 0 /100
NT-PROBNP SERPL-MCNC: 348 PG/ML (ref 0–1800)
PLATELET # BLD AUTO: 267 10E3/UL (ref 150–450)
POTASSIUM SERPL-SCNC: 4.1 MMOL/L (ref 3.4–5.3)
RBC # BLD AUTO: 3.46 10E6/UL (ref 3.8–5.2)
SODIUM SERPL-SCNC: 140 MMOL/L (ref 136–145)
TROPONIN T SERPL HS-MCNC: 40 NG/L
TROPONIN T SERPL HS-MCNC: 41 NG/L
WBC # BLD AUTO: 10.4 10E3/UL (ref 4–11)

## 2023-04-27 PROCEDURE — 84484 ASSAY OF TROPONIN QUANT: CPT | Performed by: EMERGENCY MEDICINE

## 2023-04-27 PROCEDURE — 73522 X-RAY EXAM HIPS BI 3-4 VIEWS: CPT

## 2023-04-27 PROCEDURE — 250N000011 HC RX IP 250 OP 636: Performed by: EMERGENCY MEDICINE

## 2023-04-27 PROCEDURE — 85025 COMPLETE CBC W/AUTO DIFF WBC: CPT | Performed by: EMERGENCY MEDICINE

## 2023-04-27 PROCEDURE — 250N000013 HC RX MED GY IP 250 OP 250 PS 637: Performed by: EMERGENCY MEDICINE

## 2023-04-27 PROCEDURE — 85610 PROTHROMBIN TIME: CPT | Performed by: EMERGENCY MEDICINE

## 2023-04-27 PROCEDURE — 85730 THROMBOPLASTIN TIME PARTIAL: CPT | Performed by: EMERGENCY MEDICINE

## 2023-04-27 PROCEDURE — 72131 CT LUMBAR SPINE W/O DYE: CPT | Mod: MA

## 2023-04-27 PROCEDURE — 0HQ0XZZ REPAIR SCALP SKIN, EXTERNAL APPROACH: ICD-10-PCS | Performed by: EMERGENCY MEDICINE

## 2023-04-27 PROCEDURE — 99223 1ST HOSP IP/OBS HIGH 75: CPT | Performed by: INTERNAL MEDICINE

## 2023-04-27 PROCEDURE — 93005 ELECTROCARDIOGRAM TRACING: CPT | Performed by: EMERGENCY MEDICINE

## 2023-04-27 PROCEDURE — 90714 TD VACC NO PRESV 7 YRS+ IM: CPT | Performed by: EMERGENCY MEDICINE

## 2023-04-27 PROCEDURE — 250N000013 HC RX MED GY IP 250 OP 250 PS 637: Performed by: INTERNAL MEDICINE

## 2023-04-27 PROCEDURE — 12001 RPR S/N/AX/GEN/TRNK 2.5CM/<: CPT

## 2023-04-27 PROCEDURE — 80048 BASIC METABOLIC PNL TOTAL CA: CPT | Performed by: EMERGENCY MEDICINE

## 2023-04-27 PROCEDURE — 90471 IMMUNIZATION ADMIN: CPT | Performed by: EMERGENCY MEDICINE

## 2023-04-27 PROCEDURE — 70450 CT HEAD/BRAIN W/O DYE: CPT | Mod: MA

## 2023-04-27 PROCEDURE — G0378 HOSPITAL OBSERVATION PER HR: HCPCS

## 2023-04-27 PROCEDURE — 83880 ASSAY OF NATRIURETIC PEPTIDE: CPT | Performed by: EMERGENCY MEDICINE

## 2023-04-27 PROCEDURE — 99285 EMERGENCY DEPT VISIT HI MDM: CPT | Mod: 25

## 2023-04-27 PROCEDURE — 36415 COLL VENOUS BLD VENIPUNCTURE: CPT | Performed by: EMERGENCY MEDICINE

## 2023-04-27 RX ORDER — ONDANSETRON 2 MG/ML
4 INJECTION INTRAMUSCULAR; INTRAVENOUS EVERY 6 HOURS PRN
Status: DISCONTINUED | OUTPATIENT
Start: 2023-04-27 | End: 2023-05-01 | Stop reason: HOSPADM

## 2023-04-27 RX ORDER — HYDROCODONE BITARTRATE AND ACETAMINOPHEN 5; 325 MG/1; MG/1
1 TABLET ORAL EVERY 4 HOURS PRN
Status: DISCONTINUED | OUTPATIENT
Start: 2023-04-27 | End: 2023-04-28

## 2023-04-27 RX ORDER — AMOXICILLIN 250 MG
2 CAPSULE ORAL 2 TIMES DAILY
Status: DISCONTINUED | OUTPATIENT
Start: 2023-04-27 | End: 2023-05-01 | Stop reason: HOSPADM

## 2023-04-27 RX ORDER — LIDOCAINE 40 MG/G
CREAM TOPICAL
Status: DISCONTINUED | OUTPATIENT
Start: 2023-04-27 | End: 2023-05-01 | Stop reason: HOSPADM

## 2023-04-27 RX ORDER — VITS A,C,E/LUTEIN/MINERALS 300MCG-200
1 TABLET ORAL DAILY
COMMUNITY
End: 2023-05-04

## 2023-04-27 RX ORDER — ACETAMINOPHEN 325 MG/1
975 TABLET ORAL EVERY 8 HOURS
Status: DISCONTINUED | OUTPATIENT
Start: 2023-04-27 | End: 2023-04-27

## 2023-04-27 RX ORDER — TRAZODONE HYDROCHLORIDE 50 MG/1
50 TABLET, FILM COATED ORAL AT BEDTIME
COMMUNITY

## 2023-04-27 RX ORDER — HYDROCODONE BITARTRATE AND ACETAMINOPHEN 5; 325 MG/1; MG/1
1 TABLET ORAL EVERY 6 HOURS PRN
Qty: 10 TABLET | Refills: 0 | Status: SHIPPED | OUTPATIENT
Start: 2023-04-27 | End: 2023-04-30

## 2023-04-27 RX ORDER — GABAPENTIN 100 MG/1
100 CAPSULE ORAL 3 TIMES DAILY
COMMUNITY

## 2023-04-27 RX ORDER — ONDANSETRON 4 MG/1
4 TABLET, ORALLY DISINTEGRATING ORAL EVERY 6 HOURS PRN
Status: DISCONTINUED | OUTPATIENT
Start: 2023-04-27 | End: 2023-05-01 | Stop reason: HOSPADM

## 2023-04-27 RX ORDER — HYDROCODONE BITARTRATE AND ACETAMINOPHEN 5; 325 MG/1; MG/1
1 TABLET ORAL ONCE
Status: COMPLETED | OUTPATIENT
Start: 2023-04-27 | End: 2023-04-27

## 2023-04-27 RX ORDER — ACETAMINOPHEN 325 MG/1
650 TABLET ORAL EVERY 4 HOURS PRN
Status: DISCONTINUED | OUTPATIENT
Start: 2023-04-27 | End: 2023-04-28

## 2023-04-27 RX ORDER — AMOXICILLIN 250 MG
1 CAPSULE ORAL 2 TIMES DAILY
Status: DISCONTINUED | OUTPATIENT
Start: 2023-04-27 | End: 2023-05-01 | Stop reason: HOSPADM

## 2023-04-27 RX ADMIN — HYDROCODONE BITARTRATE AND ACETAMINOPHEN 1 TABLET: 5; 325 TABLET ORAL at 19:15

## 2023-04-27 RX ADMIN — CLOSTRIDIUM TETANI TOXOID ANTIGEN (FORMALDEHYDE INACTIVATED) AND CORYNEBACTERIUM DIPHTHERIAE TOXOID ANTIGEN (FORMALDEHYDE INACTIVATED) 0.5 ML: 5; 2 INJECTION, SUSPENSION INTRAMUSCULAR at 17:58

## 2023-04-27 RX ADMIN — Medication 1 MG: at 22:31

## 2023-04-27 RX ADMIN — ACETAMINOPHEN 650 MG: 325 TABLET ORAL at 22:31

## 2023-04-27 ASSESSMENT — ENCOUNTER SYMPTOMS
BACK PAIN: 1
WOUND: 1
NECK PAIN: 0
HEADACHES: 1

## 2023-04-27 ASSESSMENT — ACTIVITIES OF DAILY LIVING (ADL)
ADLS_ACUITY_SCORE: 35

## 2023-04-27 NOTE — ED NOTES
Bed: [facility]  Expected date:   Expected time:   Means of arrival:   Comments:  [place] - 89 y F fall hit head +thinners

## 2023-04-27 NOTE — DISCHARGE INSTRUCTIONS
Ice off-and-on to sore areas can help with pain.  Over-the-counter Tylenol or ibuprofen as tolerated every 6 hours can help with pain.  1 Norco every 6 hours instead only if needed for stronger pain.  Do not drive with this.  Staples can be removed at your doctor's office in 10 days.  See your doctor if problems or concerns.    Activity:   No bending, twisting or lifting greater than 5-10 pounds while healing     WEARING THE LUMBAR BRACE:    * Wear the brace when out of bed or sitting upright in bed for comfort. Not required.     Please call  if you have questions about your spine fractures or your back pain worsens.

## 2023-04-27 NOTE — ED TRIAGE NOTES
Patient arrives via [facility] after mechanical fall on asphalt today. Patient struck her head, no LOC, on Eliquis. A&O x4. Total 75 mcg given by medics    Trauma alert.

## 2023-04-27 NOTE — ED PROVIDER NOTES
EMERGENCY DEPARTMENT ENCOUNTER      NAME: Tia Brooke  AGE: 89 year old female  YOB: 1932  MRN: [id number]  EVALUATION DATE & TIME: 2023  2:55 PM    PCP: Sunil Brown    ED PROVIDER: Aroldo Dwyer M.D.      Chief Complaint   Patient presents with     Fall     Head Laceration         FINAL IMPRESSION:  1.  Acute fall.  2.  Acute 3 cm scalp laceration.  3.  Acute superior endplate compression fracture of L2.  4.  Progression of previous L4 compression fracture.  5.  Transient hypotension.    ED COURSE & MEDICAL DECISION MAKIN:59 PM I met with the patient to gather history and to perform my initial exam. We discussed plans for the ED course, including diagnostic testing and treatment. PPE worn: cloth mask..  Patient had a fall getting out of the daughter's car and fell on the blacktop.  Patient complaining of occipital headache and there is blood in the occipital scalp with probable underlying laceration.  Patient does not know when her last tetanus shot was.  Patient is on Eliquis chronically for atrial fibrillation.  Patient denies any neurological deficits or other complaints.  She notes chronic low back pain and no exacerbation from baseline.  5:21 PM.  Laceration repair as described below.  6:48 PM.  Room air oxygenation 94%.  Patient will be discharged home.  They are in agreement with the plan.  Pain medication given.  7:13 PM.  Patient not complaining at this time of low back pain and bilateral hip pain.  Earlier she told me pain in these areas were identical to her chronic all the time pain.  We have ordered imaging of the hips and low back.  8:54 PM.  X-ray of the hips shows no acute findings.  CAT scan of the lumbar spine however shows interval progression of a previous L4 compression fracture compared to films of 10 years ago.  There is an acute superior endplate compression fracture of L2.  Patient given 1 Norco and again desaturates down to the 88 to 89% on room  air.  Better on oxygen by nasal cannula.  After discussion with family it was felt patient should be admitted at least overnight for observation and placement.  9:12 PM.  Hospitalist in agreement with [facility] telemetry observation admission with an ultimate aim of transitional care unit placement.      Critical care time exclusive of procedures: 30 minutes.      Pertinent Labs & Imaging studies reviewed. (See chart for details)    89 year old female presents to the Emergency Department for evaluation of fall and head laceration.     At the conclusion of the encounter I discussed the results of all of the tests and the disposition. The questions were answered. The patient or family acknowledged understanding and was agreeable with the care plan.            Medical Decision Making    History:    Supplemental history from: EMS.    External Record(s) reviewed: Inpatient and outpatient computer records reviewed.    Work Up:    Chart documentation includes differential considered and any EKGs or imaging independently interpreted by provider, where specified.  Differential diagnosis includes laceration, concussion, cranial contusion, bleed or fracture, etc.    In additional to work up documented, I considered the following work up: Documented in chart, if applicable.    External consultation:    Discussion of management with another provider: Documented in chart, if applicable    Complicating factors:    Care impacted by chronic illness: Anticoagulated State, Cancer/Chemotherapy, Chronic Kidney Disease, Chronic Lung Disease, Chronic Pain, Heart Disease and Hyperlipidemia    Care affected by social determinants of health: N/A    Disposition considerations: Depending on results, patient will probably be discharged home.      MEDICATIONS GIVEN IN THE EMERGENCY:  Medications   Td (tetanus & diphtheria toxoids) -  adult formulation - for ages 7 years and older (has no administration in time range)       NEW PRESCRIPTIONS  STARTED AT TODAY'S ER VISIT  New Prescriptions    No medications on file     =================================================================    HPI    Patient information was obtained from: Patient and Nurse report  Use of : N/A     Tia Brooke is a 89 year old female with a pertinent history of falls, atrial fibrillation, COPD, s/p right total hip arthroplasty, osteoarthritis, and osteopenia who presents to this ED for evaluation of a fall and head laceration.    The patient had fallen today on to black top (4/27) after she had gotten out of her daughter's car. She sustained a laceration to her occipital scalp and endorsed pain near the area. She also endorsed lower back pain, but this is chronic.    Per nurse report, the patient's O2 saturation was 87% on room air.     She denied any syncope or neck pain.She does not identify any waxing or waning symptoms otherwise, exacerbating or alleviating features,associated symptoms except as mentioned. She denies any pain related complaints. Patient reported that she is on blood thinners (Eliquis). Patient's last tetanus shot was in 2016.    REVIEW OF SYSTEMS   Review of Systems   Musculoskeletal: Positive for back pain (lower). Negative for neck pain.   Skin: Positive for wound (  Positive for occiptal scalp laceration.).   Neurological: Positive for headaches (occipital). Negative for syncope.   All other systems reviewed and are negative.       PAST MEDICAL HISTORY:  No past medical history on file.  Chronic low back pain, hypertension, COPD, history of colon cancer, osteoarthritis, Maribel syndrome, atrial fibrillation, asthma.    PAST SURGICAL HISTORY:  Past Surgical History:   Procedure Laterality Date     COLECTOMY PARTIAL  11/01/2004    Sigmoid colectomy for adenocarcinoma     PHACOEMULSIFICATION CLEAR CORNEA WITH STANDARD INTRAOCULAR LENS IMPLANT Bilateral      TOTAL HIP ARTHROPLASTY Right 12/31/2009    Dr. Gurpreet Ho           CURRENT  "MEDICATIONS:    albuterol (PROAIR HFA) 90 mcg/actuation inhaler  ascorbic acid (VITAMIN C) 500 MG CPCR CR capsule  aspirin 81 MG EC tablet  atorvastatin (LIPITOR) 20 MG tablet  CALCIUM CARBONATE/VITAMIN D3 (CALCIUM 600 + D,3, ORAL)  cholecalciferol, vitamin D3, 1,000 unit tablet  dexamethasone (DECADRON) 2 MG tablet  fluticasone (ARNUITY ELLIPTA) 100 MCG/ACT inhaler  furosemide (LASIX) 20 MG tablet  GLUC DELGADO/CHONDRO DELGADO A/VIT C/MN (GLUCOSAMINE 1500 COMPLEX ORAL)  ipratropium-albuterol (DUO-NEB) 0.5-2.5 mg/3 mL nebulizer  metoprolol tartrate (LOPRESSOR) 25 MG tablet  montelukast (SINGULAIR) 10 mg tablet  omega-3 fatty acids-fish oil (FISH OIL) 360-1,200 mg cap  omeprazole (PRILOSEC OTC) 20 MG EC tablet  UNABLE TO FIND        ALLERGIES:  No Known Allergies    FAMILY HISTORY:  Family History   Problem Relation Age of Onset     Leukemia Mother      Heart Failure Mother          age 52 of \"enlarged heart\"     Chronic Obstructive Pulmonary Disease Father      Heart Disease Brother          age 62, unspecified heart problems     Hypertension Daughter      Hyperlipidemia Daughter      Atrial fibrillation Daughter        SOCIAL HISTORY:   Social History     Socioeconomic History     Marital status:      Number of children: 4   Tobacco Use     Smoking status: Never     Smokeless tobacco: Never   Substance and Sexual Activity     Alcohol use: Yes     Comment: Alcoholic Drinks/day: 0-1 per month     Drug use: No     Sexual activity: Never   Social History Narrative    ;   in 1970's age 44, had open heart surgery then brain aneurysm.  2 daughters (Genesis Tello and Alda Tello), 2 sons.  Granddaughter Jocelin Curry is an RN.   Occasional alcohol.  No drugs or tobacco.    VITALS:  BP (!) 192/75   Pulse 52   Temp 97.6  F (36.4  C) (Oral)   Resp 14   Ht 1.473 m (4' 10\")   Wt 71.2 kg (157 lb)   SpO2 97%   BMI 32.81 kg/m      PHYSICAL EXAM    Vital Signs:  BP (!) 192/75   Pulse 52   Temp " "97.6  F (36.4  C) (Oral)   Resp 14   Ht 1.473 m (4' 10\")   Wt 71.2 kg (157 lb)   SpO2 97%   BMI 32.81 kg/m    General:  On entering the room she is in no apparent distress.    Neck:  Neck supple with full range of motion and nontender.    Back:  Back and spine are nontender.  No costovertebral angle tenderness.    HEENT:  Oropharynx clear with moist mucous membranes.  HEENT unremarkable.  Occipital scalp and hair is bloody.  Nursing will clean this area so we can see what needs to be done there.  Minimally tender to palpation.  No palpable skull fracture.  Pulmonary:  Chest clear to auscultation without rhonchi rales or wheezing.    Cardiovascular:  Cardiac regular rate and rhythm without murmurs rubs or gallops.    Abdomen:  Abdomen soft nontender.  There is no rebound or guarding.    Muskuloskeletal:  she moves all 4 without any difficulty and has normal neurovascular exams.  Extremities without clubbing, cyanosis, or edema.  Legs and calves are nontender.    Neuro:  she is alert and oriented ×3 and moves all extremities symmetrically.    Psych:  Normal affect.    Skin:  Unremarkable and warm and dry.  Crown of the head scalp laceration.  This is more in the nature of an abrasion but there is one of the scrapes that will probably take a few staples.       LAB:  All pertinent labs reviewed and interpreted.  Labs Ordered and Resulted from Time of ED Arrival to Time of ED Departure   BASIC METABOLIC PANEL - Abnormal       Result Value    Sodium 140      Potassium 4.1      Chloride 101      Carbon Dioxide (CO2) 30 (*)     Anion Gap 9      Urea Nitrogen 23.8 (*)     Creatinine 1.38 (*)     Calcium 9.8 (*)     Glucose 124 (*)     GFR Estimate 36 (*)    TROPONIN T, HIGH SENSITIVITY - Abnormal    Troponin T, High Sensitivity 40 (*)    INR - Abnormal    INR 1.24 (*)    CBC WITH PLATELETS AND DIFFERENTIAL - Abnormal    WBC Count 10.4      RBC Count 3.46 (*)     Hemoglobin 11.5 (*)     Hematocrit 35.3       (*)  "    MCH 33.2 (*)     MCHC 32.6      RDW 12.9      Platelet Count 267      % Neutrophils 47      % Lymphocytes 42      % Monocytes 7      % Eosinophils 3      % Basophils 0      % Immature Granulocytes 1      NRBCs per 100 WBC 0      Absolute Neutrophils 4.8      Absolute Lymphocytes 4.4      Absolute Monocytes 0.8      Absolute Eosinophils 0.4      Absolute Basophils 0.0      Absolute Immature Granulocytes 0.1      Absolute NRBCs 0.0     NT PROBNP INPATIENT - Normal    N terminal Pro BNP Inpatient 348     PARTIAL THROMBOPLASTIN TIME - Normal    aPTT 29     TROPONIN T, HIGH SENSITIVITY       RADIOLOGY:  Reviewed all pertinent imaging. Please see official radiology report.  XR Hip Bilateral 1 View Each   Final Result   IMPRESSION: Right total hip arthroplasty. No fracture or dislocation. Nothing to suggest loosening. Advanced degenerative changes in the left hip.      Lumbar spine CT w/o contrast   Final Result   IMPRESSION:   1.  Acute minimally displaced fracture through the anterior superior endplate of L2 extending into a left marginal osteophyte.   2.  Compression fracture of L4 with mild-moderate approximately 30% height loss, increased since comparison MRI 06/27/2013.   3.  Diffuse osseous demineralization, which can limit CT assessment for subtle fractures.   4.  Mild lower lumbar levocurvature, grade 1 stepwise retrolisthesis at L1-L2 and L2-L3, and multilevel spondylosis including moderate-severe spinal canal stenosis at L3-L4.            Head CT w/o contrast   Final Result   IMPRESSION:   1.  No acute traumatic intracranial abnormality.    2.  Posterior parieto-occipital scalp soft tissue injury without displaced calvarial fracture.                       EKG:    Normal sinus rhythm, rate controlled.    I have independently reviewed and interpreted the EKG(s) documented above.    PROCEDURES:   Cut on the scalp, approximately 2 cm long, was numbed with 1% lidocaine with epinephrine.  The wound was then  irrigated and explored and closed with 8 interrupted staples.  Patient tolerated the procedure well.      I, Vj Sheehan , am serving as a scribe to document services personally performed by Dr. Dwyer based on my observation and the provider's statements to me. I, Aroldo Dwyer MD attest that Vj Sheehan  is acting in a scribe capacity, has observed my performance of the services and has documented them in accordance with my direction.    Aroldo Dwyer M.D.  Emergency Medicine  [facility] EMERGENCY DEPARTMENT  [address] [place] [id number]  [phone]  Dept: [phone]     Aroldo Dwyer MD  04/27/23 1848       Aroldo Dwyer MD  04/27/23 1913       Aroldo Dwyer MD  04/27/23 2055       Aroldo Dwyer MD  04/27/23 2113

## 2023-04-28 ENCOUNTER — APPOINTMENT (OUTPATIENT)
Dept: PHYSICAL THERAPY | Facility: HOSPITAL | Age: 88
DRG: 552 | End: 2023-04-28
Attending: INTERNAL MEDICINE
Payer: MEDICARE

## 2023-04-28 ENCOUNTER — APPOINTMENT (OUTPATIENT)
Dept: OCCUPATIONAL THERAPY | Facility: HOSPITAL | Age: 88
DRG: 552 | End: 2023-04-28
Attending: INTERNAL MEDICINE
Payer: MEDICARE

## 2023-04-28 LAB
ANION GAP SERPL CALCULATED.3IONS-SCNC: 7 MMOL/L (ref 7–15)
ATRIAL RATE - MUSE: 241 BPM
BUN SERPL-MCNC: 20.8 MG/DL (ref 8–23)
CALCIUM SERPL-MCNC: 8.9 MG/DL (ref 8.2–9.6)
CHLORIDE SERPL-SCNC: 105 MMOL/L (ref 98–107)
CREAT SERPL-MCNC: 0.98 MG/DL (ref 0.51–0.95)
DEPRECATED HCO3 PLAS-SCNC: 28 MMOL/L (ref 22–29)
DIASTOLIC BLOOD PRESSURE - MUSE: 75 MMHG
ERYTHROCYTE [DISTWIDTH] IN BLOOD BY AUTOMATED COUNT: 12.9 % (ref 10–15)
GFR SERPL CREATININE-BSD FRML MDRD: 54 ML/MIN/1.73M2
GLUCOSE SERPL-MCNC: 98 MG/DL (ref 70–99)
HCT VFR BLD AUTO: 30.5 % (ref 35–47)
HGB BLD-MCNC: 10 G/DL (ref 11.7–15.7)
INTERPRETATION ECG - MUSE: NORMAL
MCH RBC QN AUTO: 33.3 PG (ref 26.5–33)
MCHC RBC AUTO-ENTMCNC: 32.8 G/DL (ref 31.5–36.5)
MCV RBC AUTO: 102 FL (ref 78–100)
P AXIS - MUSE: NORMAL DEGREES
PLATELET # BLD AUTO: 216 10E3/UL (ref 150–450)
POTASSIUM SERPL-SCNC: 4.3 MMOL/L (ref 3.4–5.3)
PR INTERVAL - MUSE: NORMAL MS
QRS DURATION - MUSE: 78 MS
QT - MUSE: 468 MS
QTC - MUSE: 439 MS
R AXIS - MUSE: 7 DEGREES
RBC # BLD AUTO: 3 10E6/UL (ref 3.8–5.2)
SODIUM SERPL-SCNC: 140 MMOL/L (ref 136–145)
SYSTOLIC BLOOD PRESSURE - MUSE: 192 MMHG
T AXIS - MUSE: 59 DEGREES
VENTRICULAR RATE- MUSE: 53 BPM
WBC # BLD AUTO: 8.6 10E3/UL (ref 4–11)

## 2023-04-28 PROCEDURE — 250N000013 HC RX MED GY IP 250 OP 250 PS 637: Performed by: INTERNAL MEDICINE

## 2023-04-28 PROCEDURE — G0378 HOSPITAL OBSERVATION PER HR: HCPCS

## 2023-04-28 PROCEDURE — 97162 PT EVAL MOD COMPLEX 30 MIN: CPT | Mod: GP

## 2023-04-28 PROCEDURE — 97530 THERAPEUTIC ACTIVITIES: CPT | Mod: GP

## 2023-04-28 PROCEDURE — 99232 SBSQ HOSP IP/OBS MODERATE 35: CPT | Performed by: INTERNAL MEDICINE

## 2023-04-28 PROCEDURE — 80048 BASIC METABOLIC PNL TOTAL CA: CPT | Performed by: INTERNAL MEDICINE

## 2023-04-28 PROCEDURE — 99221 1ST HOSP IP/OBS SF/LOW 40: CPT | Performed by: PHYSICIAN ASSISTANT

## 2023-04-28 PROCEDURE — 97535 SELF CARE MNGMENT TRAINING: CPT | Mod: GO

## 2023-04-28 PROCEDURE — 85014 HEMATOCRIT: CPT | Performed by: INTERNAL MEDICINE

## 2023-04-28 PROCEDURE — 36415 COLL VENOUS BLD VENIPUNCTURE: CPT | Performed by: INTERNAL MEDICINE

## 2023-04-28 PROCEDURE — 97165 OT EVAL LOW COMPLEX 30 MIN: CPT | Mod: GO

## 2023-04-28 RX ORDER — VITAMIN B COMPLEX
25 TABLET ORAL EVERY MORNING
Status: DISCONTINUED | OUTPATIENT
Start: 2023-04-28 | End: 2023-05-01 | Stop reason: HOSPADM

## 2023-04-28 RX ORDER — ATORVASTATIN CALCIUM 10 MG/1
20 TABLET, FILM COATED ORAL AT BEDTIME
Status: DISCONTINUED | OUTPATIENT
Start: 2023-04-28 | End: 2023-05-01 | Stop reason: HOSPADM

## 2023-04-28 RX ORDER — VIT C/E/ZN/COPPR/LUTEIN/ZEAXAN 60 MG-6 MG
1 CAPSULE ORAL DAILY
Status: DISCONTINUED | OUTPATIENT
Start: 2023-04-28 | End: 2023-05-01 | Stop reason: HOSPADM

## 2023-04-28 RX ORDER — MONTELUKAST SODIUM 10 MG/1
10 TABLET ORAL AT BEDTIME
Status: DISCONTINUED | OUTPATIENT
Start: 2023-04-28 | End: 2023-05-01 | Stop reason: HOSPADM

## 2023-04-28 RX ORDER — CARBOXYMETHYLCELLULOSE SODIUM 5 MG/ML
1 SOLUTION/ DROPS OPHTHALMIC
Status: DISCONTINUED | OUTPATIENT
Start: 2023-04-28 | End: 2023-05-01 | Stop reason: HOSPADM

## 2023-04-28 RX ORDER — GABAPENTIN 100 MG/1
100 CAPSULE ORAL 3 TIMES DAILY
Status: DISCONTINUED | OUTPATIENT
Start: 2023-04-28 | End: 2023-05-01 | Stop reason: HOSPADM

## 2023-04-28 RX ORDER — FUROSEMIDE 20 MG
20 TABLET ORAL EVERY MORNING
Status: DISCONTINUED | OUTPATIENT
Start: 2023-04-28 | End: 2023-05-01 | Stop reason: HOSPADM

## 2023-04-28 RX ORDER — IPRATROPIUM BROMIDE AND ALBUTEROL SULFATE 2.5; .5 MG/3ML; MG/3ML
3 SOLUTION RESPIRATORY (INHALATION) EVERY 4 HOURS PRN
Status: DISCONTINUED | OUTPATIENT
Start: 2023-04-28 | End: 2023-05-01 | Stop reason: HOSPADM

## 2023-04-28 RX ORDER — TRAZODONE HYDROCHLORIDE 50 MG/1
50 TABLET, FILM COATED ORAL AT BEDTIME
Status: DISCONTINUED | OUTPATIENT
Start: 2023-04-28 | End: 2023-05-01 | Stop reason: HOSPADM

## 2023-04-28 RX ORDER — OXYCODONE HYDROCHLORIDE 5 MG/1
5 TABLET ORAL EVERY 4 HOURS PRN
Status: DISCONTINUED | OUTPATIENT
Start: 2023-04-28 | End: 2023-05-01 | Stop reason: HOSPADM

## 2023-04-28 RX ORDER — ACETAMINOPHEN 325 MG/1
975 TABLET ORAL EVERY 8 HOURS
Status: DISCONTINUED | OUTPATIENT
Start: 2023-04-28 | End: 2023-05-01 | Stop reason: HOSPADM

## 2023-04-28 RX ADMIN — MONTELUKAST 10 MG: 10 TABLET, FILM COATED ORAL at 21:03

## 2023-04-28 RX ADMIN — HYDROCODONE BITARTRATE AND ACETAMINOPHEN 1 TABLET: 5; 325 TABLET ORAL at 04:01

## 2023-04-28 RX ADMIN — GABAPENTIN 100 MG: 100 CAPSULE ORAL at 09:17

## 2023-04-28 RX ADMIN — Medication 25 MCG: at 09:17

## 2023-04-28 RX ADMIN — ACETAMINOPHEN 975 MG: 325 TABLET ORAL at 23:55

## 2023-04-28 RX ADMIN — Medication 1 CAPSULE: at 09:16

## 2023-04-28 RX ADMIN — SENNOSIDES AND DOCUSATE SODIUM 1 TABLET: 50; 8.6 TABLET ORAL at 09:17

## 2023-04-28 RX ADMIN — ATORVASTATIN CALCIUM 20 MG: 10 TABLET, FILM COATED ORAL at 21:04

## 2023-04-28 RX ADMIN — TRAZODONE HYDROCHLORIDE 50 MG: 50 TABLET ORAL at 21:04

## 2023-04-28 RX ADMIN — SENNOSIDES AND DOCUSATE SODIUM 1 TABLET: 50; 8.6 TABLET ORAL at 21:03

## 2023-04-28 RX ADMIN — OXYCODONE HYDROCHLORIDE 2.5 MG: 5 TABLET ORAL at 11:13

## 2023-04-28 RX ADMIN — ACETAMINOPHEN 975 MG: 325 TABLET ORAL at 09:17

## 2023-04-28 RX ADMIN — Medication 37.5 MG: at 09:22

## 2023-04-28 RX ADMIN — FLUTICASONE FUROATE 1 PUFF: 100 POWDER RESPIRATORY (INHALATION) at 09:17

## 2023-04-28 RX ADMIN — OXYCODONE HYDROCHLORIDE 2.5 MG: 5 TABLET ORAL at 21:17

## 2023-04-28 RX ADMIN — FUROSEMIDE 20 MG: 20 TABLET ORAL at 09:17

## 2023-04-28 RX ADMIN — OXYCODONE HYDROCHLORIDE 2.5 MG: 5 TABLET ORAL at 16:40

## 2023-04-28 RX ADMIN — APIXABAN 5 MG: 5 TABLET, FILM COATED ORAL at 21:03

## 2023-04-28 RX ADMIN — APIXABAN 5 MG: 5 TABLET, FILM COATED ORAL at 09:17

## 2023-04-28 RX ADMIN — GABAPENTIN 100 MG: 100 CAPSULE ORAL at 21:04

## 2023-04-28 RX ADMIN — GABAPENTIN 100 MG: 100 CAPSULE ORAL at 14:15

## 2023-04-28 RX ADMIN — CARBOXYMETHYLCELLULOSE SODIUM 1 DROP: 5 SOLUTION/ DROPS OPHTHALMIC at 11:07

## 2023-04-28 RX ADMIN — Medication 1 TABLET: at 09:16

## 2023-04-28 RX ADMIN — ACETAMINOPHEN 975 MG: 325 TABLET ORAL at 17:10

## 2023-04-28 ASSESSMENT — ACTIVITIES OF DAILY LIVING (ADL)
ADLS_ACUITY_SCORE: 35
DEPENDENT_IADLS:: TRANSPORTATION
ADLS_ACUITY_SCORE: 35
ADLS_ACUITY_SCORE: 35
ADLS_ACUITY_SCORE: 41
ADLS_ACUITY_SCORE: 41
ADLS_ACUITY_SCORE: 35
ADLS_ACUITY_SCORE: 41
ADLS_ACUITY_SCORE: 35

## 2023-04-28 NOTE — CONSULTS
[facility]    Neurosurgery Consultation     Date of Admission:  4/27/2023  Date of Consult (When I saw the patient): 04/28/23    Assessment & Plan   Tia Brooke is a 89 year old female who was admitted on 4/27/2023. I was asked to see the patient for fall with complaint of worsening back pain. CT lumbar spine with noted mild compression fracture of L2 and worsening height loss of L4 fracture of which was present in 2013.    Principal Problem:    Closed compression fracture of L2 lumbar vertebra, initial encounter (H)  Active Problems:    Fall, initial encounter    Laceration of scalp, initial encounter    Plan:   Orthotics to fit patient with LSO brace for comfort   Will obtain baseline xray after brace arrives  Okay to advance activity as tolerated prior to brace arriving  Otherwise will sign off please re-consult if indicated   Patient can follow up outpatient in 4 week with repeat xray should pain persist       I have discussed the following assessment and plan with Dr. Vanessa who is in agreement with initial plan and will follow up with further consultation recommendations.    Bethany Hanna PA-C  [facility]  O: [phone]        Code Status    No CPR- Do NOT Intubate    Reason for Consult   Reason for consult: I was asked by Dr. Carter to evaluate this patient for L2 compression fracture.    Primary Care Physician    Sunil Brown    Chief Complaint   Back pain     History is obtained from the patient and patient's daughter at bedside     History of Present Illness   Tia Brooke is a 89 year old female who presents with complaint of severe back pain after a fall while at home. Unsure of why she fell but feels like she like she may have tripped. She fell backwards hitting the back of her head and buttocks. She describes her pain as a severe sharp stabbing pain that was on a sudden onset. She denies any radicular lower extremity pain  numbness or tingling. She states that her pain drastically increases with any movement. She denies any changes in bowel or bladder habits. She denies any headaches, nausea, emesis, or visual changes. She denies any neck pain or upper extremity pain numbness tingling or weakness.  In 2013 she had a fall and noted to have L4 fracture of which was treated with custom TLSO brace of which the daughter brought in today.     Past Medical History   I have reviewed this patient's medical history and updated it with pertinent information if needed.   No past medical history on file.    Past Surgical History   I have reviewed this patient's surgical history and updated it with pertinent information if needed.  Past Surgical History:   Procedure Laterality Date     COLECTOMY PARTIAL  11/01/2004    Sigmoid colectomy for adenocarcinoma     PHACOEMULSIFICATION CLEAR CORNEA WITH STANDARD INTRAOCULAR LENS IMPLANT Bilateral      TOTAL HIP ARTHROPLASTY Right 12/31/2009    Dr. Gurpreet Ho       Prior to Admission Medications   Prior to Admission Medications   Prescriptions Last Dose Informant Patient Reported? Taking?   CALCIUM CARBONATE/VITAMIN D3 (CALCIUM 600 + D,3, ORAL) 4/27/2023 at am  Yes Yes   Sig: Take 1 tablet by mouth every morning    GLUC DELGADO/CHONDRO DELGADO A/VIT C/MN (GLUCOSAMINE 1500 COMPLEX ORAL) 4/27/2023 at am  Yes Yes   Sig: Take 1 tablet by mouth every morning    albuterol (PROAIR HFA) 90 mcg/actuation inhaler Unknown at prn  Yes Yes   Sig: [ALBUTEROL (PROAIR HFA) 90 MCG/ACTUATION INHALER] Inhale 2 puffs every 4 (four) hours as needed. 2 puffs every 4-6 hours as needed          apixaban ANTICOAGULANT (ELIQUIS) 5 MG tablet 4/27/2023 at am  Yes Yes   Sig: Take 5 mg by mouth 2 times daily   atorvastatin (LIPITOR) 20 MG tablet 4/26/2023 at hs  Yes Yes   Sig: [ATORVASTATIN (LIPITOR) 20 MG TABLET] Take 20 mg by mouth at bedtime.          cholecalciferol, vitamin D3, 1,000 unit tablet 4/27/2023 at am  Yes Yes   Sig: Take 1,000  "Units by mouth every morning    fluticasone (ARNUITY ELLIPTA) 100 MCG/ACT inhaler 2023 at am  Yes Yes   Sig: Inhale 1 puff into the lungs every morning   furosemide (LASIX) 20 MG tablet 2023 at am  Yes Yes   Sig: Take 20 mg by mouth every morning   gabapentin (NEURONTIN) 100 MG capsule 2023 at x 2  Yes Yes   Sig: Take 100 mg by mouth 3 times daily   ipratropium-albuterol (DUO-NEB) 0.5-2.5 mg/3 mL nebulizer Unknown at prn  No Yes   Sig: [IPRATROPIUM-ALBUTEROL (DUO-NEB) 0.5-2.5 MG/3 ML NEBULIZER] Inhale 3 mL every 6 (six) hours as needed.   metoprolol tartrate (LOPRESSOR) 25 MG tablet 2023 at am  No Yes   Sig: Take 1 tablet (25 mg) by mouth 2 times daily   Patient taking differently: Take 37.5 mg by mouth 2 times daily   montelukast (SINGULAIR) 10 mg tablet 2023 at hs  Yes Yes   Sig: [MONTELUKAST (SINGULAIR) 10 MG TABLET] Take 10 mg by mouth bedtime.    multivitamin (OCUVITE) TABS tablet 2023 at am  Yes Yes   Sig: Take 1 tablet by mouth daily   omega-3 fatty acids-fish oil (FISH OIL) 360-1,200 mg cap 2023 at am  Yes Yes   Sig: Take 1 capsule by mouth every morning    traZODone (DESYREL) 50 MG tablet 2023 at hs  Yes Yes   Sig: Take 50 mg by mouth At Bedtime      Facility-Administered Medications: None     Allergies   No Known Allergies    Social History   I have reviewed this patient's social history and updated it with pertinent information if needed. Tia Brooke  reports that she has never smoked. She has never used smokeless tobacco. She reports current alcohol use. She reports that she does not use drugs.    Family History   I have reviewed this patient's family history and updated it with pertinent information if needed.   Family History   Problem Relation Age of Onset     Leukemia Mother      Heart Failure Mother          age 52 of \"enlarged heart\"     Chronic Obstructive Pulmonary Disease Father      Heart Disease Brother          age 62, unspecified heart " "problems     Hypertension Daughter      Hyperlipidemia Daughter      Atrial fibrillation Daughter        Review of Systems   14 point review of systems negative with exception of HPI     Physical Exam   Temp: 97.7  F (36.5  C) Temp src: Oral BP: 134/60 Pulse: 61   Resp: 16 SpO2: 97 % O2 Device: Nasal cannula Oxygen Delivery: 2 LPM  Vital Signs with Ranges  Temp:  [97.6  F (36.4  C)-98.1  F (36.7  C)] 97.7  F (36.5  C)  Pulse:  [52-62] 61  Resp:  [11-21] 16  BP: (134-192)/(60-77) 134/60  SpO2:  [87 %-100 %] 97 %  157 lbs 0 oz     , Blood pressure 134/60, pulse 61, temperature 97.7  F (36.5  C), temperature source Oral, resp. rate 16, height 1.473 m (4' 10\"), weight 71.2 kg (157 lb), SpO2 97 %.  157 lbs 0 oz  HEENT:  Normocephalic, atraumatic.  PERRLA.  EOM s intact.   Neck:  Supple, non-tender, without lymphadenopathy.  Heart:  No peripheral edema  Lungs:  No SOB  Abdomen:  Soft, non-tender, non-distended.  Normal bowel sounds.  Skin:  Warm and dry, good capillary refill.  Extremities:  Good radial and dorsalis pedis pulses bilaterally, no edema, cyanosis or clubbing.    NEUROLOGICAL EXAMINATION:   Mental status:  Alert and Oriented x 3, speech is fluent.  Cranial nerves:  II-XII intact.   Motor:  Strength is 5/5 throughout the upper and lower extremities  Deltoids:  Right: 5/5   Left:  5/5  Biceps:  Right: 5/5   Left:  5/5  Triceps: Right: 5/5   Left:  5/5                       Wrist Extensors: Right: 5/5   Left:  5/5        Wrist Flexors:Right: 5/5   Left:  5/5             Hand : Right: 5/5   Left:  5/5         Hip Flexor: Right: 3/5   Left:  3/5 weakness secondary to pain but able to lift off of bed                  Hip Adductor:Right: 5/5   Left:  5/5               Hip Abductor: Right: 5/5   Left:  5/5              Gastroc Soleus:Right: 5/5   Left:  5/5        Tib/Ant:Right: 5/5   Left:  5/5                        EHL:Right: 5/5   Left:  5/5                     Sensation:  Intact to LT throughout   Reflexes:  " Negative Babinski.  Negative Clonus.    Coordination:  Smooth finger to nose testing.   Negative pronator drift.        Cervical examination reveals good range of motion.  No tenderness to palpation of the cervical spine or paraspinous muscles bilaterally.     Lumbar examination reveals severe tenderness of the spine and paraspinous muscles.  Hip height is symmetrical. Negative SI joint, sciatic notch or greater trochanteric tenderness to palpation bilaterally.  Straight leg raise is negative bilaterally.     Images reviewed personally   IMPRESSION:  1.  Acute minimally displaced fracture through the anterior superior endplate of L2 extending into a left marginal osteophyte.  2.  Compression fracture of L4 with mild-moderate approximately 30% height loss, increased since comparison MRI 06/27/2013.  3.  Diffuse osseous demineralization, which can limit CT assessment for subtle fractures.  4.  Mild lower lumbar levocurvature, grade 1 stepwise retrolisthesis at L1-L2 and L2-L3, and multilevel spondylosis including moderate-severe spinal canal stenosis at L3-L4.                                                                      IMPRESSION:  1.  No acute traumatic intracranial abnormality.   2.  Posterior parieto-occipital scalp soft tissue injury without displaced calvarial fracture.    Data     CBC RESULTS:   Recent Labs   Lab Test 04/28/23  0735   WBC 8.6   RBC 3.00*   HGB 10.0*   HCT 30.5*   *   MCH 33.3*   MCHC 32.8   RDW 12.9        Basic Metabolic Panel:  Lab Results   Component Value Date     04/28/2023      Lab Results   Component Value Date    POTASSIUM 4.3 04/28/2023    POTASSIUM 3.9 09/12/2021     Lab Results   Component Value Date    CHLORIDE 105 04/28/2023    CHLORIDE 104 09/11/2021     Lab Results   Component Value Date    JUAN FRANCISCO 8.9 04/28/2023     Lab Results   Component Value Date    CO2 28 04/28/2023    CO2 29 09/11/2021     Lab Results   Component Value Date    BUN 20.8 04/28/2023     BUN 20 09/11/2021     Lab Results   Component Value Date    CR 0.98 04/28/2023     Lab Results   Component Value Date    GLC 98 04/28/2023     09/11/2021     INR:  Lab Results   Component Value Date    INR 1.24 04/27/2023    INR 1.10 09/07/2021

## 2023-04-28 NOTE — PLAN OF CARE
Goal Outcome Evaluation:      Plan of Care Reviewed With: patient      Problem: Orthopaedic Fracture  Goal: Optimal Pain Control and Function  Outcome: Progressing   Scheduled tylenol and as needed oxycodone given for low back pain. Pt. Repositioned on side for comfort.   Pt. Was able to sit and stand with assist of 1 and a walker with therapy. Orthotics here to fit her for a brace at the time of transfer and will come see her up on P4.     Problem: Orthopaedic Fracture  Goal: Effective Oxygenation and Ventilation  Outcome: Progressing   Pt. On 2L of oxygen, pt. Does not use oxygen at home. Scheduled inhaler given this morning.     Report given to Alina on P4. Pts. Daughter here at the time of transfer. Pts. Belongings  and medications sent along with patient.     Abigail Benz RN

## 2023-04-28 NOTE — PROGRESS NOTES
"PRIMARY DIAGNOSIS: \"GENERIC\" NURSING  OUTPATIENT/OBSERVATION GOALS TO BE MET BEFORE DISCHARGE:  1. ADLs back to baseline: No    2. Activity and level of assistance: bedrest until neurosurgery sees    3. Pain status: Improved-controlled with oral pain medications.    4. Return to near baseline physical activity: No     Discharge Planner Nurse   Safe discharge environment identified: No  Barriers to discharge: Yes       Entered by: Abigail Benz RN 04/28/2023 4:31 PM     Please review provider order for any additional goals.   Nurse to notify provider when observation goals have been met and patient is ready for discharge.  "

## 2023-04-28 NOTE — PROGRESS NOTES
04/28/23 1510   Appointment Info   Signing Clinician's Name / Credentials (PT) Latricia Gorman DPT   Quick Adds   Quick Adds Certification   Living Environment   People in Home child(angel), adult  (Son)   Current Living Arrangements house   Home Accessibility stairs to enter home   Number of Stairs, Main Entrance 2   Stair Railings, Main Entrance railings safe and in good condition   Transportation Anticipated family or friend will provide   Self-Care   Usual Activity Tolerance good   Current Activity Tolerance poor   Equipment Currently Used at Home walker, rolling  (4WW, FWW)   General Information   Onset of Illness/Injury or Date of Surgery 04/27/23   Referring Physician Nuha Carter MD   Patient/Family Therapy Goals Statement (PT) return home   Pertinent History of Current Problem (include personal factors and/or comorbidities that impact the POC) 89 year old female who was admitted on 4/27/2023. I was asked to see the patient for fall with complaint of worsening back pain. CT lumbar spine with noted mild compression fracture of L2 and worsening height loss of L4 fracture of which was present in 2013   Existing Precautions/Restrictions (S)  spinal;brace worn when out of bed  (LSO for comfort, OK to mobilize until brace arrives per NS.)   Strength (Manual Muscle Testing)   Strength (Manual Muscle Testing) Deficits observed during functional mobility   Bed Mobility   Bed Mobility supine-sit;sit-supine   Supine-Sit [place] (Bed Mobility) moderate assist (50% patient effort);2 person assist   Sit-Supine [place] (Bed Mobility) moderate assist (50% patient effort);2 person assist   Bed Mobility Limitations decreased ability to use arms for pushing/pulling;decreased ability to use legs for bridging/pushing;impaired ability to control trunk for mobility   Impairments Contributing to Impaired Bed Mobility pain   Assistive Device (Bed Mobility) bed rails   Transfers   Transfers sit-stand transfer    Sit-Stand Transfer   Sit-Stand [place] (Transfers) moderate assist (50% patient effort);2 person assist   Assistive Device (Sit-Stand Transfers) walker, front-wheeled   Gait/Stairs (Locomotion)   Comment, (Gait/Stairs) n/a- pt unable to ambulate   Clinical Impression   Criteria for Skilled Therapeutic Intervention Yes, treatment indicated   PT Diagnosis (PT) Impaired functional mobility   Influenced by the following impairments Weakness, pain   Functional limitations due to impairments Impaired strength, transfers, gait   Clinical Presentation (PT Evaluation Complexity) Stable/Uncomplicated   Clinical Presentation Rationale Presents as diagnosed   Clinical Decision Making (Complexity) moderate complexity   Planned Therapy Interventions (PT) balance training;bed mobility training;gait training;home exercise program;stair training;strengthening;transfer training   Anticipated Equipment Needs at Discharge (PT) walker, rolling   Risk & Benefits of therapy have been explained patient;daughter   PT Total Evaluation Time   PT Pedro, Moderate Complexity Minutes (76611) 10   Therapy Certification   Start of care date 04/28/23   Certification date from 04/28/23   Certification date to 05/05/23   Medical Diagnosis compression fracture of L2 and worsening height loss of L4 fracture   Physical Therapy Goals   PT Frequency Daily   PT Predicted Duration/Target Date for Goal Attainment 05/05/23   PT Goals Bed Mobility;Transfers;Gait   PT: Bed Mobility Minimal assist;Supine to/from sit;Within precautions   PT: Transfers Minimal assist;Sit to/from stand;Bed to/from chair;Assistive device   PT: Gait Minimal assist;Rolling walker;25 feet   PT Discharge Planning   PT Plan progress bed mobility, transfers/amb as philip, review spinal precautions   PT Discharge Recommendation (DC Rec) Transitional Care Facility   PT Rationale for DC Rec Pt unable to ambulate.   PT Brief overview of current status Bed mobility, mod Ax2. Sit <> stand x2,  min-mod Ax2.   Total Session Time   Total Session Time (sum of timed and untimed services) 10         M [facility]  OUTPATIENT PHYSICAL THERAPY EVALUATION  PLAN OF TREATMENT FOR OUTPATIENT REHABILITATION  (COMPLETE FOR INITIAL CLAIMS ONLY)  Patient's Last Name, First Name, M.I.  YOB: 1932  Tia Brooke                        Provider's Name  [facility] Medical Record No.  [id number]                             Onset Date:  04/27/23   Start of Care Date:  (P) 04/28/23   Type:     _X_PT   ___OT   ___SLP Medical Diagnosis:  (P) compression fracture of L2 and worsening height loss of L4 fracture              PT Diagnosis:  Impaired functional mobility Visits from SOC:  1     See note for plan of treatment, functional goals and certification details    I CERTIFY THE NEED FOR THESE SERVICES FURNISHED UNDER        THIS PLAN OF TREATMENT AND WHILE UNDER MY CARE     (Physician co-signature of this document indicates review and certification of the therapy plan).                Latricia Gorman, PT, DPT  4/28/2023

## 2023-04-28 NOTE — PROGRESS NOTES
"[facility]    Medicine Progress Note - Hospitalist Service    Date of Admission:  4/27/2023    Assessment & Plan      Tia Brooke is a 89 year old female with medical history of COPD and chronic atrial fibrillation on anticoagulation, admitted on 4/27/2023 after a mechanical fall, resulting in a scalp laceration, acute superior endplate compression fracture of L2 and progression of previous L4 compression fracture.    #L2 and L4 compression fractures:  - Neurosurgical/spine consultation appreciated; recommends LSO brace for comfort  - Pain management: Scheduled APAP 975 mg every 8 hours, oxycodone 2.5 to 5 mg every 4 hours as needed for moderate to severe pain.  Continue PTA gabapentin 100 mg 3 times daily  - PT/OT eval, will need TCU  - Follow-up with neurosurgery in 4 weeks with repeat x-ray if pain persists    #Scalp laceration s/p repair in the ER  - Local wound care.    #GERMAIN -renal function improved after IV fluids, creatinine 0.98 from 1.38  -Encourage oral hydration    #Mechanical fall:  - Fall precautions.    #Chronic atrial fibrillation  -Continue metoprolol for ventricular rate control with holding parameters  -On Eliquis for CVA prevention    #Hyperlipidemia on statin    #CODE STATUS: Confirmed DNR/DNI       Diet: Combination Diet Regular Diet Adult    DVT Prophylaxis: DOAC  Walsh Catheter: Not present  Lines: None     Cardiac Monitoring: None  Code Status: No CPR- Do NOT Intubate      Clinically Significant Risk Factors Present on Admission               # Drug Induced Coagulation Defect: home medication list includes an anticoagulant medication         # Obesity: Estimated body mass index is 32.81 kg/m  as calculated from the following:    Height as of this encounter: 1.473 m (4' 10\").    Weight as of this encounter: 71.2 kg (157 lb).           Disposition Plan     Expected Discharge Date: 04/28/2023      Destination: home with family            Miguelina Casillas, " MD  Hospitalist Service  [facility]  Securely message with [employer] (more info)  Text page via [employer] Paging/Directory   ______________________________________________________________________    Interval History   Patient complains of low back pain.  She reports having had to wear back brace years ago  No tingling or numbness  No headache  She has a walker at home and lives with her son      Physical Exam   Vital Signs: Temp: 98.1  F (36.7  C) Temp src: Oral BP: (!) 187/63 Pulse: 60   Resp: 16 SpO2: 100 % O2 Device: Nasal cannula Oxygen Delivery: 2 LPM  Weight: 157 lbs 0 oz    General: No acute distress  HEENT: Closed occipital scalp laceration  CV: Regular.  Normal S1-S2  Lungs: Clear  Abdomen: Soft and nontender  Extremities: Trace bilateral edema  Neuro: Awake and alert, answers appropriately, moves all extremities    Medical Decision Making       40 MINUTES SPENT BY ME on the date of service doing chart review, history, exam, documentation & further activities per the note.  MANAGEMENT DISCUSSED with the following over the past 24 hours: Patient       Data     I have personally reviewed the following data over the past 24 hrs:    8.6  \   10.0 (L)   / 216     140 105 20.8 /  98   4.3 28 0.98 (H) \       Trop: 41 (H) BNP: N/A       Imaging results reviewed over the past 24 hrs:   Recent Results (from the past 24 hour(s))   Lumbar spine CT w/o contrast    Narrative    EXAM: CT LUMBAR SPINE W/O CONTRAST  LOCATION: [facility]  DATE/TIME: 4/27/2023 8:15 PM CDT    INDICATION: Low back pain after fall  COMPARISON: Lumbar spine MRI 06/27/2013  TECHNIQUE: Routine CT Lumbar Spine without IV contrast. Multiplanar reformats. Dose reduction techniques were used.     FINDINGS:    VERTEBRA: Mild lower lumbar levocurvature centered at L3-L4. Grade 1 retrolisthesis stepwise at L1-L2 and L2-L3 most likely on a degenerative basis and similar to comparison. Visualized osseous  structures appear diffusely demineralized. Acute minimally   displaced fracture through the anterosuperior corner of the L2 vertebral body extending into the left ventral marginal osteophyte (coronal series 6 image 20) Redemonstrated L4 compression fracture with increased height loss now by approximately 30%.   Facets are intact.    CANAL/FORAMINA: Multilevel discogenic and facet degenerative changes. Multifocal vacuum discs. Moderate-severe spinal canal stenosis at L3-L4. Mild spinal canal stenosis at additional levels. Multifocal foraminal stenosis includes moderate narrowing   bilaterally at L3-L4 and L4-L5 with additional mild narrowing.    PARASPINAL: No visible soft tissue abnormality. Vascular calcifications compatible with atherosclerosis.      Impression    IMPRESSION:  1.  Acute minimally displaced fracture through the anterior superior endplate of L2 extending into a left marginal osteophyte.  2.  Compression fracture of L4 with mild-moderate approximately 30% height loss, increased since comparison MRI 06/27/2013.  3.  Diffuse osseous demineralization, which can limit CT assessment for subtle fractures.  4.  Mild lower lumbar levocurvature, grade 1 stepwise retrolisthesis at L1-L2 and L2-L3, and multilevel spondylosis including moderate-severe spinal canal stenosis at L3-L4.       XR Hip Bilateral 1 View Each    Narrative    EXAM: XR PELVIS W 2 VW HIPS BILATERAL  LOCATION: [facility]  DATE/TIME: 4/27/2023 8:26 PM CDT    INDICATION: Hip pain after fall  COMPARISON: None.      Impression    IMPRESSION: Right total hip arthroplasty. No fracture or dislocation. Nothing to suggest loosening. Advanced degenerative changes in the left hip.

## 2023-04-28 NOTE — H&P
"[facility]    History and Physical - Hospitalist Service       Date of Admission:  4/27/2023    Assessment & Plan      Tia Brooke is a 89 year old female with medical history of COPD and chronic atrial fibrillation on anticoagulation, admitted on 4/27/2023.  With an accidental fall, resulting in a scalp laceration, acute superior endplate compression fracture of L2 and progression of previous L4 compression fracture.    #L2 and L4 compression fractures:  - Neurosurgical/spine consultation.  - Pain management.  - Physical therapy evaluation once cleared by neurosurgery.    #Scalp laceration:  - Local wound care.    #Fall:  - Fall precautions.  Physical therapy evaluation and treatment.    # Acute hypoxic respiratory failure: resolved and so ruled out. O2 sats now 91%     Diet: Combination Diet Regular Diet Adult    DVT Prophylaxis: VTE Prophylaxis contraindicated due to patient already being on therapeutic anticoagulation  Walsh Catheter: Not present  Lines: None     Cardiac Monitoring: None  Code Status:   DNR    Clinically Significant Risk Factors Present on Admission               # Drug Induced Coagulation Defect: home medication list includes an anticoagulant medication      # Acute Respiratory Failure: Documented O2 saturation < 91%.  Continue supplemental oxygen as needed    # Obesity: Estimated body mass index is 32.81 kg/m  as calculated from the following:    Height as of this encounter: 1.473 m (4' 10\").    Weight as of this encounter: 71.2 kg (157 lb).           Disposition Plan Transitional care unit     Expected Discharge Date: 04/28/2023                  Sherley Dawson MD  Hospitalist Service  [facility]  Securely message with [employer] (more info)  Text page via [facility] Paging/Directory     ______________________________________________________________________    Chief Complaint   Fall x1 day    History is obtained from the patient    History of " Present Illness   Tia Brooke is a 89 year old female who presented to the ED following an accidental fall at home resulting in a scalp laceration as well as back and hip pain.  No LOC.  No chest pain.  No shortness of breath.  No nausea or vomiting.  No urinary symptoms.  No numbness or tingling.      Past Medical History    No past medical history on file.    Past Surgical History   Past Surgical History:   Procedure Laterality Date     COLECTOMY PARTIAL  11/01/2004    Sigmoid colectomy for adenocarcinoma     PHACOEMULSIFICATION CLEAR CORNEA WITH STANDARD INTRAOCULAR LENS IMPLANT Bilateral      TOTAL HIP ARTHROPLASTY Right 12/31/2009    Dr. Gurpreet Ho       Prior to Admission Medications   Prior to Admission Medications   Prescriptions Last Dose Informant Patient Reported? Taking?   CALCIUM CARBONATE/VITAMIN D3 (CALCIUM 600 + D,3, ORAL) 4/27/2023 at am  Yes Yes   Sig: Take 1 tablet by mouth every morning    GLUC DELGADO/CHONDRO DELGADO A/VIT C/MN (GLUCOSAMINE 1500 COMPLEX ORAL) 4/27/2023 at am  Yes Yes   Sig: Take 1 tablet by mouth every morning    albuterol (PROAIR HFA) 90 mcg/actuation inhaler Unknown at prn  Yes Yes   Sig: [ALBUTEROL (PROAIR HFA) 90 MCG/ACTUATION INHALER] Inhale 2 puffs every 4 (four) hours as needed. 2 puffs every 4-6 hours as needed          apixaban ANTICOAGULANT (ELIQUIS) 5 MG tablet 4/27/2023 at am  Yes Yes   Sig: Take 5 mg by mouth 2 times daily   atorvastatin (LIPITOR) 20 MG tablet 4/26/2023 at hs  Yes Yes   Sig: [ATORVASTATIN (LIPITOR) 20 MG TABLET] Take 20 mg by mouth at bedtime.          cholecalciferol, vitamin D3, 1,000 unit tablet 4/27/2023 at am  Yes Yes   Sig: Take 1,000 Units by mouth every morning    fluticasone (ARNUITY ELLIPTA) 100 MCG/ACT inhaler 4/27/2023 at am  Yes Yes   Sig: Inhale 1 puff into the lungs every morning   furosemide (LASIX) 20 MG tablet 4/27/2023 at am  Yes Yes   Sig: Take 20 mg by mouth every morning   gabapentin (NEURONTIN) 100 MG capsule 4/27/2023 at x 2   Yes Yes   Sig: Take 100 mg by mouth 3 times daily   ipratropium-albuterol (DUO-NEB) 0.5-2.5 mg/3 mL nebulizer Unknown at prn  No Yes   Sig: [IPRATROPIUM-ALBUTEROL (DUO-NEB) 0.5-2.5 MG/3 ML NEBULIZER] Inhale 3 mL every 6 (six) hours as needed.   metoprolol tartrate (LOPRESSOR) 25 MG tablet 4/27/2023 at am  No Yes   Sig: Take 1 tablet (25 mg) by mouth 2 times daily   Patient taking differently: Take 37.5 mg by mouth 2 times daily   montelukast (SINGULAIR) 10 mg tablet 4/26/2023 at hs  Yes Yes   Sig: [MONTELUKAST (SINGULAIR) 10 MG TABLET] Take 10 mg by mouth bedtime.    multivitamin (OCUVITE) TABS tablet 4/27/2023 at am  Yes Yes   Sig: Take 1 tablet by mouth daily   omega-3 fatty acids-fish oil (FISH OIL) 360-1,200 mg cap 4/27/2023 at am  Yes Yes   Sig: Take 1 capsule by mouth every morning    traZODone (DESYREL) 50 MG tablet 4/26/2023 at hs  Yes Yes   Sig: Take 50 mg by mouth At Bedtime      Facility-Administered Medications: None        Review of Systems    The 10 point Review of Systems is negative other than noted in the HPI .      Physical Exam   Vital Signs: Temp: 98.1  F (36.7  C) Temp src: Oral BP: (!) 174/71 Pulse: 59   Resp: 18 SpO2: 100 % O2 Device: Nasal cannula Oxygen Delivery: 2 LPM  Weight: 157 lbs 0 oz    General Appearance: Pleasant, well-nourished elderly female, in no acute distress.  Respiratory: Clear to auscultation with good air entry bilaterally.  Cardiovascular: Irregular.  S1 and S2 normal.  No rubs.  GI: Soft, nontender, nondistended.  Bowel sounds present.  No organomegaly.  Skin: Closed occipital scalp laceration.  Clean and dry.  Other: Lumbar spine tenderness.  Alert, oriented x3.  No focal signs.    Medical Decision Making     77 MINUTES SPENT BY ME on the date of service doing chart review, history, exam, documentation & further activities per the note.      Data     I have personally reviewed the following data over the past 24 hrs:    10.4  \   11.5 (L)   / 267     140 101 23.8 (H) /   124 (H)   4.1 30 (H) 1.38 (H) \       Trop: 41 (H) BNP: 348       INR:  1.24 (H) PTT:  29   D-dimer:  N/A Fibrinogen:  N/A       Imaging results reviewed over the past 24 hrs:   Recent Results (from the past 24 hour(s))   Head CT w/o contrast    Narrative    EXAM: CT HEAD W/O CONTRAST  LOCATION: [facility]  DATE/TIME: 4/27/2023 3:21 PM CDT    INDICATION: Trauma. Anticoagulated.  COMPARISON: CT head 06/21/2016.  TECHNIQUE: Routine CT Head without IV contrast. Multiplanar reformats. Dose reduction techniques were used.    FINDINGS:    INTRACRANIAL CONTENTS: Gray-white matter differentiation is maintained. No hemorrhage or extraaxial collection. No mass effect. Subarachnoid cisterns are patent. Patchy white matter hypodensities are, while nonspecific, most compatible with chronic   microvascular ischemic changes. Proportional prominence of the ventricles and sulci is compatible with diffuse cerebral volume loss.    VISUALIZED ORBITS/SINUSES/MASTOIDS: Bilateral lens replacements. Paranasal sinuses are clear. No middle ear or mastoid effusion.    BONES/SOFT TISSUES: Posterior parietal scalp hematoma and laceration without displaced calvarial fracture.      Impression    IMPRESSION:  1.  No acute traumatic intracranial abnormality.   2.  Posterior parieto-occipital scalp soft tissue injury without displaced calvarial fracture.       Lumbar spine CT w/o contrast    Narrative    EXAM: CT LUMBAR SPINE W/O CONTRAST  LOCATION: [facility]  DATE/TIME: 4/27/2023 8:15 PM CDT    INDICATION: Low back pain after fall  COMPARISON: Lumbar spine MRI 06/27/2013  TECHNIQUE: Routine CT Lumbar Spine without IV contrast. Multiplanar reformats. Dose reduction techniques were used.     FINDINGS:    VERTEBRA: Mild lower lumbar levocurvature centered at L3-L4. Grade 1 retrolisthesis stepwise at L1-L2 and L2-L3 most likely on a degenerative basis and similar to comparison. Visualized osseous  structures appear diffusely demineralized. Acute minimally   displaced fracture through the anterosuperior corner of the L2 vertebral body extending into the left ventral marginal osteophyte (coronal series 6 image 20) Redemonstrated L4 compression fracture with increased height loss now by approximately 30%.   Facets are intact.    CANAL/FORAMINA: Multilevel discogenic and facet degenerative changes. Multifocal vacuum discs. Moderate-severe spinal canal stenosis at L3-L4. Mild spinal canal stenosis at additional levels. Multifocal foraminal stenosis includes moderate narrowing   bilaterally at L3-L4 and L4-L5 with additional mild narrowing.    PARASPINAL: No visible soft tissue abnormality. Vascular calcifications compatible with atherosclerosis.      Impression    IMPRESSION:  1.  Acute minimally displaced fracture through the anterior superior endplate of L2 extending into a left marginal osteophyte.  2.  Compression fracture of L4 with mild-moderate approximately 30% height loss, increased since comparison MRI 06/27/2013.  3.  Diffuse osseous demineralization, which can limit CT assessment for subtle fractures.  4.  Mild lower lumbar levocurvature, grade 1 stepwise retrolisthesis at L1-L2 and L2-L3, and multilevel spondylosis including moderate-severe spinal canal stenosis at L3-L4.       XR Hip Bilateral 1 View Each    Narrative    EXAM: XR PELVIS W 2 VW HIPS BILATERAL  LOCATION: [facility]  DATE/TIME: 4/27/2023 8:26 PM CDT    INDICATION: Hip pain after fall  COMPARISON: None.      Impression    IMPRESSION: Right total hip arthroplasty. No fracture or dislocation. Nothing to suggest loosening. Advanced degenerative changes in the left hip.

## 2023-04-28 NOTE — PROGRESS NOTES
Occupational Therapy      04/28/23 1515   Appointment Info   Signing Clinician's Name / Credentials (OT) Kavya Becerra OTR/L   Quick Adds   Quick Adds Certification   Living Environment   People in Home child(angel), adult  (adult son)   Current Living Arrangements house   Home Accessibility stairs to enter home   Number of Stairs, Main Entrance 2   Stair Railings, Main Entrance railings safe and in good condition   Transportation Anticipated family or friend will provide   Living Environment Comments W/I shower w/ GB and shower chair   Self-Care   Usual Activity Tolerance good   Current Activity Tolerance poor   Regular Exercise No   Equipment Currently Used at Home walker, rolling  (4ww and fww)   Activity/Exercise/Self-Care Comment Ind. w/ ADL routine/IADL tasks   General Information   Onset of Illness/Injury or Date of Surgery 04/27/23   Referring Physician Nuha Carter MD   Additional Occupational Profile Info/Pertinent History of Current Problem 89 year old female who was admitted on 4/27/2023. I was asked to see the patient for fall with complaint of worsening back pain. CT lumbar spine with noted mild compression fracture of L2 and worsening height loss of L4 fracture of which was present in 2013.   Existing Precautions/Restrictions fall;spinal  (Orthotics to fit patient with LSO brace for comfort)   General Observations and Info Okay to advance activity as tolerated prior to brace arriving   Cognitive Status Examination   Orientation Status orientation to person, place and time   Bed Mobility   Bed Mobility supine-sit   Supine-Sit [place] (Bed Mobility) moderate assist (50% patient effort);2 person assist   Transfers   Transfers sit-stand transfer   Sit-Stand Transfer   Sit-Stand [place] (Transfers) contact guard;verbal cues   Assistive Device (Sit-Stand Transfers) walker, front-wheeled   Balance   Balance Assessment standing balance: dynamic   Activities of Daily Living   BADL  Assessment/Intervention lower body dressing   Lower Body Dressing Assessment/Training   Position (Lower Body Dressing) unsupported sitting   Comment, (Lower Body Dressing) Pt will require assist for dressing/toileting/bathing d/t spinal precautions/weakness   [place] Level (Lower Body Dressing) moderate assist (50% patient effort)   Clinical Impression   Criteria for Skilled Therapeutic Interventions Met (OT) Yes, treatment indicated   OT Diagnosis decreased act.tolerance/ADL ind.   OT Problem List-Impairments impacting ADL problems related to;activity tolerance impaired;balance;strength   Assessment of Occupational Performance 1-3 Performance Deficits   Planned Therapy Interventions (OT) ADL retraining;balance training;strengthening;transfer training;home program guidelines   Clinical Decision Making Complexity (OT) low complexity   Risk & Benefits of therapy have been explained evaluation/treatment results reviewed;patient   OT Total Evaluation Time   OT Eval, Low Complexity Minutes (34978) 10   Therapy Certification   Medical Diagnosis closed fx. L2   Start of Care Date 04/28/23   Certification date from 04/28/23   Certification date to 05/04/23   OT Goals   Therapy Frequency (OT) Daily   OT Predicted Duration/Target Date for Goal Attainment 05/04/23   OT Goals Bed Mobility;Transfers;Toilet Transfer/Toileting;Lower Body Dressing   OT: Lower Body Dressing Supervision/stand-by assist;using adaptive equipment;within precautions   OT: Bed Mobility Supervision/stand-by assist;supine to/from sitting;within precautions   OT: Transfer Supervision/stand-by assist;within precautions;with assistive device   OT: Toilet Transfer/Toileting Supervision/stand-by assist;using adaptive equipment   Self-Care/Home Management   Self-Care/Home Mgmt/ADL, Compensatory, Meal Prep Minutes (06910) 10   Symptoms Noted During/After Treatment (Meal Preparation/Planning Training) increased pain;fatigue   Treatment Detail/Skilled  Intervention pt required education/training in Log roll tech to assist w/ maintainng spinal precautions- Once EOB Mod.A for LB dressing pt unable to demo use of fig.4 tech to assist w/ following precautions, STSx2 CGA w/ fww, pt able to ambulate a few steps towards HOB w/ CGAx2 fww no LOB, Mod.A to thread/pull up brief no LOB, Min.A EOB>supine daughter present for session   OT Discharge Planning   OT Plan LB dressing w/ AE, check when brace arrives per Neuro okay to mobilize w/o brace, toileting   OT Discharge Recommendation (DC Rec) Transitional Care Facility   OT Rationale for DC Rec CGAx2 fww for support, OT recommending TCU upon d/c to enhance safety w/ mobility/ADL routine- pt did not ambulate able to take a couple steps w/ assistx2 towards head of bed w/ fww, pt lives w/ son in home normally pt is very ind.   OT Brief overview of current status CGAx2 w/ fww- will have brace coming   Total Session Time   Timed Code Treatment Minutes 10   Total Session Time (sum of timed and untimed services) 20    [facility]  OUTPATIENT OCCUPATIONAL THERAPY  EVALUATION  PLAN OF TREATMENT FOR OUTPATIENT REHABILITATION  (COMPLETE FOR INITIAL CLAIMS ONLY)  Patient's Last Name, First Name, M.I.  YOB: 1932  Tia Brooke                          Provider's Name  [facility] Medical Record No.  [id number]                             Onset Date:  04/27/23   Start of Care Date:  04/28/23   Type:     ___PT   _X_OT   ___SLP Medical Diagnosis:  closed fx. L2                    OT Diagnosis:  decreased act.tolerance/ADL ind. Visits from SOC:  1     See note for plan of treatment, functional goals and certification details    I CERTIFY THE NEED FOR THESE SERVICES FURNISHED UNDER        THIS PLAN OF TREATMENT AND WHILE UNDER MY CARE     (Physician co-signature of this document indicates review and certification of the therapy plan).

## 2023-04-28 NOTE — CONSULTS
Care Management Initial Consult    General Information  Assessment completed with: Patient,    Type of CM/SW Visit: Initial Assessment    Primary Care Provider verified and updated as needed: Yes   Readmission within the last 30 days: no previous admission in last 30 days      Reason for Consult: discharge planning  Advance Care Planning:            Communication Assessment  Patient's communication style: spoken language (English or Bilingual)             Cognitive  Cognitive/Neuro/Behavioral: WDL                      Living Environment:   People in home: child(angel), adult     Current living Arrangements: house      Able to return to prior arrangements: yes       Family/Social Support:  Care provided by: self  Provides care for: no one  Marital Status:   Children          Description of Support System: Involved, Supportive         Current Resources:   Patient receiving home care services: No     Community Resources: None  Equipment currently used at home:    Supplies currently used at home: None    Employment/Financial:  Employment Status: retired        Financial Concerns: No concerns identified   Referral to Financial Worker: No     Lifestyle & Psychosocial Needs:  Social Determinants of Health     Tobacco Use: Low Risk  (7/11/2021)    Patient History      Smoking Tobacco Use: Never      Smokeless Tobacco Use: Never      Passive Exposure: Not on file   Alcohol Use: Not on file   Financial Resource Strain: Not on file   Food Insecurity: Not on file   Transportation Needs: Not on file   Physical Activity: Not on file   Stress: Not on file   Social Connections: Not on file   Intimate Partner Violence: Not on file   Depression: Not on file   Housing Stability: Not on file       Functional Status:  Prior to admission patient needed assistance:   Dependent ADLs:: Ambulation-walker  Dependent IADLs:: Transportation       Mental Health Status:  Mental Health Status: No Current Concerns       Chemical Dependency  Status:  Chemical Dependency Status: No Current Concerns             Values/Beliefs:  Spiritual, Cultural Beliefs, Yazidi Practices, Values that affect care: no               Additional Information:  SW introduced self and CM services to Pt.  Pt reports living in a house with her son.  At baseline, Pt reports independence with I/ADLs (other than transportation, which children assist with).  Pt uses a walker at baseline.  Pt currently on oxygen, however not on oxygen at baseline.      PT and OT consults pending, CM following for recommendations.     Discussed observation notice.      Family to transport at discharge.     DES Grace

## 2023-04-28 NOTE — PHARMACY-ADMISSION MEDICATION HISTORY
Pharmacist Admission Medication History    Admission medication history is complete. The information provided in this note is only as accurate as the sources available at the time of the update.    Medication reconciliation/reorder completed by provider prior to medication history? No    Information Source(s): Patient, Clinic records and CareEverywhere/SureScripts via in-person    Pertinent Information: -    Changes made to PTA medication list:    Added: Eliquis, gabapentin, trazodone    Deleted: aspirin,omeprazole     Changed: metoprolol     Medication Affordability:       Allergies reviewed with patient and updates made in EHR: yes    Medication History Completed By: DANDRE PALMER [facility] 4/27/2023 10:03 PM    Prior to Admission medications    Medication Sig Last Dose Taking? Auth Provider Long Term End Date   albuterol (PROAIR HFA) 90 mcg/actuation inhaler [ALBUTEROL (PROAIR HFA) 90 MCG/ACTUATION INHALER] Inhale 2 puffs every 4 (four) hours as needed. 2 puffs every 4-6 hours as needed        Unknown at prn Yes Mini Ribeiro MD     apixaban ANTICOAGULANT (ELIQUIS) 5 MG tablet Take 5 mg by mouth 2 times daily 4/27/2023 at am Yes Unknown, Entered By History     atorvastatin (LIPITOR) 20 MG tablet [ATORVASTATIN (LIPITOR) 20 MG TABLET] Take 20 mg by mouth at bedtime.        4/26/2023 at hs Yes Provider, Historical Yes    CALCIUM CARBONATE/VITAMIN D3 (CALCIUM 600 + D,3, ORAL) Take 1 tablet by mouth every morning  4/27/2023 at am Yes Provider, Historical     cholecalciferol, vitamin D3, 1,000 unit tablet Take 1,000 Units by mouth every morning  4/27/2023 at am Yes Provider, Historical     fluticasone (ARNUITY ELLIPTA) 100 MCG/ACT inhaler Inhale 1 puff into the lungs every morning 4/27/2023 at am Yes Unknown, Entered By History     furosemide (LASIX) 20 MG tablet Take 20 mg by mouth every morning 4/27/2023 at am Yes Unknown, Entered By History Yes    gabapentin (NEURONTIN) 100 MG capsule Take 100 mg by mouth 3 times  daily 4/27/2023 at x 2 Yes Unknown, Entered By History Yes    GLUC DELGADO/CHONDRO DELGADO A/VIT C/MN (GLUCOSAMINE 1500 COMPLEX ORAL) Take 1 tablet by mouth every morning  4/27/2023 at am Yes Provider, Historical     HYDROcodone-acetaminophen (NORCO) 5-325 MG tablet Take 1 tablet by mouth every 6 hours as needed for severe pain  Yes Aroldo Dwyer MD  4/30/23   ipratropium-albuterol (DUO-NEB) 0.5-2.5 mg/3 mL nebulizer [IPRATROPIUM-ALBUTEROL (DUO-NEB) 0.5-2.5 MG/3 ML NEBULIZER] Inhale 3 mL every 6 (six) hours as needed. Unknown at prn Yes Manda Benton MD Yes    metoprolol tartrate (LOPRESSOR) 25 MG tablet Take 1 tablet (25 mg) by mouth 2 times daily  Patient taking differently: Take 37.5 mg by mouth 2 times daily 4/27/2023 at am Yes Ryanne Haley MD Yes    montelukast (SINGULAIR) 10 mg tablet [MONTELUKAST (SINGULAIR) 10 MG TABLET] Take 10 mg by mouth bedtime.  4/26/2023 at hs Yes Provider, Historical     multivitamin (OCUVITE) TABS tablet Take 1 tablet by mouth daily 4/27/2023 at am Yes Unknown, Entered By History     omega-3 fatty acids-fish oil (FISH OIL) 360-1,200 mg cap Take 1 capsule by mouth every morning  4/27/2023 at am Yes Provider, Historical     traZODone (DESYREL) 50 MG tablet Take 50 mg by mouth At Bedtime 4/26/2023 at hs Yes Unknown, Entered By History Yes

## 2023-04-28 NOTE — PROGRESS NOTES
"PRIMARY DIAGNOSIS: \"GENERIC\" NURSING  OUTPATIENT/OBSERVATION GOALS TO BE MET BEFORE DISCHARGE:  1. ADLs back to baseline: No    2. Activity and level of assistance: will work with therapy this afternoon    3. Pain status: Improved-controlled with oral pain medications.    4. Return to near baseline physical activity: No     Discharge Planner Nurse   Safe discharge environment identified: No  Barriers to discharge: Yes-needs new brace       Entered by: Abigail Bezn RN 04/28/2023 4:35 PM     Please review provider order for any additional goals.   Nurse to notify provider when observation goals have been met and patient is ready for discharge.  "

## 2023-04-28 NOTE — PROGRESS NOTES
Patient arrived from the ED, alert, oriented, pleasant. Slightly Koi but able to communicate well. Daughters here with patient. Taking food and fluids well, medicated with oxycodone x1 and scheduled tylenol for adequate relief of pain. Will monitor.

## 2023-04-28 NOTE — PLAN OF CARE
Patient a&o, c/o pain on back and knees, head laceration after a fall. 2l of O2, regular diet, purewick. , pt/ot and neurosugery consulted. Got pain medicine twice.

## 2023-04-29 ENCOUNTER — APPOINTMENT (OUTPATIENT)
Dept: PHYSICAL THERAPY | Facility: HOSPITAL | Age: 88
DRG: 552 | End: 2023-04-29
Payer: MEDICARE

## 2023-04-29 ENCOUNTER — APPOINTMENT (OUTPATIENT)
Dept: PHYSICAL THERAPY | Facility: HOSPITAL | Age: 88
DRG: 552 | End: 2023-04-29
Attending: INTERNAL MEDICINE
Payer: MEDICARE

## 2023-04-29 PROCEDURE — 120N000001 HC R&B MED SURG/OB

## 2023-04-29 PROCEDURE — G0378 HOSPITAL OBSERVATION PER HR: HCPCS

## 2023-04-29 PROCEDURE — 97530 THERAPEUTIC ACTIVITIES: CPT | Mod: GP

## 2023-04-29 PROCEDURE — 250N000013 HC RX MED GY IP 250 OP 250 PS 637: Performed by: INTERNAL MEDICINE

## 2023-04-29 PROCEDURE — 99232 SBSQ HOSP IP/OBS MODERATE 35: CPT | Performed by: INTERNAL MEDICINE

## 2023-04-29 PROCEDURE — 97535 SELF CARE MNGMENT TRAINING: CPT | Mod: GP

## 2023-04-29 RX ORDER — LIDOCAINE 4 G/G
2 PATCH TOPICAL
Status: DISCONTINUED | OUTPATIENT
Start: 2023-04-29 | End: 2023-05-01 | Stop reason: HOSPADM

## 2023-04-29 RX ORDER — METHOCARBAMOL 500 MG/1
250-500 TABLET ORAL 3 TIMES DAILY PRN
Status: DISCONTINUED | OUTPATIENT
Start: 2023-04-29 | End: 2023-04-30

## 2023-04-29 RX ADMIN — MONTELUKAST 10 MG: 10 TABLET, FILM COATED ORAL at 21:10

## 2023-04-29 RX ADMIN — OXYCODONE HYDROCHLORIDE 2.5 MG: 5 TABLET ORAL at 17:21

## 2023-04-29 RX ADMIN — Medication 37.5 MG: at 10:35

## 2023-04-29 RX ADMIN — SENNOSIDES AND DOCUSATE SODIUM 1 TABLET: 50; 8.6 TABLET ORAL at 21:11

## 2023-04-29 RX ADMIN — Medication 1 TABLET: at 10:33

## 2023-04-29 RX ADMIN — Medication 25 MCG: at 10:34

## 2023-04-29 RX ADMIN — TRAZODONE HYDROCHLORIDE 50 MG: 50 TABLET ORAL at 21:10

## 2023-04-29 RX ADMIN — ACETAMINOPHEN 975 MG: 325 TABLET ORAL at 08:51

## 2023-04-29 RX ADMIN — OXYCODONE HYDROCHLORIDE 2.5 MG: 5 TABLET ORAL at 08:51

## 2023-04-29 RX ADMIN — OXYCODONE HYDROCHLORIDE 2.5 MG: 5 TABLET ORAL at 13:09

## 2023-04-29 RX ADMIN — FLUTICASONE FUROATE 1 PUFF: 100 POWDER RESPIRATORY (INHALATION) at 10:35

## 2023-04-29 RX ADMIN — FUROSEMIDE 20 MG: 20 TABLET ORAL at 10:34

## 2023-04-29 RX ADMIN — GABAPENTIN 100 MG: 100 CAPSULE ORAL at 10:34

## 2023-04-29 RX ADMIN — Medication 1 CAPSULE: at 10:35

## 2023-04-29 RX ADMIN — SENNOSIDES AND DOCUSATE SODIUM 1 TABLET: 50; 8.6 TABLET ORAL at 10:33

## 2023-04-29 RX ADMIN — LIDOCAINE 2 PATCH: 4 PATCH TOPICAL at 10:32

## 2023-04-29 RX ADMIN — APIXABAN 5 MG: 5 TABLET, FILM COATED ORAL at 21:10

## 2023-04-29 RX ADMIN — GABAPENTIN 100 MG: 100 CAPSULE ORAL at 21:10

## 2023-04-29 RX ADMIN — APIXABAN 5 MG: 5 TABLET, FILM COATED ORAL at 10:34

## 2023-04-29 RX ADMIN — ATORVASTATIN CALCIUM 20 MG: 10 TABLET, FILM COATED ORAL at 21:11

## 2023-04-29 RX ADMIN — GABAPENTIN 100 MG: 100 CAPSULE ORAL at 13:08

## 2023-04-29 RX ADMIN — ACETAMINOPHEN 975 MG: 325 TABLET ORAL at 17:21

## 2023-04-29 ASSESSMENT — ACTIVITIES OF DAILY LIVING (ADL)
ADLS_ACUITY_SCORE: 41
ADLS_ACUITY_SCORE: 43
ADLS_ACUITY_SCORE: 43
ADLS_ACUITY_SCORE: 41
ADLS_ACUITY_SCORE: 43

## 2023-04-29 NOTE — PROGRESS NOTES
"[facility]    Medicine Progress Note - Hospitalist Service    Date of Admission:  4/27/2023    Assessment & Plan   Tia Brooke is a 89 year old female with medical history of COPD and chronic atrial fibrillation on anticoagulation, admitted on 4/27/2023 after a mechanical fall, resulting in a scalp laceration, acute superior endplate compression fracture of L2 and progression of previous L4 compression fracture.    #L2 and L4 compression fractures: No significant low back pain   - Neurosurgical/spine consultation appreciated; [place] corset for comfort  - Pain management: Scheduled APAP 975 mg every 8 hours, oxycodone 2.5 to 5 mg every 4 hours as needed for moderate to severe pain.  Continue PTA gabapentin 100 mg 3 times daily  - PT/OT eval, will need TCU  - Follow-up with neurosurgery in 4 weeks with repeat x-ray if pain persists    # Neck pain   - offered imaging, patient and family wants to hold off   -  Robaxin added by Neurosurgery   - ice prn     #Left foot pain - ROM ok, doubt fractured    #Scalp laceration s/p repair in the ER  - Local wound care.    #GERMAIN -renal function improved after IV fluids, creatinine 0.98 from 1.38  -Encourage oral hydration    #Mechanical fall:  - Fall precautions.    #Chronic atrial fibrillation  -Continue metoprolol for ventricular rate control with holding parameters  -On Eliquis for CVA prevention    #Hyperlipidemia on statin     Diet: Combination Diet Regular Diet Adult    DVT Prophylaxis: DOAC  Walsh Catheter: Not present  Lines: None     Cardiac Monitoring: None  Code Status: No CPR- Do NOT Intubate      Clinically Significant Risk Factors Present on Admission               # Drug Induced Coagulation Defect: home medication list includes an anticoagulant medication         # Obesity: Estimated body mass index is 32.81 kg/m  as calculated from the following:    Height as of this encounter: 1.473 m (4' 10\").    Weight as of this encounter: 71.2 kg " (157 lb).           Disposition Plan      Expected Discharge Date: 04/30/2023      Destination: home with family            Miguelina Casillas MD  Hospitalist Service  [facility]  Securely message with Guide (more info)  Text page via [email or web address] Paging/Directory   ______________________________________________________________________    Interval History   Very tender in her neck, shoulders and was also having severe left foot pain this morning which has improved   Eating well  No BM for 3 days     Physical Exam   Vital Signs: Temp: 98  F (36.7  C) Temp src: Oral BP: (!) 141/61 Pulse: 57   Resp: 16 SpO2: 100 % O2 Device: None (Room air) Oxygen Delivery: 2 LPM  Weight: 157 lbs 0 oz    General: Very uncomfortable due to pain   HEENT: Closed occipital scalp laceration  CV: Regular.  Normal S1-S2  Lungs: Clear  Abdomen: Soft and nontender  Extremities: Trace bilateral leg edema, 2 + right foot edema, left ankle with some tenderness with movements   Neuro: Awake and alert, answers appropriately, moves all extremities    Medical Decision Making       38 MINUTES SPENT BY ME on the date of service doing chart review, history, exam, documentation & further activities per the note.  MANAGEMENT DISCUSSED with the following over the past 24 hours: patient, family, RN and Neurosurgery        Data         Imaging results reviewed over the past 24 hrs:   No results found for this or any previous visit (from the past 24 hour(s)).

## 2023-04-29 NOTE — PLAN OF CARE
"PRIMARY DIAGNOSIS: \"GENERIC\" NURSING  OUTPATIENT/OBSERVATION GOALS TO BE MET BEFORE DISCHARGE:  ADLs back to baseline: No    Activity and level of assistance: Up with maximum assistance. Consider SW and/or PT evaluation.     Pain status: Improved-controlled with oral pain medications.    Return to near baseline physical activity: No     Discharge Planner Nurse   Safe discharge environment identified: No  Barriers to discharge: Yes       Entered by: Yadi Han RN 04/29/2023 1:01 AM     Please review provider order for any additional goals.   Nurse to notify provider when observation goals have been met and patient is ready for discharge.Goal Outcome Evaluation:                        "

## 2023-04-29 NOTE — PROGRESS NOTES
"Neurosurgery Follow UP  April 29, 2023    A/P: Tia Brooke is a 89 year old admitted to [facility] after a fall sustaining a scalp laceration, Acute very mild L2 fracture, acute on chronic L4 fracture (present 2013). Today Tia is complaining of terrible left sided neck, shoulder pain. No cervical images in ED. Offered today vs trial of medications, lidocaine patches, ice, methocarbamol. Family would like to try medications first. Left foot was painful overnight but better this am. Discussed with hospitalist.     PLAN:   1. [place] corset for comfort when out of bed  2. Cervical imaging if pain does not improve  3. Need upright spine XR when able   4. PT/OT    Physical Exam  BP (!) 141/61 (BP Location: Right arm)   Pulse 57   Temp 98  F (36.7  C) (Oral)   Resp 16   Ht 1.473 m (4' 10\")   Wt 71.2 kg (157 lb)   SpO2 100%   BMI 32.81 kg/m      General: alert, oriented. HARMON. Speech clear.     Motor: normal bulk and tone for age     Strength:   Stiff painful movement of left arm, leg.   Right side more mobile  Appears to have full strength     Sensation: intact to light touch     Imaging: personally reviewed    KATIE Torres  [facility] Neurosurgery  O: [phone]        "

## 2023-04-29 NOTE — PLAN OF CARE
"PRIMARY DIAGNOSIS: \"GENERIC\" NURSING  OUTPATIENT/OBSERVATION GOALS TO BE MET BEFORE DISCHARGE:  1. ADLs back to baseline: No    2. Activity and level of assistance: Up with maximum assistance. Consider SW and/or PT evaluation.     3. Pain status: Improved-controlled with oral pain medications.    4. Return to near baseline physical activity: No     Discharge Planner Nurse   Safe discharge environment identified: No  Barriers to discharge: Yes       Entered by: Yadi Han RN 04/29/2023 6:18 AM   Pt slept most of the night,PRN dilaudid and scheduled tylenol effective for pain control  Please review provider order for any additional goals.   Nurse to notify provider when observation goals have been met and patient is ready for discharge.Goal Outcome Evaluation:                        "

## 2023-04-29 NOTE — PROGRESS NOTES
Care Management Follow Up    Length of Stay (days): 0    Expected Discharge Date: 05/01/2023     Concerns to be Addressed: discharge planning       Patient plan of care discussed at interdisciplinary rounds: yes    Anticipated Discharge Disposition: Home Care     Anticipated Discharge Services: Other (see comment) (PT OT)    Anticipated Discharge DME: Other (see comment) (aspen corset brace)    Patient/family educated on Medicare website which has current facility and service quality ratings: yes    Education Provided on the Discharge Plan: Yes    Patient/Family in Agreement with the Plan: yes    Referrals Placed by CM/SW: Homecare    Private pay costs discussed: N/A    Additional Information: SW met with pt, pt's daughter, and pt's son to discuss discharge planning.  Discussed therapy recs for TCU.  Pt and family decline this.  Pt and family stated that plan for pt to go home with home care services and family can provide 24 hour care and supervision for pt.  Family members (pt's children and grandchildren) plan to develop a schedule and will switch off.  One of pt's sons Greg also lives in home with pt.  Pt has walk in shower and grab bars.  Pt lives in a one level rambler and only two steps to get in.  Pt and family agreeable to home care services of PT and OT.  Pt has had home care in the past years ago but cannot recall name of agency.  Pt has no preference of home care agency. Family to transport.     SW sent referral to [facility] for PT OT services.  They later accepted pt with start of care of 5/1/23.         AGGIE Toscano, SHRADDHA 04/29/23 6:35 PM

## 2023-04-29 NOTE — UTILIZATION REVIEW
Inpatient appropriate  Admission Status; Secondary Review Determination     Under the authority of the Utilization Management Committee, the utilization review process indicated a secondary review on the above patient. The review outcome is based on review of the medical records, discussions with staff, and applying clinical experience noted on the date of the review.     (x) Inpatient Status Appropriate - This patient's medical care is consistent with medical management for inpatient care and reasonable inpatient medical practice.     RATIONALE FOR DETERMINATION   89-year-old female brought to the ED by ambulance on 4/27/2023 for evaluation after a fall with head trauma.  Past medical history of COPD, atrial fibrillation, chronic anticoagulation, hypertension, hyperlipidemia, osteopenia, low back pain, colon cancer, obesity, Gilbert's syndrome, hearing loss, knee pain, macular degeneration, asthma.  CT head showed no acute traumatic intracranial abnormality, posterior parietal occipital scalp soft tissue injury.  CT lumbar spine showed acute minimally displaced fracture through the anterior superior endplate of L2 extending into a left marginal osteophyte; compression fracture of L4 with mild-moderate approximately 30% height loss increased since 2013.  Laboratory work-up remarkable for creatinine 1.38.  Neurosurgery team consulted and recommended Aspen corset when out of bed.  Pain management with scheduled acetaminophen, PTA gabapentin and as needed oxycodone.  Today, complaining of intense left neck pain and shoulder pain and recommended cervical imaging but patient and family deferred.  May need further cervical imaging if symptoms are not improving.  Creatinine improved after IV fluid hydration and oral hydration is encouraged.  At the time of admission with the information available to the attending physician more than 2 nights Hospital complex care was anticipated, based on patient risk of adverse outcome if  treated as outpatient and complex care required. Inpatient admission is appropriate based on the Medicare guidelines.     This document was produced using voice recognition software     The information on this document is developed by the utilization review team in order for the business office to ensure compliance. This only denotes the appropriateness of proper admission status and does not reflect the quality of care rendered.   The definitions of Inpatient Status and Observation Status used in making the determination above are those provided in the CMS Coverage Manual, Chapter 1 and Chapter 6, section 70.4.     Sincerely,     Heladio Rizvi MD  [facility]  Utilization Review Physician Advisor  Pager: [phone]

## 2023-04-30 ENCOUNTER — APPOINTMENT (OUTPATIENT)
Dept: CT IMAGING | Facility: HOSPITAL | Age: 88
DRG: 552 | End: 2023-04-30
Attending: INTERNAL MEDICINE
Payer: MEDICARE

## 2023-04-30 ENCOUNTER — APPOINTMENT (OUTPATIENT)
Dept: PHYSICAL THERAPY | Facility: HOSPITAL | Age: 88
DRG: 552 | End: 2023-04-30
Payer: MEDICARE

## 2023-04-30 ENCOUNTER — APPOINTMENT (OUTPATIENT)
Dept: RADIOLOGY | Facility: HOSPITAL | Age: 88
DRG: 552 | End: 2023-04-30
Attending: INTERNAL MEDICINE
Payer: MEDICARE

## 2023-04-30 ENCOUNTER — APPOINTMENT (OUTPATIENT)
Dept: RADIOLOGY | Facility: HOSPITAL | Age: 88
DRG: 552 | End: 2023-04-30
Attending: PHYSICIAN ASSISTANT
Payer: MEDICARE

## 2023-04-30 PROCEDURE — 73630 X-RAY EXAM OF FOOT: CPT | Mod: LT

## 2023-04-30 PROCEDURE — 250N000013 HC RX MED GY IP 250 OP 250 PS 637: Performed by: INTERNAL MEDICINE

## 2023-04-30 PROCEDURE — 120N000001 HC R&B MED SURG/OB

## 2023-04-30 PROCEDURE — 99232 SBSQ HOSP IP/OBS MODERATE 35: CPT | Performed by: INTERNAL MEDICINE

## 2023-04-30 PROCEDURE — G1010 CDSM STANSON: HCPCS

## 2023-04-30 PROCEDURE — 97535 SELF CARE MNGMENT TRAINING: CPT | Mod: GP

## 2023-04-30 PROCEDURE — 72100 X-RAY EXAM L-S SPINE 2/3 VWS: CPT

## 2023-04-30 RX ORDER — METHOCARBAMOL 500 MG/1
500 TABLET, FILM COATED ORAL 4 TIMES DAILY
Status: DISCONTINUED | OUTPATIENT
Start: 2023-04-30 | End: 2023-05-01 | Stop reason: HOSPADM

## 2023-04-30 RX ADMIN — METHOCARBAMOL 500 MG: 500 TABLET, FILM COATED ORAL at 21:22

## 2023-04-30 RX ADMIN — OXYCODONE HYDROCHLORIDE 2.5 MG: 5 TABLET ORAL at 19:28

## 2023-04-30 RX ADMIN — MONTELUKAST 10 MG: 10 TABLET, FILM COATED ORAL at 21:22

## 2023-04-30 RX ADMIN — ACETAMINOPHEN 975 MG: 325 TABLET ORAL at 16:16

## 2023-04-30 RX ADMIN — Medication 37.5 MG: at 09:43

## 2023-04-30 RX ADMIN — FLUTICASONE FUROATE 1 PUFF: 100 POWDER RESPIRATORY (INHALATION) at 09:43

## 2023-04-30 RX ADMIN — FUROSEMIDE 20 MG: 20 TABLET ORAL at 09:42

## 2023-04-30 RX ADMIN — APIXABAN 5 MG: 5 TABLET, FILM COATED ORAL at 19:57

## 2023-04-30 RX ADMIN — ACETAMINOPHEN 975 MG: 325 TABLET ORAL at 00:10

## 2023-04-30 RX ADMIN — TRAZODONE HYDROCHLORIDE 50 MG: 50 TABLET ORAL at 21:22

## 2023-04-30 RX ADMIN — Medication 25 MCG: at 09:44

## 2023-04-30 RX ADMIN — GABAPENTIN 100 MG: 100 CAPSULE ORAL at 09:42

## 2023-04-30 RX ADMIN — ACETAMINOPHEN 975 MG: 325 TABLET ORAL at 07:57

## 2023-04-30 RX ADMIN — LIDOCAINE 2 PATCH: 4 PATCH TOPICAL at 09:42

## 2023-04-30 RX ADMIN — SENNOSIDES AND DOCUSATE SODIUM 1 TABLET: 50; 8.6 TABLET ORAL at 09:43

## 2023-04-30 RX ADMIN — METHOCARBAMOL 500 MG: 500 TABLET, FILM COATED ORAL at 18:09

## 2023-04-30 RX ADMIN — OXYCODONE HYDROCHLORIDE 2.5 MG: 5 TABLET ORAL at 15:36

## 2023-04-30 RX ADMIN — OXYCODONE HYDROCHLORIDE 2.5 MG: 5 TABLET ORAL at 07:57

## 2023-04-30 RX ADMIN — APIXABAN 5 MG: 5 TABLET, FILM COATED ORAL at 09:41

## 2023-04-30 RX ADMIN — Medication 37.5 MG: at 19:57

## 2023-04-30 RX ADMIN — Medication 1 TABLET: at 09:41

## 2023-04-30 RX ADMIN — ATORVASTATIN CALCIUM 20 MG: 10 TABLET, FILM COATED ORAL at 21:22

## 2023-04-30 RX ADMIN — GABAPENTIN 100 MG: 100 CAPSULE ORAL at 15:30

## 2023-04-30 RX ADMIN — GABAPENTIN 100 MG: 100 CAPSULE ORAL at 19:57

## 2023-04-30 ASSESSMENT — ACTIVITIES OF DAILY LIVING (ADL)
ADLS_ACUITY_SCORE: 43

## 2023-04-30 NOTE — PLAN OF CARE
Problem: Plan of Care - These are the overarching goals to be used throughout the patient stay.    Goal: Plan of Care Review  Description: The Plan of Care Review/Shift note should be completed every shift.  The Outcome Evaluation is a brief statement about your assessment that the patient is improving, declining, or no change.  This information will be displayed automatically on your shift note.  Outcome: Progressing     Problem: Orthopaedic Fracture  Goal: Effective Bowel Elimination  Outcome: Progressing   Goal Outcome Evaluation:               Pt had restful night,scheduled tylenol effective for pain control,calm and cooperative with cares.

## 2023-04-30 NOTE — PROGRESS NOTES
Care Management Follow Up    Length of Stay (days): 1    Expected Discharge Date: 05/01/2023     Concerns to be Addressed: discharge planning     Patient plan of care discussed at interdisciplinary rounds: Yes    Anticipated Discharge Disposition: Transitional Care     Anticipated Discharge Services: None  Anticipated Discharge DME: None    Patient/family educated on Medicare website which has current facility and service quality ratings: yes  Education Provided on the Discharge Plan: Yes  Patient/Family in Agreement with the Plan: yes    Referrals Placed by CM/SW: Post Acute Facilities  Private pay costs discussed: Not applicable    Additional Information:  SW notified by pts RN that pt and daughter had discussed possible need for TCU further and were now in agreement with recommendation for TCU. SW met with pt and pts daughter, Genesis, in pts room. Pt alert to conversation but most information provided by Genesis. Genesis reports that the pt has decided that she does need TCU and family are in agreement with this. SW provided education on the TCU process and provided TCU list to review for options. SW requested that they provide 4-5 options for placement. They will review and update SW with choices.      [facility] Randall [facility]    Addendum: SW received call back from pts daughter Genesis. She requests referrals to 1. [facility] 2. [facility] 3. [facility]. She reports that they want a private room for the pt. She reprots she can be reached for updates at [phone]

## 2023-04-30 NOTE — PROGRESS NOTES
Charti review. Waiting XR lumbar to evaluate stability of mild L2 fracture, acute on chronic mild L4 fracture. If fractures unchanged, would continue brace for comfort without need for follow up in clinic, unless back pain worsens or does not improve. Discussed with medicine today.     ADDENDUM 1430; Lumbar XR reviewed. No change in spinal alignment or fractures therefore no need to follow up in clinic unless back pain worsens. Cervical XR reviewed. No acute injury.     KATIE Torrse  [facility]  O: [phone]

## 2023-04-30 NOTE — PROGRESS NOTES
Patient alert, oriented. Up in chair a good part of the day and also down for CT and x-rays--tolerated well and resting in bed. Daughter here. Patient medicated for pain with scheduled tylenol and robaxin as well as prn oxy. Back pain is well controlled, most of pain is in left side of her neck and upper shoulder and left foot. Will monitor.

## 2023-04-30 NOTE — PROGRESS NOTES
[facility]    Medicine Progress Note - Hospitalist Service    Date of Admission:  4/27/2023    Assessment & Plan   Tia Brooke is a 89 year old female with medical history of COPD and chronic atrial fibrillation on anticoagulation, admitted on 4/27/2023 after a mechanical fall, resulting in a scalp laceration, acute superior endplate compression fracture of L2 and progression of previous L4 compression fracture.    #L2 and L4 compression fractures: No significant low back pain   - Neurosurgical/spine consultation appreciated; Aiken corset for comfort  - Pain management: Scheduled APAP 975 mg every 8 hours, oxycodone 2.5 to 5 mg every 4 hours as needed for moderate to severe pain.  Continue PTA gabapentin 100 mg 3 times daily  - PT/OT eval, will need TCU  - Follow-up with neurosurgery in 4 weeks with repeat x-ray if pain persists    # Neck pain.  CT of the cervical spine does not show any fracture or subluxation.    -Scheduled APAP and as needed oxycodone as above.  Will schedule Robaxin 4 times daily as patient is having severe muscle spasms.  - ice prn     #Left foot pain -x-ray negative for fracture.  -Continue bilateral compression stocking for edema    #Scalp laceration s/p repair in the ER  - Local wound care.    #GERMAIN -renal function improved after IV fluids, creatinine 0.98 from 1.38  -Encourage oral hydration    #Mechanical fall:  - Fall precautions.    #Chronic atrial fibrillation  -Continue metoprolol for ventricular rate control with holding parameters  -On Eliquis for CVA prevention    #Hyperlipidemia on statin       Diet: Combination Diet Regular Diet Adult    DVT Prophylaxis: DOAC  Walsh Catheter: Not present  Lines: None     Cardiac Monitoring: None  Code Status: No CPR- Do NOT Intubate      Clinically Significant Risk Factors                        # Obesity: Estimated body mass index is 32.81 kg/m  as calculated from the following:    Height as of this encounter:  "1.473 m (4' 10\").    Weight as of this encounter: 71.2 kg (157 lb)., PRESENT ON ADMISSION         Disposition Plan     Expected Discharge Date: 05/01/2023    Discharge Delays: Placement - Homecare  Destination: other (comment) (TCU)            Miguelina Casillas MD  Hospitalist Service  [facility]  Securely message with [employer] (more info)  Text page via [employer] Paging/Directory   ______________________________________________________________________    Interval History   Complains of severe neck pain which feels slightly worse than yesterday.  No significant low back pain.  No tingling or numbness in the arms or legs.  Reports having pain in both legs below the knees left more than right.  Does not feel like she can participate in PT/OT at this time due to pain.  Has good oral intake.  Had BM yesterday    Physical Exam   Vital Signs: Temp: 98.8  F (37.1  C) Temp src: Oral BP: 129/60 Pulse: 57   Resp: 18 SpO2: 100 % O2 Device: Nasal cannula Oxygen Delivery: 2 LPM  Weight: 157 lbs 0 oz    General: Very uncomfortable due to pain   HEENT: Closed occipital scalp laceration  CV: Regular.  Normal S1-S2  Lungs: Clear  Abdomen: Soft and nontender  Extremities: Bilateral edema, compression stockings are   neuro: Awake and alert, answers appropriately, moves all extremities    Medical Decision Making       40 MINUTES SPENT BY ME on the date of service doing chart review, history, exam, documentation & further activities per the note.  MANAGEMENT DISCUSSED with the following over the past 24 hours: Patient, family at bedside, nursing staff, neurosurgery       Data         Imaging results reviewed over the past 24 hrs:   Recent Results (from the past 24 hour(s))   XR Foot Left G/E 3 Views    Narrative    EXAM: XR FOOT LEFT G/E 3 VIEWS  LOCATION: [facility]  DATE/TIME: 4/30/2023 1:24 PM CDT    INDICATION: Severe pain, fell 3 days ago, rule out fracture  COMPARISON: None.      " Impression    IMPRESSION: Normal alignment. No acute fracture. Mild degenerative arthritis of the first MTP joint. Soft tissue swelling involving the distal forefoot and toes.   XR Lumbar Spine 2/3 Views    Narrative    EXAM: XR LUMBAR SPINE 2/3 VIEWS  LOCATION: [facility]  DATE/TIME: 4/30/2023 1:25 PM CDT    INDICATION: upright xray ap and lateral L2 fracture  COMPARISON: Lumbar spine CT 04/27/2023      Impression    IMPRESSION:   5 lumbar-type vertebral bodies. Previously described superior plate of L2 fracture is not well appreciated by radiographs. Superior endplate L3 compression fracture, unchanged. No other evidence of compression fracture within the lumbar spine.   Retrolisthesis of L1 on L2 measuring 2 mm. The vertebral bodies of the lumbar spine otherwise have normal stature and alignment without evidence of compression fracture. Anterior marginal osteophytes at L1/L2 - L3/L4. Multilevel degenerative disc disease   of the lumbar spine with disc height loss, most pronounced at L3/L4 and L4/L5. Soft tissues otherwise unremarkable.   CT Cervical Spine w/o Contrast    Narrative    EXAM: CT CERVICAL SPINE W/O CONTRAST  LOCATION: [facility]  DATE/TIME: 4/30/2023 1:32 PM CDT    INDICATION: Fall 3 days ago, severe neck pain; Neck pain; Trauma; Spondyloarthropathy  COMPARISON: 06/06/2016.  TECHNIQUE: Routine CT Cervical Spine without IV contrast. Multiplanar reformats. Dose reduction techniques were used.    FINDINGS:  VERTEBRA: No acute fracture or posttraumatic subluxation. Mild degenerative anterolisthesis at C6-C7 measuring 2 mm. Mild degenerative anterolisthesis at C7-T1 and T1-T2 measuring 2 mm. Vertebral body heights are maintained. No aggressive osseous   abnormality. Multilevel cervical spondylosis.    CANAL/FORAMINA: No high-grade spinal canal or neural foraminal stenosis.    PARASPINAL: No extraspinal abnormality.      Impression    IMPRESSION:  1.  No  fracture or posttraumatic subluxation.  2.  No high-grade spinal canal or neural foraminal stenosis.

## 2023-05-01 ENCOUNTER — APPOINTMENT (OUTPATIENT)
Dept: PHYSICAL THERAPY | Facility: HOSPITAL | Age: 88
DRG: 552 | End: 2023-05-01
Payer: MEDICARE

## 2023-05-01 VITALS
TEMPERATURE: 98.8 F | HEART RATE: 58 BPM | RESPIRATION RATE: 16 BRPM | OXYGEN SATURATION: 93 % | WEIGHT: 157 LBS | BODY MASS INDEX: 32.95 KG/M2 | DIASTOLIC BLOOD PRESSURE: 52 MMHG | SYSTOLIC BLOOD PRESSURE: 108 MMHG | HEIGHT: 58 IN

## 2023-05-01 PROCEDURE — 97535 SELF CARE MNGMENT TRAINING: CPT | Mod: GP

## 2023-05-01 PROCEDURE — 250N000013 HC RX MED GY IP 250 OP 250 PS 637: Performed by: INTERNAL MEDICINE

## 2023-05-01 PROCEDURE — 99239 HOSP IP/OBS DSCHRG MGMT >30: CPT | Performed by: EMERGENCY MEDICINE

## 2023-05-01 RX ORDER — OXYCODONE HYDROCHLORIDE 5 MG/1
5 TABLET ORAL EVERY 4 HOURS PRN
Qty: 20 TABLET | Refills: 0 | Status: SHIPPED | OUTPATIENT
Start: 2023-05-01

## 2023-05-01 RX ORDER — ACETAMINOPHEN 325 MG/1
975 TABLET ORAL EVERY 8 HOURS
Qty: 90 TABLET | Refills: 0 | Status: SHIPPED | OUTPATIENT
Start: 2023-05-01

## 2023-05-01 RX ORDER — AMOXICILLIN 250 MG
1 CAPSULE ORAL 2 TIMES DAILY
Qty: 60 TABLET | Refills: 0 | Status: SHIPPED | OUTPATIENT
Start: 2023-05-01

## 2023-05-01 RX ORDER — METHOCARBAMOL 500 MG/1
500 TABLET, FILM COATED ORAL 4 TIMES DAILY PRN
Qty: 30 TABLET | Refills: 0 | Status: SHIPPED | OUTPATIENT
Start: 2023-05-01

## 2023-05-01 RX ORDER — LIDOCAINE 4 G/G
2 PATCH TOPICAL EVERY 24 HOURS
Qty: 14 PATCH | Refills: 0 | Status: SHIPPED | OUTPATIENT
Start: 2023-05-01

## 2023-05-01 RX ADMIN — GABAPENTIN 100 MG: 100 CAPSULE ORAL at 08:38

## 2023-05-01 RX ADMIN — Medication 25 MCG: at 08:40

## 2023-05-01 RX ADMIN — METHOCARBAMOL 500 MG: 500 TABLET, FILM COATED ORAL at 13:32

## 2023-05-01 RX ADMIN — GABAPENTIN 100 MG: 100 CAPSULE ORAL at 13:32

## 2023-05-01 RX ADMIN — Medication 1 TABLET: at 08:39

## 2023-05-01 RX ADMIN — FUROSEMIDE 20 MG: 20 TABLET ORAL at 08:40

## 2023-05-01 RX ADMIN — FLUTICASONE FUROATE 1 PUFF: 100 POWDER RESPIRATORY (INHALATION) at 08:51

## 2023-05-01 RX ADMIN — Medication 1 CAPSULE: at 08:38

## 2023-05-01 RX ADMIN — DOCUSATE SODIUM 50 MG AND SENNOSIDES 8.6 MG 2 TABLET: 8.6; 5 TABLET, FILM COATED ORAL at 08:39

## 2023-05-01 RX ADMIN — APIXABAN 5 MG: 5 TABLET, FILM COATED ORAL at 08:39

## 2023-05-01 RX ADMIN — METHOCARBAMOL 500 MG: 500 TABLET, FILM COATED ORAL at 08:38

## 2023-05-01 RX ADMIN — ACETAMINOPHEN 975 MG: 325 TABLET ORAL at 00:57

## 2023-05-01 RX ADMIN — ACETAMINOPHEN 975 MG: 325 TABLET ORAL at 08:38

## 2023-05-01 RX ADMIN — LIDOCAINE 2 PATCH: 4 PATCH TOPICAL at 08:52

## 2023-05-01 RX ADMIN — OXYCODONE HYDROCHLORIDE 2.5 MG: 5 TABLET ORAL at 06:06

## 2023-05-01 ASSESSMENT — ACTIVITIES OF DAILY LIVING (ADL)
ADLS_ACUITY_SCORE: 43
DRESSING/BATHING_DIFFICULTY: NO
ADLS_ACUITY_SCORE: 36
DOING_ERRANDS_INDEPENDENTLY_DIFFICULTY: NO
EQUIPMENT_CURRENTLY_USED_AT_HOME: WALKER, ROLLING
CONCENTRATING,_REMEMBERING_OR_MAKING_DECISIONS_DIFFICULTY: NO
ADLS_ACUITY_SCORE: 43
CHANGE_IN_FUNCTIONAL_STATUS_SINCE_ONSET_OF_CURRENT_ILLNESS/INJURY: YES
WEAR_GLASSES_OR_BLIND: YES
TOILETING_ISSUES: NO
VISION_MANAGEMENT: GLASSES
FALL_HISTORY_WITHIN_LAST_SIX_MONTHS: YES
ADLS_ACUITY_SCORE: 36
ADLS_ACUITY_SCORE: 43
ADLS_ACUITY_SCORE: 43
NUMBER_OF_TIMES_PATIENT_HAS_FALLEN_WITHIN_LAST_SIX_MONTHS: 1
WALKING_OR_CLIMBING_STAIRS_DIFFICULTY: NO
DIFFICULTY_EATING/SWALLOWING: NO
ADLS_ACUITY_SCORE: 43

## 2023-05-01 NOTE — PLAN OF CARE
Physical Therapy Discharge Summary    Reason for therapy discharge:    Discharged to TCU.    Progress towards therapy goal(s). See goals on Care Plan in [facility] electronic health record for goal details.  Goals partially met.  Barriers to achieving goals:   discharge from facility.    Therapy recommendation(s):    Further recommended therapy is related to documented deficits, and is necessary to maximize functional independence in order for patient to return to prior level of function.      Isi Santos, RANJITT 5/1/2023

## 2023-05-01 NOTE — PROGRESS NOTES
S: Pt is a 89 yof who was seen at [facility], room 428, for delivery of an Apsen quik draw Rap LSO prescribed by Dr. Casillas. Pt was accompanied by her daughter.  DX: L2, L4 compression fxs.    O: 4'10 . 157 lbs. Pt had a fall and suffered L2 and L4 compression fxs.    A: Waist measurement taken of patient and a size Large Apsen quik draw Rap LSO was provided to the patient. Donning/doffing and wear/care instructions were discussed with the patient and her daughter. Written instructions were also delivered to the patient. LSO will be donned onto pt when staff is ready to get her up and moving.    P: Pt to follow-up as needed. All questions were answered at this time.    G: Provide hydrostatic compression to stabilize fx site and relieve lumbar pain.    Electronically signed by Ally Delacruz, Board Eligible Prosthetist Orthotist

## 2023-05-01 NOTE — PROGRESS NOTES
Care Management Discharge Note    Discharge Date: 05/01/2023       Discharge Disposition: Transitional Care    Discharge Services: TCU     Discharge DME: None    Discharge Transportation: health plan transportation    Private pay costs discussed: Not applicable    Does the patient's insurance plan have a 3 day qualifying hospital stay waiver?  Yes   Will the waiver be used for post-acute placement? Yes    Patient/family educated on Medicare website which has current facility and service quality ratings: yes    PAS: [id number]    Education Provided on the Discharge Plan: Yes  Persons Notified of Discharge Plans: Yes  Patient/Family in Agreement with the Plan: yes    Handoff Referral Completed: Yes    Additional Information:    Final discharge plan is for pt to go to [facility] TCU today 5/1. [facility] transport set up for a window of 2:07pm-2:52pm today 5/1. Family is agreeable to cost.    Facility, bedside, pt, family, and provider notified.       PAS Completed  Orders faxed        Yessenia Hernandez RN

## 2023-05-01 NOTE — PLAN OF CARE
Occupational Therapy Discharge Summary    Reason for therapy discharge:    Discharged to transitional care facility.    Progress towards therapy goal(s). See goals on Care Plan in [facility] electronic health record for goal details.  Goals not met.  Barriers to achieving goals:   discharge from facility.    Therapy recommendation(s):    Continued therapy is recommended.  Rationale/Recommendations:  not at baseline for BADLs.

## 2023-05-01 NOTE — PLAN OF CARE
Problem: Orthopaedic Fracture  Goal: Optimal Functional Ability  Outcome: Progressing     Problem: Orthopaedic Fracture  Goal: Optimal Pain Control and Function  Outcome: Progressing   Goal Outcome Evaluation:  Pt has a quiet night.  Slept between cares.  Purewick used overnight for voiding.  Pt rating pain 7-9/10 and received tylenol and oxycodone.  Pt stating pain is mostly on top of right foot r/t lymphedema wraps.  Pt requested to have wraps removed at the start of the shift and wore the socks throughout the night.  This am pt still have pain in right foot and requested to have soft socks removed.  Pt alert and oriented with some hearing loss.

## 2023-05-01 NOTE — PLAN OF CARE
Lymph Discharge Summary    Reason for therapy discharge:    Discharged to transitional care facility.    Progress towards therapy goal(s). See goals on Care Plan in [facility] electronic health record for goal details.  Goals not met.  Barriers to achieving goals:   limited tolerance for therapy and discharge from facility.    Therapy recommendation(s):    Continued therapy is recommended.  Rationale/Recommendations:  at TCU.

## 2023-05-01 NOTE — PLAN OF CARE
Problem: Plan of Care - These are the overarching goals to be used throughout the patient stay.    Goal: Absence of Hospital-Acquired Illness or Injury  Intervention: Identify and Manage Fall Risk  Recent Flowsheet Documentation  Taken 5/1/2023 0838 by Rojas Kat RN  Safety Promotion/Fall Prevention:   activity supervised   lighting adjusted   mobility aid in reach   increase visualization of patient   increased rounding and observation   clutter free environment maintained   nonskid shoes/slippers when out of bed     Problem: Orthopaedic Fracture  Goal: Effective Bowel Elimination  Outcome: Progressing     Problem: Orthopaedic Fracture  Goal: Optimal Pain Control and Function  Outcome: Progressing  Intervention: Manage Acute Orthopaedic-Related Pain  Recent Flowsheet Documentation  Taken 5/1/2023 0838 by Rojas Kat RN  Pain Management Interventions:   medication (see MAR)   repositioned     Problem: Orthopaedic Fracture  Goal: Optimal Functional Ability  Outcome: Progressing    Patient is alert and orientated, able to make needs known. Pain managed with scheduled tylenol, methocarbamol and lidocaine patched. Neurologically intact. Corset brace on while out of bed.  Heavy assist of two to transfer to bedside recliner and commode. Positive for bowel movement this shift. Anticipated discharge later today to TCU.     Rojas Kat, BC

## 2023-05-01 NOTE — DISCHARGE SUMMARY
[facility] MEDICINE  DISCHARGE SUMMARY     Primary Care Physician: Sunil Brown  Admission Date: 4/27/2023   Discharge Provider: Sin Jimenez MD Discharge Date: 5/1/2023   Diet:   Active Diet and Nourishment Order   Procedures     Combination Diet Regular Diet Adult     Diet       Code Status: No CPR- Do NOT Intubate   Activity: DCACTIVITY: Activity as tolerated        Condition at Discharge: Stable     REASON FOR PRESENTATION(See Admission Note for Details)   Compression fracture    PRINCIPAL & ACTIVE DISCHARGE DIAGNOSES     Principal Problem:    Closed compression fracture of L2 lumbar vertebra, initial encounter (H)  Active Problems:    Fall, initial encounter    Laceration of scalp, initial encounter      PENDING LABS     Unresulted Labs Ordered in the Past 30 Days of this Admission     No orders found from 3/28/2023 to 4/28/2023.            PROCEDURES ( this hospitalization only)          RECOMMENDATIONS TO OUTPATIENT PROVIDER FOR F/U VISIT     Follow-up Appointments     Follow Up and recommended labs and tests      Follow up with Nursing home physician.  Assess pain control             {Additional follow-up instructions/to-do's for PCP    : Make sure staples removed as directed    DISPOSITION     Skilled Nursing Facility    SUMMARY OF HOSPITAL COURSE:      Tia Brooke is a 89 year old female with medical history of COPD and chronic atrial fibrillation on anticoagulation, admitted on 4/27/2023 after a mechanical fall, resulting in a scalp laceration, acute superior endplate compression fracture of L2 and progression of previous L4 compression fracture.  Pain is much better controlled with scheduled Tylenol and Robaxin.  Also on as needed oxycodone and also on scheduled Lidoderm patch.  Is medically stable for discharge to TCU today.  Robaxin was changed to as needed given the potential toxicity of continued scheduling but this appears to have been  helpful and should be given if needed.     #L2 and L4 compression fractures: Pain controlled  - Neurosurgical/spine consultation appreciated; Antoinette corset for comfort  - Pain management: Scheduled APAP 975 mg every 8 hours, oxycodone 2.5 to 5 mg every 4 hours as needed for moderate to severe pain.  Continue PTA gabapentin 100 mg 3 times daily  - Follow-up with neurosurgery in 4 weeks with repeat x-ray if pain persists     # Neck pain.  CT of the cervical spine does not show any fracture or subluxation.    -Scheduled APAP and as needed oxycodone as above.    Also started on scheduled Robaxin 4 times a day.  Pain is much better controlled today and she is stable for transfer to TCU.  We will change Robaxin to as needed.       #Left foot pain -x-ray negative for fracture.  -Continue bilateral compression stocking for edema     #Scalp laceration s/p repair in the ER  - Local wound care.     #GERMAIN -renal function improved after IV fluids, creatinine 0.98 from 1.38  -Encourage oral hydration     #Mechanical fall:  - Fall precautions.     #Chronic atrial fibrillation  -Continue metoprolol for ventricular rate control with holding parameters  -On Eliquis for CVA prevention     #Hyperlipidemia on statin    Discharge Medications with Med changes:     Current Discharge Medication List      START taking these medications    Details   acetaminophen (TYLENOL) 325 MG tablet Take 3 tablets (975 mg) by mouth every 8 hours  Qty: 90 tablet, Refills: 0    Associated Diagnoses: Closed compression fracture of L2 lumbar vertebra, initial encounter (H)      Lidocaine (LIDOCARE) 4 % Patch Place 2 patches onto the skin every 24 hours To prevent lidocaine toxicity, patient should be patch free for 12 hrs daily.  Qty: 14 patch, Refills: 0    Associated Diagnoses: Closed compression fracture of L2 lumbar vertebra, initial encounter (H)      methocarbamol (ROBAXIN) 500 MG tablet Take 1 tablet (500 mg) by mouth 4 times daily as needed for muscle  spasms or other (pain)  Qty: 30 tablet, Refills: 0    Associated Diagnoses: Closed compression fracture of L2 lumbar vertebra, initial encounter (H)      oxyCODONE (ROXICODONE) 5 MG tablet Take 1 tablet (5 mg) by mouth every 4 hours as needed for severe pain  Qty: 20 tablet, Refills: 0    Associated Diagnoses: Closed compression fracture of L2 lumbar vertebra, initial encounter (H)      senna-docusate (SENOKOT-S/PERICOLACE) 8.6-50 MG tablet Take 1 tablet by mouth 2 times daily  Qty: 60 tablet, Refills: 0    Associated Diagnoses: Closed compression fracture of L2 lumbar vertebra, initial encounter (H)         CONTINUE these medications which have NOT CHANGED    Details   albuterol (PROAIR HFA) 90 mcg/actuation inhaler [ALBUTEROL (PROAIR HFA) 90 MCG/ACTUATION INHALER] Inhale 2 puffs every 4 (four) hours as needed. 2 puffs every 4-6 hours as needed             apixaban ANTICOAGULANT (ELIQUIS) 5 MG tablet Take 5 mg by mouth 2 times daily      atorvastatin (LIPITOR) 20 MG tablet [ATORVASTATIN (LIPITOR) 20 MG TABLET] Take 20 mg by mouth at bedtime.             CALCIUM CARBONATE/VITAMIN D3 (CALCIUM 600 + D,3, ORAL) Take 1 tablet by mouth every morning       cholecalciferol, vitamin D3, 1,000 unit tablet Take 1,000 Units by mouth every morning       fluticasone (ARNUITY ELLIPTA) 100 MCG/ACT inhaler Inhale 1 puff into the lungs every morning      furosemide (LASIX) 20 MG tablet Take 20 mg by mouth every morning      gabapentin (NEURONTIN) 100 MG capsule Take 100 mg by mouth 3 times daily      GLUC DELGADO/CHONDRO DELGADO A/VIT C/MN (GLUCOSAMINE 1500 COMPLEX ORAL) Take 1 tablet by mouth every morning       ipratropium-albuterol (DUO-NEB) 0.5-2.5 mg/3 mL nebulizer [IPRATROPIUM-ALBUTEROL (DUO-NEB) 0.5-2.5 MG/3 ML NEBULIZER] Inhale 3 mL every 6 (six) hours as needed.  Qty: 40 vial, Refills: 1    Associated Diagnoses: Exacerbation of persistent asthma, unspecified asthma severity      metoprolol tartrate (LOPRESSOR) 25 MG tablet Take 1  tablet (25 mg) by mouth 2 times daily  Qty: 60 tablet, Refills: 0    Associated Diagnoses: Paroxysmal atrial fibrillation (H)      montelukast (SINGULAIR) 10 mg tablet [MONTELUKAST (SINGULAIR) 10 MG TABLET] Take 10 mg by mouth bedtime.       multivitamin (OCUVITE) TABS tablet Take 1 tablet by mouth daily      omega-3 fatty acids-fish oil (FISH OIL) 360-1,200 mg cap Take 1 capsule by mouth every morning       traZODone (DESYREL) 50 MG tablet Take 50 mg by mouth At Bedtime         STOP taking these medications       HYDROcodone-acetaminophen (NORCO) 5-325 MG tablet Comments:   Reason for Stopping:                     Rationale for medication changes:      See summary        Consults       NEUROSURGERY IP CONSULT  CARE MANAGEMENT / SOCIAL WORK IP CONSULT  PHYSICAL THERAPY ADULT IP CONSULT  OCCUPATIONAL THERAPY ADULT IP CONSULT  ORTHOSIS BRACE IP CONSULT  LYMPHEDEMA THERAPY IP CONSULT  INTEGRATIVE THERAPIES CONSULT  PHYSICAL THERAPY ADULT IP CONSULT  OCCUPATIONAL THERAPY ADULT IP CONSULT    Immunizations given this encounter     Most Recent Immunizations   Administered Date(s) Administered     COVID-19 Bivalent 18+ (Moderna) 10/10/2022     COVID-19 Vaccine (Jim) 03/05/2021     DT (PEDS <7y) 12/03/1996     Influenza Vaccine >6 months (Alfuria,Fluzone) 10/08/2013     Influenza Vaccine, 6+MO IM (QUADRIVALENT W/PRESERVATIVES) 10/16/2012     Pneumo Conj 13-V (2010&after) 04/08/2015     Pneumococcal 23 valent 09/25/2003     TD,PF 7+ (Tenivac) 04/27/2023     TDAP (Adacel,Boostrix) 06/05/2016     Td,adult,historic,unspecified 11/02/2007           Anticoagulation Information      Recent INR results:   Recent Labs   Lab 04/27/23  1533   INR 1.24*     Warfarin doses (if applicable) or name of other anticoagulant:       SIGNIFICANT IMAGING FINDINGS     Results for orders placed or performed during the hospital encounter of 04/27/23   Head CT w/o contrast    Impression    IMPRESSION:  1.  No acute traumatic intracranial  abnormality.   2.  Posterior parieto-occipital scalp soft tissue injury without displaced calvarial fracture.       XR Hip Bilateral 1 View Each    Impression    IMPRESSION: Right total hip arthroplasty. No fracture or dislocation. Nothing to suggest loosening. Advanced degenerative changes in the left hip.   Lumbar spine CT w/o contrast    Impression    IMPRESSION:  1.  Acute minimally displaced fracture through the anterior superior endplate of L2 extending into a left marginal osteophyte.  2.  Compression fracture of L4 with mild-moderate approximately 30% height loss, increased since comparison MRI 06/27/2013.  3.  Diffuse osseous demineralization, which can limit CT assessment for subtle fractures.  4.  Mild lower lumbar levocurvature, grade 1 stepwise retrolisthesis at L1-L2 and L2-L3, and multilevel spondylosis including moderate-severe spinal canal stenosis at L3-L4.       XR Lumbar Spine 2/3 Views    Impression    IMPRESSION:   5 lumbar-type vertebral bodies. Previously described superior plate of L2 fracture is not well appreciated by radiographs. Superior endplate L3 compression fracture, unchanged. No other evidence of compression fracture within the lumbar spine.   Retrolisthesis of L1 on L2 measuring 2 mm. The vertebral bodies of the lumbar spine otherwise have normal stature and alignment without evidence of compression fracture. Anterior marginal osteophytes at L1/L2 - L3/L4. Multilevel degenerative disc disease   of the lumbar spine with disc height loss, most pronounced at L3/L4 and L4/L5. Soft tissues otherwise unremarkable.   CT Cervical Spine w/o Contrast    Impression    IMPRESSION:  1.  No fracture or posttraumatic subluxation.  2.  No high-grade spinal canal or neural foraminal stenosis.   XR Foot Left G/E 3 Views    Impression    IMPRESSION: Normal alignment. No acute fracture. Mild degenerative arthritis of the first MTP joint. Soft tissue swelling involving the distal forefoot and toes.        SIGNIFICANT LABORATORY FINDINGS     Most Recent 3 BMP's:Recent Labs   Lab Test 04/28/23  0735 04/27/23  1533 09/12/21  0831 09/11/21  0720    140  --  142   POTASSIUM 4.3 4.1 3.9 3.6   CHLORIDE 105 101  --  104   CO2 28 30*  --  29   BUN 20.8 23.8*  --  20   CR 0.98* 1.38*  --  0.85   ANIONGAP 7 9  --  9   JUAN FRANCISCO 8.9 9.8*  --  9.0   GLC 98 124*  --  105           Discharge Orders        General info for SNF    Length of Stay Estimate: Short Term Care: Estimated # of Days <30  Condition at Discharge: Improving  Level of care:skilled   Rehabilitation Potential: Good  Admission H&P remains valid and up-to-date: Yes  Recent Chemotherapy: N/A  Use Nursing Home Standing Orders: Yes     Mantoux instructions    Give two-step Mantoux (PPD) Per Facility Policy Yes     Follow Up and recommended labs and tests    Follow up with Nursing home physician.  Assess pain control     Reason for your hospital stay    Compression fracture     Activity - Up with nursing assistance     No CPR- Do NOT Intubate     Physical Therapy Adult Consult    Evaluate and treat as clinically indicated.    Reason: Back pain     Occupational Therapy Adult Consult    Evaluate and treat as clinically indicated.    Reason: Back pain     Fall precautions     Diet    Follow this diet upon discharge: Orders Placed This Encounter      Combination Diet Regular Diet Adult       Examination   Physical Exam   Temp:  [97.3  F (36.3  C)-98.8  F (37.1  C)] 98.8  F (37.1  C)  Pulse:  [58-71] 58  Resp:  [16-20] 16  BP: (102-138)/(44-56) 108/52  SpO2:  [92 %-93 %] 93 %  Wt Readings from Last 1 Encounters:   04/27/23 71.2 kg (157 lb)       General: No apparent distress, sitting up in chair, pain much better controlled.  Her daughter is present also.  Feels medically stable for transfer to TCU.  Heart: Regular rate and rhythm  Lungs: Clear to auscultation  Abdomen: Soft nontender      Please see EMR for more detailed significant labs, imaging, consultant notes  etc.    I, Sin Jimenez MD, personally saw the patient today and spent greater than 30 minutes discharging this patient.    Sin Jimenez MD  [facility]    CC:Sunil Brown

## 2023-05-02 ENCOUNTER — PATIENT OUTREACH (OUTPATIENT)
Dept: CARE COORDINATION | Facility: CLINIC | Age: 88
End: 2023-05-02
Payer: MEDICARE

## 2023-05-02 NOTE — PROGRESS NOTES
[facility]    Background: Transitional Care Management program identified per system criteria and reviewed by [facility] team for possible outreach.    Assessment: Upon chart review, [facility] Team member will not proceed with patient outreach related to this episode of Transitional Care Management program due to reason below:    Non-[facility] TCU: [facility] team member noted patient discharged to TCU/ARU/LTACH. Patient is not established with a [facility] currently supported by Primary Care-Care Coordination therefore handoff to Primary Care-Care Coordination is not appropriate at this time.    Plan: Transitional Care Management episode addressed appropriately per reason noted above.              CHRISTI Martines  [facility] Resource [place], [facility]    *Connected Care Resource Team does NOT follow patient ongoing. Referrals are identified based on internal discharge reports and the outreach is to ensure patient has an understanding of their discharge instructions.

## 2023-05-02 NOTE — PROGRESS NOTES
[facility] GERIATRIC SERVICES  Chief Complaint   Patient presents with     [facility] F/U     [place] Medical Record Number:  [id number]  Place of Service where encounter took place:  [facility] ([facility]) [[id number]]  Code Status:  No CPR- Do NOT Intubate     HISTORY:      HPI:  Tia Brooke  is 89 year old (3/21/1932) undergoing physical and occupational therapy. She is with medical history of COPD and chronic atrial fibrillation on anticoagulation, admitted on 4/27/2023 after a mechanical fall, resulting in a scalp laceration, acute superior endplate compression fracture of L2 and progression of previous L4 compression fracture.      Today she was seen to review vital signs, labs, routine visit and to establish care.  She denied chest pain shortness of breath cough congestion constipation or diarrhea.  Her pain is controlled with current medications.  Her scalp staples have been removed she denies any numbness or tingling.  She is currently wearing an Aspen corset when out of bed.  Labs reviewed from 5/4/2023 last hemoglobin 10.3, magnesium level slightly elevated at 2.5 not on magnesium.     ALLERGIES:Patient has no known allergies.    PAST MEDICAL HISTORY: History reviewed. No pertinent past medical history.    PAST SURGICAL HISTORY:   has a past surgical history that includes Total Hip Arthroplasty (Right, 12/31/2009); Phacoemulsification clear cornea with standard intraocular lens implant (Bilateral); and Colectomy Partial (11/01/2004).    FAMILY HISTORY: family history includes Atrial fibrillation in her daughter; Chronic Obstructive Pulmonary Disease in her father; Heart Disease in her brother; Heart Failure in her mother; Hyperlipidemia in her daughter; Hypertension in her daughter; Leukemia in her mother.    SOCIAL HISTORY:  reports that she has never smoked. She has never used smokeless tobacco. She reports current alcohol use. She reports that she does not use drugs.    ROS:  Constitutional:  "Negative for activity change, appetite change, fatigue and fever.   HENT: Negative for congestion.    Respiratory: Negative for cough, shortness of breath and wheezing.    Cardiovascular: Negative for chest pain and leg swelling.   Gastrointestinal: Negative for abdominal distention, abdominal pain, constipation, diarrhea and nausea.   Genitourinary: Negative for dysuria.   Musculoskeletal: Negative for arthralgia.  Positive for back pain.   Skin: Negative for color change and wound.   Neurological: Negative for dizziness.   Psychiatric/Behavioral: Negative for agitation, behavioral problems and confusion.     Physical Exam:  Constitutional:       Appearance: Patient is well-developed.   HENT:      Head: Normocephalic.   Eyes:      Conjunctiva/sclera: Conjunctivae normal.   Neck:      Musculoskeletal: Normal range of motion.   Cardiovascular:      Rate and Rhythm: Normal rate and regular rhythm.      Heart sounds: Normal heart sounds. No murmur.   Pulmonary:      Effort: No respiratory distress.      Breath sounds: Normal breath sounds. No wheezing or rales.   Abdominal:      General: Bowel sounds are normal. There is no distension.      Palpations: Abdomen is soft.      Tenderness: There is no abdominal tenderness.   Musculoskeletal:       Normal range of motion.     Skin:General:        Skin is warm.   Neurological:         Mental Status: Patient is alert and oriented to person, place, and time.   Psychiatric:         Behavior: Behavior normal.     Vitals:/60   Pulse 65   Temp 97.6  F (36.4  C)   Resp 18   Ht 1.473 m (4' 10\")   Wt 72.6 kg (160 lb)   SpO2 95%   BMI 33.44 kg/m   and Body mass index is 33.44 kg/m .    Lab/Diagnostic data:   Recent Results (from the past 240 hour(s))   Basic metabolic panel    Collection Time: 04/27/23  3:33 PM   Result Value Ref Range    Sodium 140 136 - 145 mmol/L    Potassium 4.1 3.4 - 5.3 mmol/L    Chloride 101 98 - 107 mmol/L    Carbon Dioxide (CO2) 30 (H) 22 - 29 " mmol/L    Anion Gap 9 7 - 15 mmol/L    Urea Nitrogen 23.8 (H) 8.0 - 23.0 mg/dL    Creatinine 1.38 (H) 0.51 - 0.95 mg/dL    Calcium 9.8 (H) 8.2 - 9.6 mg/dL    Glucose 124 (H) 70 - 99 mg/dL    GFR Estimate 36 (L) >60 mL/min/1.73m2   Troponin T, High Sensitivity    Collection Time: 04/27/23  3:33 PM   Result Value Ref Range    Troponin T, High Sensitivity 40 (H) <=14 ng/L   Nt probnp inpatient (BNP)    Collection Time: 04/27/23  3:33 PM   Result Value Ref Range    N terminal Pro BNP Inpatient 348 0 - 1,800 pg/mL   INR    Collection Time: 04/27/23  3:33 PM   Result Value Ref Range    INR 1.24 (H) 0.85 - 1.15   Partial thromboplastin time    Collection Time: 04/27/23  3:33 PM   Result Value Ref Range    aPTT 29 22 - 38 Seconds   CBC with platelets and differential    Collection Time: 04/27/23  3:33 PM   Result Value Ref Range    WBC Count 10.4 4.0 - 11.0 10e3/uL    RBC Count 3.46 (L) 3.80 - 5.20 10e6/uL    Hemoglobin 11.5 (L) 11.7 - 15.7 g/dL    Hematocrit 35.3 35.0 - 47.0 %     (H) 78 - 100 fL    MCH 33.2 (H) 26.5 - 33.0 pg    MCHC 32.6 31.5 - 36.5 g/dL    RDW 12.9 10.0 - 15.0 %    Platelet Count 267 150 - 450 10e3/uL    % Neutrophils 47 %    % Lymphocytes 42 %    % Monocytes 7 %    % Eosinophils 3 %    % Basophils 0 %    % Immature Granulocytes 1 %    NRBCs per 100 WBC 0 <1 /100    Absolute Neutrophils 4.8 1.6 - 8.3 10e3/uL    Absolute Lymphocytes 4.4 0.8 - 5.3 10e3/uL    Absolute Monocytes 0.8 0.0 - 1.3 10e3/uL    Absolute Eosinophils 0.4 0.0 - 0.7 10e3/uL    Absolute Basophils 0.0 0.0 - 0.2 10e3/uL    Absolute Immature Granulocytes 0.1 <=0.4 10e3/uL    Absolute NRBCs 0.0 10e3/uL   ECG 12-LEAD WITH MUSE (E)    Collection Time: 04/27/23  3:33 PM   Result Value Ref Range    Systolic Blood Pressure 192 mmHg    Diastolic Blood Pressure 75 mmHg    Ventricular Rate 53 BPM    Atrial Rate 241 BPM    MN Interval  ms    QRS Duration 78 ms     ms    QTc 439 ms    P Axis  degrees    R AXIS 7 degrees    T Axis 59  degrees    Interpretation ECG       Junctional rhythm  Abnormal ECG  When compared with ECG of 11-SEP-2021 00:18,  Junctional rhythm has replaced Atrial fibrillation  Vent. rate has decreased BY  63 BPM  ST no longer depressed in Inferior leads  ST no longer depressed in Lateral leads  Nonspecific T wave abnormality no longer evident in Inferior leads  Confirmed by SEE ED PROVIDER NOTE FOR, ECG INTERPRETATION ([id number]),  HERB JACINTO ([id number]) on 4/28/2023 10:57:34 AM     Troponin T, High Sensitivity    Collection Time: 04/27/23  5:45 PM   Result Value Ref Range    Troponin T, High Sensitivity 41 (H) <=14 ng/L   Basic metabolic panel    Collection Time: 04/28/23  7:35 AM   Result Value Ref Range    Sodium 140 136 - 145 mmol/L    Potassium 4.3 3.4 - 5.3 mmol/L    Chloride 105 98 - 107 mmol/L    Carbon Dioxide (CO2) 28 22 - 29 mmol/L    Anion Gap 7 7 - 15 mmol/L    Urea Nitrogen 20.8 8.0 - 23.0 mg/dL    Creatinine 0.98 (H) 0.51 - 0.95 mg/dL    Calcium 8.9 8.2 - 9.6 mg/dL    Glucose 98 70 - 99 mg/dL    GFR Estimate 54 (L) >60 mL/min/1.73m2   CBC with platelets    Collection Time: 04/28/23  7:35 AM   Result Value Ref Range    WBC Count 8.6 4.0 - 11.0 10e3/uL    RBC Count 3.00 (L) 3.80 - 5.20 10e6/uL    Hemoglobin 10.0 (L) 11.7 - 15.7 g/dL    Hematocrit 30.5 (L) 35.0 - 47.0 %     (H) 78 - 100 fL    MCH 33.3 (H) 26.5 - 33.0 pg    MCHC 32.8 31.5 - 36.5 g/dL    RDW 12.9 10.0 - 15.0 %    Platelet Count 216 150 - 450 10e3/uL   Basic metabolic panel    Collection Time: 05/04/23  7:51 AM   Result Value Ref Range    Sodium 138 136 - 145 mmol/L    Potassium 3.9 3.4 - 5.3 mmol/L    Chloride 101 98 - 107 mmol/L    Carbon Dioxide (CO2) 26 22 - 29 mmol/L    Anion Gap 11 7 - 15 mmol/L    Urea Nitrogen 29.2 (H) 8.0 - 23.0 mg/dL    Creatinine 0.97 (H) 0.51 - 0.95 mg/dL    Calcium 9.0 8.2 - 9.6 mg/dL    Glucose 91 70 - 99 mg/dL    GFR Estimate 55 (L) >60 mL/min/1.73m2   Magnesium    Collection Time: 05/04/23  7:51 AM   Result  Value Ref Range    Magnesium 2.5 (H) 1.7 - 2.3 mg/dL   CBC with platelets and differential    Collection Time: 05/04/23  7:51 AM   Result Value Ref Range    WBC Count 9.7 4.0 - 11.0 10e3/uL    RBC Count 3.15 (L) 3.80 - 5.20 10e6/uL    Hemoglobin 10.3 (L) 11.7 - 15.7 g/dL    Hematocrit 32.8 (L) 35.0 - 47.0 %     (H) 78 - 100 fL    MCH 32.7 26.5 - 33.0 pg    MCHC 31.4 (L) 31.5 - 36.5 g/dL    RDW 12.8 10.0 - 15.0 %    Platelet Count 388 150 - 450 10e3/uL    % Neutrophils 49 %    % Lymphocytes 41 %    % Monocytes 5 %    % Eosinophils 4 %    % Basophils 0 %    % Immature Granulocytes 1 %    NRBCs per 100 WBC 0 <1 /100    Absolute Neutrophils 4.8 1.6 - 8.3 10e3/uL    Absolute Lymphocytes 4.0 0.8 - 5.3 10e3/uL    Absolute Monocytes 0.5 0.0 - 1.3 10e3/uL    Absolute Eosinophils 0.4 0.0 - 0.7 10e3/uL    Absolute Basophils 0.0 0.0 - 0.2 10e3/uL    Absolute Immature Granulocytes 0.1 <=0.4 10e3/uL    Absolute NRBCs 0.0 10e3/uL       MEDICATIONS:     Review of your medicines          Accurate as of May 3, 2023 11:59 PM. If you have any questions, ask your nurse or doctor.            CONTINUE these medicines which have NOT CHANGED      Dose / Directions   acetaminophen 325 MG tablet  Commonly known as: TYLENOL  Used for: Closed compression fracture of L2 lumbar vertebra, initial encounter (H)      Dose: 975 mg  Take 3 tablets (975 mg) by mouth every 8 hours  Quantity: 90 tablet  Refills: 0     albuterol 108 (90 Base) MCG/ACT inhaler  Commonly known as: PROAIR HFA/PROVENTIL HFA/VENTOLIN HFA      Dose: 2 puff  [ALBUTEROL (PROAIR HFA) 90 MCG/ACTUATION INHALER] Inhale 2 puffs every 4 (four) hours as needed. 2 puffs every 4-6 hours as needed  Refills: 0     apixaban ANTICOAGULANT 5 MG tablet  Commonly known as: ELIQUIS      Dose: 5 mg  Take 5 mg by mouth 2 times daily  Refills: 0     atorvastatin 20 MG tablet  Commonly known as: LIPITOR      Dose: 20 mg  [ATORVASTATIN (LIPITOR) 20 MG TABLET] Take 20 mg by mouth at  bedtime.  Refills: 0     CALCIUM-VITAMIN D PO      Dose: 1 tablet  Take 1 tablet by mouth every morning  Refills: 0     fluticasone 100 MCG/ACT inhaler  Commonly known as: ARNUITY ELLIPTA      Dose: 1 puff  Inhale 1 puff into the lungs every morning  Refills: 0     furosemide 20 MG tablet  Commonly known as: LASIX  Indication: Leg swelling      Dose: 20 mg  Take 20 mg by mouth every morning  Refills: 0     gabapentin 100 MG capsule  Commonly known as: NEURONTIN      Dose: 100 mg  Take 100 mg by mouth 3 times daily  Refills: 0     GLUCOSAMINE 1500 COMPLEX PO      Dose: 1 tablet  Take 1 tablet by mouth every morning  Refills: 0     ipratropium - albuterol 0.5 mg/2.5 mg/3 mL 0.5-2.5 (3) MG/3ML neb solution  Commonly known as: DUONEB  Used for: Exacerbation of persistent asthma, unspecified asthma severity      Dose: 3 mL  [IPRATROPIUM-ALBUTEROL (DUO-NEB) 0.5-2.5 MG/3 ML NEBULIZER] Inhale 3 mL every 6 (six) hours as needed.  Quantity: 40 vial  Refills: 1     Lidocaine 4 % Patch  Commonly known as: LIDOCARE  Used for: Closed compression fracture of L2 lumbar vertebra, initial encounter (H)      Dose: 2 patch  Place 2 patches onto the skin every 24 hours To prevent lidocaine toxicity, patient should be patch free for 12 hrs daily.  Quantity: 14 patch  Refills: 0     methocarbamol 500 MG tablet  Commonly known as: ROBAXIN  Used for: Closed compression fracture of L2 lumbar vertebra, initial encounter (H)      Dose: 500 mg  Take 1 tablet (500 mg) by mouth 4 times daily as needed for muscle spasms or other (pain)  Quantity: 30 tablet  Refills: 0     metoprolol tartrate 25 MG tablet  Commonly known as: LOPRESSOR  Used for: Paroxysmal atrial fibrillation (H)      Dose: 25 mg  Take 1 tablet (25 mg) by mouth 2 times daily  Quantity: 60 tablet  Refills: 0     montelukast 10 MG tablet  Commonly known as: SINGULAIR      Dose: 10 mg  [MONTELUKAST (SINGULAIR) 10 MG TABLET] Take 10 mg by mouth bedtime.  Refills: 0     multivitamin  Tabs tablet      Dose: 1 tablet  Take 1 tablet by mouth daily  Refills: 0     omega-3 fatty acids 1200 MG capsule      Dose: 1 capsule  Take 1 capsule by mouth every morning  Refills: 0     oxyCODONE 5 MG tablet  Commonly known as: ROXICODONE  Used for: Closed compression fracture of L2 lumbar vertebra, initial encounter (H)      Dose: 5 mg  Take 1 tablet (5 mg) by mouth every 4 hours as needed for severe pain  Quantity: 20 tablet  Refills: 0     senna-docusate 8.6-50 MG tablet  Commonly known as: SENOKOT-S/PERICOLACE  Used for: Closed compression fracture of L2 lumbar vertebra, initial encounter (H)      Dose: 1 tablet  Take 1 tablet by mouth 2 times daily  Quantity: 60 tablet  Refills: 0     traZODone 50 MG tablet  Commonly known as: DESYREL      Dose: 50 mg  Take 50 mg by mouth At Bedtime  Refills: 0     Vitamin D3 25 mcg (1000 units) tablet  Commonly known as: CHOLECALCIFEROL      Dose: 1,000 Units  Take 1,000 Units by mouth every morning  Refills: 0            ASSESSMENT/PLAN  Encounter Diagnoses   Name Primary?     Acute low back pain, unspecified back pain laterality, unspecified whether sciatica present Yes     Physical deconditioning      Closed compression fracture of L2 lumbar vertebra, initial encounter (H)      Laceration of scalp, initial encounter        Pain management Tylenol every 8 hours,lidocaine patch, Robaxin every 6 hours as needed, oxycodone 5 mg every 4 hours as needed    Shortness of breath wheezing albuterol inhaler every 4 hours as needed     atrial fibrillation on Eliquis 5 mg twice daily     HDL continue atorvastatin 20 mg at bedtime    COPD continue fluticasone inhaler daily, DuoNeb every 6 hours as needed    Leg swelling on 20 mg of Lasix daily    Nerve pain continue gabapentin    Hypertension on Metroprolol tartrate 25 mg twice daily    Mood disorder on trazodone    Electronically signed by: Maggi Stokes CNP

## 2023-05-03 ENCOUNTER — TRANSITIONAL CARE UNIT VISIT (OUTPATIENT)
Dept: GERIATRICS | Facility: CLINIC | Age: 88
End: 2023-05-03
Payer: MEDICARE

## 2023-05-03 ENCOUNTER — LAB REQUISITION (OUTPATIENT)
Dept: LAB | Facility: CLINIC | Age: 88
End: 2023-05-03

## 2023-05-03 VITALS
BODY MASS INDEX: 33.58 KG/M2 | HEART RATE: 65 BPM | HEIGHT: 58 IN | SYSTOLIC BLOOD PRESSURE: 135 MMHG | WEIGHT: 160 LBS | TEMPERATURE: 97.6 F | DIASTOLIC BLOOD PRESSURE: 60 MMHG | OXYGEN SATURATION: 95 % | RESPIRATION RATE: 18 BRPM

## 2023-05-03 DIAGNOSIS — S01.01XA LACERATION OF SCALP, INITIAL ENCOUNTER: ICD-10-CM

## 2023-05-03 DIAGNOSIS — Z51.81 ENCOUNTER FOR THERAPEUTIC DRUG LEVEL MONITORING: ICD-10-CM

## 2023-05-03 DIAGNOSIS — S32.020A CLOSED COMPRESSION FRACTURE OF L2 LUMBAR VERTEBRA, INITIAL ENCOUNTER (H): ICD-10-CM

## 2023-05-03 DIAGNOSIS — R53.81 PHYSICAL DECONDITIONING: ICD-10-CM

## 2023-05-03 DIAGNOSIS — M54.50 ACUTE LOW BACK PAIN, UNSPECIFIED BACK PAIN LATERALITY, UNSPECIFIED WHETHER SCIATICA PRESENT: Primary | Chronic | ICD-10-CM

## 2023-05-03 PROCEDURE — 99309 SBSQ NF CARE MODERATE MDM 30: CPT | Performed by: NURSE PRACTITIONER

## 2023-05-03 RX ORDER — HYDROCODONE BITARTRATE AND ACETAMINOPHEN 5; 325 MG/1; MG/1
1 TABLET ORAL EVERY 6 HOURS PRN
COMMUNITY
End: 2023-05-04

## 2023-05-03 NOTE — PROGRESS NOTES
[facility] GERIATRIC SERVICES       Patient Tia Brooke  MRN: [id number]        Reason for Visit     Chief Complaint   Patient presents with     RECHECK     INITIAL       Code Status     DNR only    Assessment     L 2 compression fracture  H/o of fall  COPD  Persistent Asthma  HTN  Atrial Fibrillation on AC (eliquis)  Generalized weakness    Plan     Pt is admitted to TCU for strengthening and rehab.    Patient admitted with gait instability, weakness and recurrent falls  Has L2 and L4 compression fractures: Non-surgical w/ Aspen corset for comfort  Pain management optimized using Tylenol/Robaxin/Lidocaine patches  - Discontinue Norco, Oxy prn  Lived in her own house independently in [place] prior to hospitalization/TCU, has family support.  Insomnia - Trazodone PRN  A. Fibb - on Eliquis, Metoprolol.    Discussed she may require placement pending rehabilitation, goal is to return home.    Recheck labs  Continue with PT/OT    History     Patient is a very pleasant 89 year old female who is admitted to TCU    Patient has a medical history of COPD and chronic atrial fibrillation on anticoagulation, admitted on 4/27/2023 after a mechanical fall, resulting in a scalp laceration, acute superior endplate compression fracture of L2 and progression of previous L4 compression fracture managed with Aspen Corset. Pain is much better controlled with scheduled Tylenol/Oxycodone/Norco and Robaxin. Feels she is progressing daily with therapies but is still requiring assistance for mobility/transfers/dressings.    Past Medical & Surgical History     PAST MEDICAL HISTORY: lymhedema  Vision impairment  PAST SURGICAL HISTORY:   has a past surgical history that includes Total Hip Arthroplasty (Right, 12/31/2009); Phacoemulsification clear cornea with standard intraocular lens implant (Bilateral); and Colectomy Partial (11/01/2004).      Past Social History     Reviewed,  reports that she has never smoked. She has never used  smokeless tobacco. She reports current alcohol use. She reports that she does not use drugs.    Family History     Reviewed, and family history includes Atrial fibrillation in her daughter; Chronic Obstructive Pulmonary Disease in her father; Heart Disease in her brother; Heart Failure in her mother; Hyperlipidemia in her daughter; Hypertension in her daughter; Leukemia in her mother.    Medication List     Current Outpatient Medications   Medication     acetaminophen (TYLENOL) 325 MG tablet     albuterol (PROAIR HFA) 90 mcg/actuation inhaler     apixaban ANTICOAGULANT (ELIQUIS) 5 MG tablet     atorvastatin (LIPITOR) 20 MG tablet     CALCIUM CARBONATE/VITAMIN D3 (CALCIUM 600 + D,3, ORAL)     cholecalciferol, vitamin D3, 1,000 unit tablet     fluticasone (ARNUITY ELLIPTA) 100 MCG/ACT inhaler     furosemide (LASIX) 20 MG tablet     gabapentin (NEURONTIN) 100 MG capsule     GLUC DELGADO/CHONDRO DELGADO A/VIT C/MN (GLUCOSAMINE 1500 COMPLEX ORAL)     ipratropium-albuterol (DUO-NEB) 0.5-2.5 mg/3 mL nebulizer     Lidocaine (LIDOCARE) 4 % Patch     methocarbamol (ROBAXIN) 500 MG tablet     metoprolol tartrate (LOPRESSOR) 25 MG tablet     montelukast (SINGULAIR) 10 mg tablet     omega-3 fatty acids-fish oil (FISH OIL) 360-1,200 mg cap     oxyCODONE (ROXICODONE) 5 MG tablet     senna-docusate (SENOKOT-S/PERICOLACE) 8.6-50 MG tablet     traZODone (DESYREL) 50 MG tablet     HYDROcodone-acetaminophen (NORCO) 5-325 MG tablet     No current facility-administered medications for this visit.      MED REC REQUIRED  Post Medication Reconciliation Status:         Allergies     No Known Allergies    Review of Systems   A comprehensive review of 14 systems was done. Pertinent findings noted here and in history of present illness. All the rest negative.  Constitutional: Negative.  Negative for fever, chills, appetite change and fatigue.   HENT: Negative for congestion, runny nose. [place]  Eyes: Negative for photophobia, redness and visual disturbance.  "  Has vision impairment  Respiratory: Negative for cough and chest tightness.    Cardiovascular: Negative for chest pain, palpitations.   Has chronic leg swelling  Gastrointestinal: Negative for nausea, diarrhea, constipation, blood in stool and abdominal discomfort.   Genitourinary: Negative.    Musculoskeletal: Bilateral lower extremity edema.    Has arthritis of knees  Skin: Negative.    Neurological: Negative for dizziness, tremors, syncope, weakness, light-headedness and headaches.   Hematological: Does not bruise/bleed easily.   Psychiatric/Behavioral: Negative.        Physical Exam   /50   Pulse 60   Temp 97.3  F (36.3  C)   Resp 20   Ht 1.473 m (4' 10\")   Wt 72.6 kg (160 lb)   SpO2 97%   BMI 33.44 kg/m       Constitutional: Oriented to person, place, and time and appears well-developed.   HEENT:  Normocephalic and atraumatic.  Eyes: Conjunctivae and EOM are normal. Pupils are equal, round, and reactive to light. No discharge.  No scleral icterus. Nose normal. Mouth/Throat: Oropharynx is clear and moist. No oropharyngeal exudate.    [place] and has hearing aids  Has vision impairment   NECK: Normal range of motion. Neck supple. No JVD present. No tracheal deviation present. No thyromegaly present.   CARDIOVASCULAR: Normal rate, regular rhythm and intact distal pulses.  Exam reveals no gallop and no friction rub.  Systolic murmur present.  PULMONARY: Effort normal and breath sounds normal. No respiratory distress.No Wheezing or rales.  ABDOMEN: Soft. Bowel sounds are normal. No distension and no mass.  There is no tenderness. There is no rebound and no guarding. No HSM.  MUSCULOSKELETAL: Normal range of motion. Mild kyphosis, l spine tenderness.  Has a brace  LYMPH NODES: Has no cervical, supraclavicular, axillary and groin adenopathy.   NEUROLOGICAL: Alert and oriented to person, place, and time. No cranial nerve deficit.  Normal muscle tone. Coordination normal.   GENITOURINARY: Deferred " exam.  SKIN: Skin is warm and dry. No rash noted. No erythema. No pallor.   EXTREMITIES: No cyanosis, no clubbing, 3+le edema. No Deformity.  PSYCHIATRIC: Normal mood, affect and behavior.      Lab Results     Recent Results (from the past 240 hour(s))   Basic metabolic panel    Collection Time: 04/27/23  3:33 PM   Result Value Ref Range    Sodium 140 136 - 145 mmol/L    Potassium 4.1 3.4 - 5.3 mmol/L    Chloride 101 98 - 107 mmol/L    Carbon Dioxide (CO2) 30 (H) 22 - 29 mmol/L    Anion Gap 9 7 - 15 mmol/L    Urea Nitrogen 23.8 (H) 8.0 - 23.0 mg/dL    Creatinine 1.38 (H) 0.51 - 0.95 mg/dL    Calcium 9.8 (H) 8.2 - 9.6 mg/dL    Glucose 124 (H) 70 - 99 mg/dL    GFR Estimate 36 (L) >60 mL/min/1.73m2   Troponin T, High Sensitivity    Collection Time: 04/27/23  3:33 PM   Result Value Ref Range    Troponin T, High Sensitivity 40 (H) <=14 ng/L   Nt probnp inpatient (BNP)    Collection Time: 04/27/23  3:33 PM   Result Value Ref Range    N terminal Pro BNP Inpatient 348 0 - 1,800 pg/mL   INR    Collection Time: 04/27/23  3:33 PM   Result Value Ref Range    INR 1.24 (H) 0.85 - 1.15   Partial thromboplastin time    Collection Time: 04/27/23  3:33 PM   Result Value Ref Range    aPTT 29 22 - 38 Seconds   CBC with platelets and differential    Collection Time: 04/27/23  3:33 PM   Result Value Ref Range    WBC Count 10.4 4.0 - 11.0 10e3/uL    RBC Count 3.46 (L) 3.80 - 5.20 10e6/uL    Hemoglobin 11.5 (L) 11.7 - 15.7 g/dL    Hematocrit 35.3 35.0 - 47.0 %     (H) 78 - 100 fL    MCH 33.2 (H) 26.5 - 33.0 pg    MCHC 32.6 31.5 - 36.5 g/dL    RDW 12.9 10.0 - 15.0 %    Platelet Count 267 150 - 450 10e3/uL    % Neutrophils 47 %    % Lymphocytes 42 %    % Monocytes 7 %    % Eosinophils 3 %    % Basophils 0 %    % Immature Granulocytes 1 %    NRBCs per 100 WBC 0 <1 /100    Absolute Neutrophils 4.8 1.6 - 8.3 10e3/uL    Absolute Lymphocytes 4.4 0.8 - 5.3 10e3/uL    Absolute Monocytes 0.8 0.0 - 1.3 10e3/uL    Absolute Eosinophils 0.4 0.0 -  0.7 10e3/uL    Absolute Basophils 0.0 0.0 - 0.2 10e3/uL    Absolute Immature Granulocytes 0.1 <=0.4 10e3/uL    Absolute NRBCs 0.0 10e3/uL   ECG 12-LEAD WITH MUSE (LHE)    Collection Time: 04/27/23  3:33 PM   Result Value Ref Range    Systolic Blood Pressure 192 mmHg    Diastolic Blood Pressure 75 mmHg    Ventricular Rate 53 BPM    Atrial Rate 241 BPM    WV Interval  ms    QRS Duration 78 ms     ms    QTc 439 ms    P Axis  degrees    R AXIS 7 degrees    T Axis 59 degrees    Interpretation ECG       Junctional rhythm  Abnormal ECG  When compared with ECG of 11-SEP-2021 00:18,  Junctional rhythm has replaced Atrial fibrillation  Vent. rate has decreased BY  63 BPM  ST no longer depressed in Inferior leads  ST no longer depressed in Lateral leads  Nonspecific T wave abnormality no longer evident in Inferior leads  Confirmed by SEE ED PROVIDER NOTE FOR, ECG INTERPRETATION ([id number]),  HERB JACINTO ([id number]) on 4/28/2023 10:57:34 AM     Troponin T, High Sensitivity    Collection Time: 04/27/23  5:45 PM   Result Value Ref Range    Troponin T, High Sensitivity 41 (H) <=14 ng/L   Basic metabolic panel    Collection Time: 04/28/23  7:35 AM   Result Value Ref Range    Sodium 140 136 - 145 mmol/L    Potassium 4.3 3.4 - 5.3 mmol/L    Chloride 105 98 - 107 mmol/L    Carbon Dioxide (CO2) 28 22 - 29 mmol/L    Anion Gap 7 7 - 15 mmol/L    Urea Nitrogen 20.8 8.0 - 23.0 mg/dL    Creatinine 0.98 (H) 0.51 - 0.95 mg/dL    Calcium 8.9 8.2 - 9.6 mg/dL    Glucose 98 70 - 99 mg/dL    GFR Estimate 54 (L) >60 mL/min/1.73m2   CBC with platelets    Collection Time: 04/28/23  7:35 AM   Result Value Ref Range    WBC Count 8.6 4.0 - 11.0 10e3/uL    RBC Count 3.00 (L) 3.80 - 5.20 10e6/uL    Hemoglobin 10.0 (L) 11.7 - 15.7 g/dL    Hematocrit 30.5 (L) 35.0 - 47.0 %     (H) 78 - 100 fL    MCH 33.3 (H) 26.5 - 33.0 pg    MCHC 32.8 31.5 - 36.5 g/dL    RDW 12.9 10.0 - 15.0 %    Platelet Count 216 150 - 450 10e3/uL     Roberto Matute  PGY3  [facility]      Examined independently and with resident.  Having back pain and is on scheduled Tylenol  Also has narcotics and muscle relaxers  Advised sparing use.  Outpatient follow-up with spine clinic  At baseline she is a high fall risk  Has lymphedema arthritis of the knees with poor depth perception due to vision impairment and hearing loss  Currently using a wheelchair  Also has some mild cognitive impairment slums is 21/30  Discharge to the nursing home  Discussed with staff to discuss discharge planning  Agree with above assesment plan    Electronically signed by  Anita Jin MD

## 2023-05-03 NOTE — LETTER
5/3/2023        RE: Tia Brooke  [address] [place] [address]        [facility]  Chief Complaint   Patient presents with     [facility] F/U     [facility] Record Number:  [id number]  Place of Service where encounter took place:  [facility] (formerly [facility]) ([facility]) [[id number]]  Code Status:  No CPR- Do NOT Intubate     HISTORY:      HPI:  Tia Brooke  is 89 year old (3/21/1932) undergoing physical and occupational therapy. She is with medical history of COPD and chronic atrial fibrillation on anticoagulation, admitted on 4/27/2023 after a mechanical fall, resulting in a scalp laceration, acute superior endplate compression fracture of L2 and progression of previous L4 compression fracture.      Today she was seen to review vital signs, labs, routine visit and to establish care.  She denied chest pain shortness of breath cough congestion constipation or diarrhea.  Her pain is controlled with current medications.  Her scalp staples have been removed she denies any numbness or tingling.  She is currently wearing an Aspen corset when out of bed.  Labs reviewed from 5/4/2023 last hemoglobin 10.3, magnesium level slightly elevated at 2.5 not on magnesium.     ALLERGIES:Patient has no known allergies.    PAST MEDICAL HISTORY: History reviewed. No pertinent past medical history.    PAST SURGICAL HISTORY:   has a past surgical history that includes Total Hip Arthroplasty (Right, 12/31/2009); Phacoemulsification clear cornea with standard intraocular lens implant (Bilateral); and Colectomy Partial (11/01/2004).    FAMILY HISTORY: family history includes Atrial fibrillation in her daughter; Chronic Obstructive Pulmonary Disease in her father; Heart Disease in her brother; Heart Failure in her mother; Hyperlipidemia in her daughter; Hypertension in her daughter; Leukemia in her mother.    SOCIAL HISTORY:  reports that she has never smoked. She has never used smokeless tobacco. She reports  "current alcohol use. She reports that she does not use drugs.    ROS:  Constitutional: Negative for activity change, appetite change, fatigue and fever.   HENT: Negative for congestion.    Respiratory: Negative for cough, shortness of breath and wheezing.    Cardiovascular: Negative for chest pain and leg swelling.   Gastrointestinal: Negative for abdominal distention, abdominal pain, constipation, diarrhea and nausea.   Genitourinary: Negative for dysuria.   Musculoskeletal: Negative for arthralgia.  Positive for back pain.   Skin: Negative for color change and wound.   Neurological: Negative for dizziness.   Psychiatric/Behavioral: Negative for agitation, behavioral problems and confusion.     Physical Exam:  Constitutional:       Appearance: Patient is well-developed.   HENT:      Head: Normocephalic.   Eyes:      Conjunctiva/sclera: Conjunctivae normal.   Neck:      Musculoskeletal: Normal range of motion.   Cardiovascular:      Rate and Rhythm: Normal rate and regular rhythm.      Heart sounds: Normal heart sounds. No murmur.   Pulmonary:      Effort: No respiratory distress.      Breath sounds: Normal breath sounds. No wheezing or rales.   Abdominal:      General: Bowel sounds are normal. There is no distension.      Palpations: Abdomen is soft.      Tenderness: There is no abdominal tenderness.   Musculoskeletal:       Normal range of motion.     Skin:General:        Skin is warm.   Neurological:         Mental Status: Patient is alert and oriented to person, place, and time.   Psychiatric:         Behavior: Behavior normal.     Vitals:/60   Pulse 65   Temp 97.6  F (36.4  C)   Resp 18   Ht 1.473 m (4' 10\")   Wt 72.6 kg (160 lb)   SpO2 95%   BMI 33.44 kg/m   and Body mass index is 33.44 kg/m .    Lab/Diagnostic data:   Recent Results (from the past 240 hour(s))   Basic metabolic panel    Collection Time: 04/27/23  3:33 PM   Result Value Ref Range    Sodium 140 136 - 145 mmol/L    Potassium 4.1 3.4 " - 5.3 mmol/L    Chloride 101 98 - 107 mmol/L    Carbon Dioxide (CO2) 30 (H) 22 - 29 mmol/L    Anion Gap 9 7 - 15 mmol/L    Urea Nitrogen 23.8 (H) 8.0 - 23.0 mg/dL    Creatinine 1.38 (H) 0.51 - 0.95 mg/dL    Calcium 9.8 (H) 8.2 - 9.6 mg/dL    Glucose 124 (H) 70 - 99 mg/dL    GFR Estimate 36 (L) >60 mL/min/1.73m2   Troponin T, High Sensitivity    Collection Time: 04/27/23  3:33 PM   Result Value Ref Range    Troponin T, High Sensitivity 40 (H) <=14 ng/L   Nt probnp inpatient (BNP)    Collection Time: 04/27/23  3:33 PM   Result Value Ref Range    N terminal Pro BNP Inpatient 348 0 - 1,800 pg/mL   INR    Collection Time: 04/27/23  3:33 PM   Result Value Ref Range    INR 1.24 (H) 0.85 - 1.15   Partial thromboplastin time    Collection Time: 04/27/23  3:33 PM   Result Value Ref Range    aPTT 29 22 - 38 Seconds   CBC with platelets and differential    Collection Time: 04/27/23  3:33 PM   Result Value Ref Range    WBC Count 10.4 4.0 - 11.0 10e3/uL    RBC Count 3.46 (L) 3.80 - 5.20 10e6/uL    Hemoglobin 11.5 (L) 11.7 - 15.7 g/dL    Hematocrit 35.3 35.0 - 47.0 %     (H) 78 - 100 fL    MCH 33.2 (H) 26.5 - 33.0 pg    MCHC 32.6 31.5 - 36.5 g/dL    RDW 12.9 10.0 - 15.0 %    Platelet Count 267 150 - 450 10e3/uL    % Neutrophils 47 %    % Lymphocytes 42 %    % Monocytes 7 %    % Eosinophils 3 %    % Basophils 0 %    % Immature Granulocytes 1 %    NRBCs per 100 WBC 0 <1 /100    Absolute Neutrophils 4.8 1.6 - 8.3 10e3/uL    Absolute Lymphocytes 4.4 0.8 - 5.3 10e3/uL    Absolute Monocytes 0.8 0.0 - 1.3 10e3/uL    Absolute Eosinophils 0.4 0.0 - 0.7 10e3/uL    Absolute Basophils 0.0 0.0 - 0.2 10e3/uL    Absolute Immature Granulocytes 0.1 <=0.4 10e3/uL    Absolute NRBCs 0.0 10e3/uL   ECG 12-LEAD WITH MUSE (LHE)    Collection Time: 04/27/23  3:33 PM   Result Value Ref Range    Systolic Blood Pressure 192 mmHg    Diastolic Blood Pressure 75 mmHg    Ventricular Rate 53 BPM    Atrial Rate 241 BPM    TX Interval  ms    QRS Duration 78  ms     ms    QTc 439 ms    P Axis  degrees    R AXIS 7 degrees    T Axis 59 degrees    Interpretation ECG       Junctional rhythm  Abnormal ECG  When compared with ECG of 11-SEP-2021 00:18,  Junctional rhythm has replaced Atrial fibrillation  Vent. rate has decreased BY  63 BPM  ST no longer depressed in Inferior leads  ST no longer depressed in Lateral leads  Nonspecific T wave abnormality no longer evident in Inferior leads  Confirmed by SEE ED PROVIDER NOTE FOR, ECG INTERPRETATION ([id number]),  HERB JACINTO ([id number]) on 4/28/2023 10:57:34 AM     Troponin T, High Sensitivity    Collection Time: 04/27/23  5:45 PM   Result Value Ref Range    Troponin T, High Sensitivity 41 (H) <=14 ng/L   Basic metabolic panel    Collection Time: 04/28/23  7:35 AM   Result Value Ref Range    Sodium 140 136 - 145 mmol/L    Potassium 4.3 3.4 - 5.3 mmol/L    Chloride 105 98 - 107 mmol/L    Carbon Dioxide (CO2) 28 22 - 29 mmol/L    Anion Gap 7 7 - 15 mmol/L    Urea Nitrogen 20.8 8.0 - 23.0 mg/dL    Creatinine 0.98 (H) 0.51 - 0.95 mg/dL    Calcium 8.9 8.2 - 9.6 mg/dL    Glucose 98 70 - 99 mg/dL    GFR Estimate 54 (L) >60 mL/min/1.73m2   CBC with platelets    Collection Time: 04/28/23  7:35 AM   Result Value Ref Range    WBC Count 8.6 4.0 - 11.0 10e3/uL    RBC Count 3.00 (L) 3.80 - 5.20 10e6/uL    Hemoglobin 10.0 (L) 11.7 - 15.7 g/dL    Hematocrit 30.5 (L) 35.0 - 47.0 %     (H) 78 - 100 fL    MCH 33.3 (H) 26.5 - 33.0 pg    MCHC 32.8 31.5 - 36.5 g/dL    RDW 12.9 10.0 - 15.0 %    Platelet Count 216 150 - 450 10e3/uL   Basic metabolic panel    Collection Time: 05/04/23  7:51 AM   Result Value Ref Range    Sodium 138 136 - 145 mmol/L    Potassium 3.9 3.4 - 5.3 mmol/L    Chloride 101 98 - 107 mmol/L    Carbon Dioxide (CO2) 26 22 - 29 mmol/L    Anion Gap 11 7 - 15 mmol/L    Urea Nitrogen 29.2 (H) 8.0 - 23.0 mg/dL    Creatinine 0.97 (H) 0.51 - 0.95 mg/dL    Calcium 9.0 8.2 - 9.6 mg/dL    Glucose 91 70 - 99 mg/dL    GFR Estimate 55  (L) >60 mL/min/1.73m2   Magnesium    Collection Time: 05/04/23  7:51 AM   Result Value Ref Range    Magnesium 2.5 (H) 1.7 - 2.3 mg/dL   CBC with platelets and differential    Collection Time: 05/04/23  7:51 AM   Result Value Ref Range    WBC Count 9.7 4.0 - 11.0 10e3/uL    RBC Count 3.15 (L) 3.80 - 5.20 10e6/uL    Hemoglobin 10.3 (L) 11.7 - 15.7 g/dL    Hematocrit 32.8 (L) 35.0 - 47.0 %     (H) 78 - 100 fL    MCH 32.7 26.5 - 33.0 pg    MCHC 31.4 (L) 31.5 - 36.5 g/dL    RDW 12.8 10.0 - 15.0 %    Platelet Count 388 150 - 450 10e3/uL    % Neutrophils 49 %    % Lymphocytes 41 %    % Monocytes 5 %    % Eosinophils 4 %    % Basophils 0 %    % Immature Granulocytes 1 %    NRBCs per 100 WBC 0 <1 /100    Absolute Neutrophils 4.8 1.6 - 8.3 10e3/uL    Absolute Lymphocytes 4.0 0.8 - 5.3 10e3/uL    Absolute Monocytes 0.5 0.0 - 1.3 10e3/uL    Absolute Eosinophils 0.4 0.0 - 0.7 10e3/uL    Absolute Basophils 0.0 0.0 - 0.2 10e3/uL    Absolute Immature Granulocytes 0.1 <=0.4 10e3/uL    Absolute NRBCs 0.0 10e3/uL       MEDICATIONS:     Review of your medicines          Accurate as of May 3, 2023 11:59 PM. If you have any questions, ask your nurse or doctor.            CONTINUE these medicines which have NOT CHANGED      Dose / Directions   acetaminophen 325 MG tablet  Commonly known as: TYLENOL  Used for: Closed compression fracture of L2 lumbar vertebra, initial encounter (H)      Dose: 975 mg  Take 3 tablets (975 mg) by mouth every 8 hours  Quantity: 90 tablet  Refills: 0     albuterol 108 (90 Base) MCG/ACT inhaler  Commonly known as: PROAIR HFA/PROVENTIL HFA/VENTOLIN HFA      Dose: 2 puff  [ALBUTEROL (PROAIR HFA) 90 MCG/ACTUATION INHALER] Inhale 2 puffs every 4 (four) hours as needed. 2 puffs every 4-6 hours as needed  Refills: 0     apixaban ANTICOAGULANT 5 MG tablet  Commonly known as: ELIQUIS      Dose: 5 mg  Take 5 mg by mouth 2 times daily  Refills: 0     atorvastatin 20 MG tablet  Commonly known as: LIPITOR      Dose:  20 mg  [ATORVASTATIN (LIPITOR) 20 MG TABLET] Take 20 mg by mouth at bedtime.  Refills: 0     CALCIUM-VITAMIN D PO      Dose: 1 tablet  Take 1 tablet by mouth every morning  Refills: 0     fluticasone 100 MCG/ACT inhaler  Commonly known as: ARNUITY ELLIPTA      Dose: 1 puff  Inhale 1 puff into the lungs every morning  Refills: 0     furosemide 20 MG tablet  Commonly known as: LASIX  Indication: Leg swelling      Dose: 20 mg  Take 20 mg by mouth every morning  Refills: 0     gabapentin 100 MG capsule  Commonly known as: NEURONTIN      Dose: 100 mg  Take 100 mg by mouth 3 times daily  Refills: 0     GLUCOSAMINE 1500 COMPLEX PO      Dose: 1 tablet  Take 1 tablet by mouth every morning  Refills: 0     ipratropium - albuterol 0.5 mg/2.5 mg/3 mL 0.5-2.5 (3) MG/3ML neb solution  Commonly known as: DUONEB  Used for: Exacerbation of persistent asthma, unspecified asthma severity      Dose: 3 mL  [IPRATROPIUM-ALBUTEROL (DUO-NEB) 0.5-2.5 MG/3 ML NEBULIZER] Inhale 3 mL every 6 (six) hours as needed.  Quantity: 40 vial  Refills: 1     Lidocaine 4 % Patch  Commonly known as: LIDOCARE  Used for: Closed compression fracture of L2 lumbar vertebra, initial encounter (H)      Dose: 2 patch  Place 2 patches onto the skin every 24 hours To prevent lidocaine toxicity, patient should be patch free for 12 hrs daily.  Quantity: 14 patch  Refills: 0     methocarbamol 500 MG tablet  Commonly known as: ROBAXIN  Used for: Closed compression fracture of L2 lumbar vertebra, initial encounter (H)      Dose: 500 mg  Take 1 tablet (500 mg) by mouth 4 times daily as needed for muscle spasms or other (pain)  Quantity: 30 tablet  Refills: 0     metoprolol tartrate 25 MG tablet  Commonly known as: LOPRESSOR  Used for: Paroxysmal atrial fibrillation (H)      Dose: 25 mg  Take 1 tablet (25 mg) by mouth 2 times daily  Quantity: 60 tablet  Refills: 0     montelukast 10 MG tablet  Commonly known as: SINGULAIR      Dose: 10 mg  [MONTELUKAST (SINGULAIR) 10 MG  TABLET] Take 10 mg by mouth bedtime.  Refills: 0     multivitamin Tabs tablet      Dose: 1 tablet  Take 1 tablet by mouth daily  Refills: 0     omega-3 fatty acids 1200 MG capsule      Dose: 1 capsule  Take 1 capsule by mouth every morning  Refills: 0     oxyCODONE 5 MG tablet  Commonly known as: ROXICODONE  Used for: Closed compression fracture of L2 lumbar vertebra, initial encounter (H)      Dose: 5 mg  Take 1 tablet (5 mg) by mouth every 4 hours as needed for severe pain  Quantity: 20 tablet  Refills: 0     senna-docusate 8.6-50 MG tablet  Commonly known as: SENOKOT-S/PERICOLACE  Used for: Closed compression fracture of L2 lumbar vertebra, initial encounter (H)      Dose: 1 tablet  Take 1 tablet by mouth 2 times daily  Quantity: 60 tablet  Refills: 0     traZODone 50 MG tablet  Commonly known as: DESYREL      Dose: 50 mg  Take 50 mg by mouth At Bedtime  Refills: 0     Vitamin D3 25 mcg (1000 units) tablet  Commonly known as: CHOLECALCIFEROL      Dose: 1,000 Units  Take 1,000 Units by mouth every morning  Refills: 0            ASSESSMENT/PLAN  Encounter Diagnoses   Name Primary?     Acute low back pain, unspecified back pain laterality, unspecified whether sciatica present Yes     Physical deconditioning      Closed compression fracture of L2 lumbar vertebra, initial encounter (H)      Laceration of scalp, initial encounter        Pain management Tylenol every 8 hours,lidocaine patch, Robaxin every 6 hours as needed, oxycodone 5 mg every 4 hours as needed    Shortness of breath wheezing albuterol inhaler every 4 hours as needed     atrial fibrillation on Eliquis 5 mg twice daily     HDL continue atorvastatin 20 mg at bedtime    COPD continue fluticasone inhaler daily, DuoNeb every 6 hours as needed    Leg swelling on 20 mg of Lasix daily    Nerve pain continue gabapentin    Hypertension on Metroprolol tartrate 25 mg twice daily    Mood disorder on trazodone    Electronically signed by: Maggi Stokes  CNP      Sincerely,        Maggi Stokes CNP

## 2023-05-04 ENCOUNTER — TRANSITIONAL CARE UNIT VISIT (OUTPATIENT)
Dept: GERIATRICS | Facility: CLINIC | Age: 88
End: 2023-05-04
Payer: MEDICARE

## 2023-05-04 ENCOUNTER — TELEPHONE (OUTPATIENT)
Dept: GERIATRICS | Facility: CLINIC | Age: 88
End: 2023-05-04

## 2023-05-04 VITALS
TEMPERATURE: 97.3 F | OXYGEN SATURATION: 97 % | SYSTOLIC BLOOD PRESSURE: 139 MMHG | HEART RATE: 60 BPM | DIASTOLIC BLOOD PRESSURE: 50 MMHG | HEIGHT: 58 IN | BODY MASS INDEX: 33.58 KG/M2 | RESPIRATION RATE: 20 BRPM | WEIGHT: 160 LBS

## 2023-05-04 DIAGNOSIS — H91.90 HEARING LOSS, UNSPECIFIED HEARING LOSS TYPE, UNSPECIFIED LATERALITY: ICD-10-CM

## 2023-05-04 DIAGNOSIS — N18.30 STAGE 3 CHRONIC KIDNEY DISEASE, UNSPECIFIED WHETHER STAGE 3A OR 3B CKD (H): Primary | ICD-10-CM

## 2023-05-04 DIAGNOSIS — I48.91 ATRIAL FIBRILLATION, UNSPECIFIED TYPE (H): ICD-10-CM

## 2023-05-04 DIAGNOSIS — M54.50 ACUTE LOW BACK PAIN, UNSPECIFIED BACK PAIN LATERALITY, UNSPECIFIED WHETHER SCIATICA PRESENT: ICD-10-CM

## 2023-05-04 DIAGNOSIS — S01.01XA LACERATION OF SCALP, INITIAL ENCOUNTER: ICD-10-CM

## 2023-05-04 DIAGNOSIS — S32.020A CLOSED COMPRESSION FRACTURE OF L2 LUMBAR VERTEBRA, INITIAL ENCOUNTER (H): ICD-10-CM

## 2023-05-04 DIAGNOSIS — H35.30 MACULAR DEGENERATION OF BOTH EYES, UNSPECIFIED TYPE: ICD-10-CM

## 2023-05-04 DIAGNOSIS — I10 ESSENTIAL HYPERTENSION: ICD-10-CM

## 2023-05-04 PROBLEM — J96.01 ACUTE RESPIRATORY FAILURE WITH HYPOXIA (H): Status: RESOLVED | Noted: 2021-09-07 | Resolved: 2023-05-04

## 2023-05-04 LAB
ANION GAP SERPL CALCULATED.3IONS-SCNC: 11 MMOL/L (ref 7–15)
BASOPHILS # BLD AUTO: 0 10E3/UL (ref 0–0.2)
BASOPHILS NFR BLD AUTO: 0 %
BUN SERPL-MCNC: 29.2 MG/DL (ref 8–23)
CALCIUM SERPL-MCNC: 9 MG/DL (ref 8.2–9.6)
CHLORIDE SERPL-SCNC: 101 MMOL/L (ref 98–107)
CREAT SERPL-MCNC: 0.97 MG/DL (ref 0.51–0.95)
DEPRECATED HCO3 PLAS-SCNC: 26 MMOL/L (ref 22–29)
EOSINOPHIL # BLD AUTO: 0.4 10E3/UL (ref 0–0.7)
EOSINOPHIL NFR BLD AUTO: 4 %
ERYTHROCYTE [DISTWIDTH] IN BLOOD BY AUTOMATED COUNT: 12.8 % (ref 10–15)
GFR SERPL CREATININE-BSD FRML MDRD: 55 ML/MIN/1.73M2
GLUCOSE SERPL-MCNC: 91 MG/DL (ref 70–99)
HCT VFR BLD AUTO: 32.8 % (ref 35–47)
HGB BLD-MCNC: 10.3 G/DL (ref 11.7–15.7)
IMM GRANULOCYTES # BLD: 0.1 10E3/UL
IMM GRANULOCYTES NFR BLD: 1 %
LYMPHOCYTES # BLD AUTO: 4 10E3/UL (ref 0.8–5.3)
LYMPHOCYTES NFR BLD AUTO: 41 %
MAGNESIUM SERPL-MCNC: 2.5 MG/DL (ref 1.7–2.3)
MCH RBC QN AUTO: 32.7 PG (ref 26.5–33)
MCHC RBC AUTO-ENTMCNC: 31.4 G/DL (ref 31.5–36.5)
MCV RBC AUTO: 104 FL (ref 78–100)
MONOCYTES # BLD AUTO: 0.5 10E3/UL (ref 0–1.3)
MONOCYTES NFR BLD AUTO: 5 %
NEUTROPHILS # BLD AUTO: 4.8 10E3/UL (ref 1.6–8.3)
NEUTROPHILS NFR BLD AUTO: 49 %
NRBC # BLD AUTO: 0 10E3/UL
NRBC BLD AUTO-RTO: 0 /100
PLATELET # BLD AUTO: 388 10E3/UL (ref 150–450)
POTASSIUM SERPL-SCNC: 3.9 MMOL/L (ref 3.4–5.3)
RBC # BLD AUTO: 3.15 10E6/UL (ref 3.8–5.2)
SODIUM SERPL-SCNC: 138 MMOL/L (ref 136–145)
WBC # BLD AUTO: 9.7 10E3/UL (ref 4–11)

## 2023-05-04 PROCEDURE — 83735 ASSAY OF MAGNESIUM: CPT | Performed by: NURSE PRACTITIONER

## 2023-05-04 PROCEDURE — 85025 COMPLETE CBC W/AUTO DIFF WBC: CPT | Performed by: NURSE PRACTITIONER

## 2023-05-04 PROCEDURE — 99305 1ST NF CARE MODERATE MDM 35: CPT | Performed by: FAMILY MEDICINE

## 2023-05-04 PROCEDURE — 36415 COLL VENOUS BLD VENIPUNCTURE: CPT | Performed by: NURSE PRACTITIONER

## 2023-05-04 PROCEDURE — 80048 BASIC METABOLIC PNL TOTAL CA: CPT | Performed by: NURSE PRACTITIONER

## 2023-05-04 PROCEDURE — P9603 ONE-WAY ALLOW PRORATED MILES: HCPCS | Performed by: NURSE PRACTITIONER

## 2023-05-04 NOTE — LETTER
5/4/2023        RE: Tia Brooke  [address] [place] [address]        [facility] GERIATRIC SERVICES       Patient Tia Brooke  MRN: [id number]        Reason for Visit     Chief Complaint   Patient presents with     RECHECK     INITIAL       Code Status     DNR only    Assessment     L 2 compression fracture  H/o of fall  COPD  Persistent Asthma  HTN  Atrial Fibrillation on AC (eliquis)  Generalized weakness    Plan     Pt is admitted to TCU for strengthening and rehab.    Patient admitted with gait instability, weakness and recurrent falls  Has L2 and L4 compression fractures: Non-surgical w/ Aspen corset for comfort  Pain management optimized using Tylenol/Robaxin/Lidocaine patches  - Discontinue Norco, Oxy prn  Lived in her own house independently in [place] prior to hospitalization/TCU, has family support.  Insomnia - Trazodone PRN  A. Fibb - on Eliquis, Metoprolol.    Discussed she may require placement pending rehabilitation, goal is to return home.    Recheck labs  Continue with PT/OT    History     Patient is a very pleasant 89 year old female who is admitted to TCU    Patient has a medical history of COPD and chronic atrial fibrillation on anticoagulation, admitted on 4/27/2023 after a mechanical fall, resulting in a scalp laceration, acute superior endplate compression fracture of L2 and progression of previous L4 compression fracture managed with Aspen Corset. Pain is much better controlled with scheduled Tylenol/Oxycodone/Norco and Robaxin. Feels she is progressing daily with therapies but is still requiring assistance for mobility/transfers/dressings.    Past Medical & Surgical History     PAST MEDICAL HISTORY: lymhedema  Vision impairment  PAST SURGICAL HISTORY:   has a past surgical history that includes Total Hip Arthroplasty (Right, 12/31/2009); Phacoemulsification clear cornea with standard intraocular lens implant (Bilateral); and Colectomy Partial  (11/01/2004).      Past Social History     Reviewed,  reports that she has never smoked. She has never used smokeless tobacco. She reports current alcohol use. She reports that she does not use drugs.    Family History     Reviewed, and family history includes Atrial fibrillation in her daughter; Chronic Obstructive Pulmonary Disease in her father; Heart Disease in her brother; Heart Failure in her mother; Hyperlipidemia in her daughter; Hypertension in her daughter; Leukemia in her mother.    Medication List     Current Outpatient Medications   Medication     acetaminophen (TYLENOL) 325 MG tablet     albuterol (PROAIR HFA) 90 mcg/actuation inhaler     apixaban ANTICOAGULANT (ELIQUIS) 5 MG tablet     atorvastatin (LIPITOR) 20 MG tablet     CALCIUM CARBONATE/VITAMIN D3 (CALCIUM 600 + D,3, ORAL)     cholecalciferol, vitamin D3, 1,000 unit tablet     fluticasone (ARNUITY ELLIPTA) 100 MCG/ACT inhaler     furosemide (LASIX) 20 MG tablet     gabapentin (NEURONTIN) 100 MG capsule     GLUC DELGADO/CHONDRO DELGADO A/VIT C/MN (GLUCOSAMINE 1500 COMPLEX ORAL)     ipratropium-albuterol (DUO-NEB) 0.5-2.5 mg/3 mL nebulizer     Lidocaine (LIDOCARE) 4 % Patch     methocarbamol (ROBAXIN) 500 MG tablet     metoprolol tartrate (LOPRESSOR) 25 MG tablet     montelukast (SINGULAIR) 10 mg tablet     omega-3 fatty acids-fish oil (FISH OIL) 360-1,200 mg cap     oxyCODONE (ROXICODONE) 5 MG tablet     senna-docusate (SENOKOT-S/PERICOLACE) 8.6-50 MG tablet     traZODone (DESYREL) 50 MG tablet     HYDROcodone-acetaminophen (NORCO) 5-325 MG tablet     No current facility-administered medications for this visit.      MED REC REQUIRED  Post Medication Reconciliation Status:         Allergies     No Known Allergies    Review of Systems   A comprehensive review of 14 systems was done. Pertinent findings noted here and in history of present illness. All the rest negative.  Constitutional: Negative.  Negative for fever, chills, appetite change and fatigue.  "  HENT: Negative for congestion, runny nose. [place]  Eyes: Negative for photophobia, redness and visual disturbance.   Has vision impairment  Respiratory: Negative for cough and chest tightness.    Cardiovascular: Negative for chest pain, palpitations.   Has chronic leg swelling  Gastrointestinal: Negative for nausea, diarrhea, constipation, blood in stool and abdominal discomfort.   Genitourinary: Negative.    Musculoskeletal: Bilateral lower extremity edema.    Has arthritis of knees  Skin: Negative.    Neurological: Negative for dizziness, tremors, syncope, weakness, light-headedness and headaches.   Hematological: Does not bruise/bleed easily.   Psychiatric/Behavioral: Negative.        Physical Exam   /50   Pulse 60   Temp 97.3  F (36.3  C)   Resp 20   Ht 1.473 m (4' 10\")   Wt 72.6 kg (160 lb)   SpO2 97%   BMI 33.44 kg/m       Constitutional: Oriented to person, place, and time and appears well-developed.   HEENT:  Normocephalic and atraumatic.  Eyes: Conjunctivae and EOM are normal. Pupils are equal, round, and reactive to light. No discharge.  No scleral icterus. Nose normal. Mouth/Throat: Oropharynx is clear and moist. No oropharyngeal exudate.    [place] and has hearing aids  Has vision impairment   NECK: Normal range of motion. Neck supple. No JVD present. No tracheal deviation present. No thyromegaly present.   CARDIOVASCULAR: Normal rate, regular rhythm and intact distal pulses.  Exam reveals no gallop and no friction rub.  Systolic murmur present.  PULMONARY: Effort normal and breath sounds normal. No respiratory distress.No Wheezing or rales.  ABDOMEN: Soft. Bowel sounds are normal. No distension and no mass.  There is no tenderness. There is no rebound and no guarding. No HSM.  MUSCULOSKELETAL: Normal range of motion. Mild kyphosis, l spine tenderness.  Has a brace  LYMPH NODES: Has no cervical, supraclavicular, axillary and groin adenopathy.   NEUROLOGICAL: Alert and oriented to person, place, " and time. No cranial nerve deficit.  Normal muscle tone. Coordination normal.   GENITOURINARY: Deferred exam.  SKIN: Skin is warm and dry. No rash noted. No erythema. No pallor.   EXTREMITIES: No cyanosis, no clubbing, 3+le edema. No Deformity.  PSYCHIATRIC: Normal mood, affect and behavior.      Lab Results     Recent Results (from the past 240 hour(s))   Basic metabolic panel    Collection Time: 04/27/23  3:33 PM   Result Value Ref Range    Sodium 140 136 - 145 mmol/L    Potassium 4.1 3.4 - 5.3 mmol/L    Chloride 101 98 - 107 mmol/L    Carbon Dioxide (CO2) 30 (H) 22 - 29 mmol/L    Anion Gap 9 7 - 15 mmol/L    Urea Nitrogen 23.8 (H) 8.0 - 23.0 mg/dL    Creatinine 1.38 (H) 0.51 - 0.95 mg/dL    Calcium 9.8 (H) 8.2 - 9.6 mg/dL    Glucose 124 (H) 70 - 99 mg/dL    GFR Estimate 36 (L) >60 mL/min/1.73m2   Troponin T, High Sensitivity    Collection Time: 04/27/23  3:33 PM   Result Value Ref Range    Troponin T, High Sensitivity 40 (H) <=14 ng/L   Nt probnp inpatient (BNP)    Collection Time: 04/27/23  3:33 PM   Result Value Ref Range    N terminal Pro BNP Inpatient 348 0 - 1,800 pg/mL   INR    Collection Time: 04/27/23  3:33 PM   Result Value Ref Range    INR 1.24 (H) 0.85 - 1.15   Partial thromboplastin time    Collection Time: 04/27/23  3:33 PM   Result Value Ref Range    aPTT 29 22 - 38 Seconds   CBC with platelets and differential    Collection Time: 04/27/23  3:33 PM   Result Value Ref Range    WBC Count 10.4 4.0 - 11.0 10e3/uL    RBC Count 3.46 (L) 3.80 - 5.20 10e6/uL    Hemoglobin 11.5 (L) 11.7 - 15.7 g/dL    Hematocrit 35.3 35.0 - 47.0 %     (H) 78 - 100 fL    MCH 33.2 (H) 26.5 - 33.0 pg    MCHC 32.6 31.5 - 36.5 g/dL    RDW 12.9 10.0 - 15.0 %    Platelet Count 267 150 - 450 10e3/uL    % Neutrophils 47 %    % Lymphocytes 42 %    % Monocytes 7 %    % Eosinophils 3 %    % Basophils 0 %    % Immature Granulocytes 1 %    NRBCs per 100 WBC 0 <1 /100    Absolute Neutrophils 4.8 1.6 - 8.3 10e3/uL    Absolute  Lymphocytes 4.4 0.8 - 5.3 10e3/uL    Absolute Monocytes 0.8 0.0 - 1.3 10e3/uL    Absolute Eosinophils 0.4 0.0 - 0.7 10e3/uL    Absolute Basophils 0.0 0.0 - 0.2 10e3/uL    Absolute Immature Granulocytes 0.1 <=0.4 10e3/uL    Absolute NRBCs 0.0 10e3/uL   ECG 12-LEAD WITH MUSE (LHE)    Collection Time: 04/27/23  3:33 PM   Result Value Ref Range    Systolic Blood Pressure 192 mmHg    Diastolic Blood Pressure 75 mmHg    Ventricular Rate 53 BPM    Atrial Rate 241 BPM    WA Interval  ms    QRS Duration 78 ms     ms    QTc 439 ms    P Axis  degrees    R AXIS 7 degrees    T Axis 59 degrees    Interpretation ECG       Junctional rhythm  Abnormal ECG  When compared with ECG of 11-SEP-2021 00:18,  Junctional rhythm has replaced Atrial fibrillation  Vent. rate has decreased BY  63 BPM  ST no longer depressed in Inferior leads  ST no longer depressed in Lateral leads  Nonspecific T wave abnormality no longer evident in Inferior leads  Confirmed by SEE ED PROVIDER NOTE FOR, ECG INTERPRETATION ([id number]),  HERB JACINTO ([id number]) on 4/28/2023 10:57:34 AM     Troponin T, High Sensitivity    Collection Time: 04/27/23  5:45 PM   Result Value Ref Range    Troponin T, High Sensitivity 41 (H) <=14 ng/L   Basic metabolic panel    Collection Time: 04/28/23  7:35 AM   Result Value Ref Range    Sodium 140 136 - 145 mmol/L    Potassium 4.3 3.4 - 5.3 mmol/L    Chloride 105 98 - 107 mmol/L    Carbon Dioxide (CO2) 28 22 - 29 mmol/L    Anion Gap 7 7 - 15 mmol/L    Urea Nitrogen 20.8 8.0 - 23.0 mg/dL    Creatinine 0.98 (H) 0.51 - 0.95 mg/dL    Calcium 8.9 8.2 - 9.6 mg/dL    Glucose 98 70 - 99 mg/dL    GFR Estimate 54 (L) >60 mL/min/1.73m2   CBC with platelets    Collection Time: 04/28/23  7:35 AM   Result Value Ref Range    WBC Count 8.6 4.0 - 11.0 10e3/uL    RBC Count 3.00 (L) 3.80 - 5.20 10e6/uL    Hemoglobin 10.0 (L) 11.7 - 15.7 g/dL    Hematocrit 30.5 (L) 35.0 - 47.0 %     (H) 78 - 100 fL    MCH 33.3 (H) 26.5 - 33.0 pg    MCHC  32.8 31.5 - 36.5 g/dL    RDW 12.9 10.0 - 15.0 %    Platelet Count 216 150 - 450 10e3/uL     Roberto Matute PGY3  [facility]      Examined independently and with resident.  Having back pain and is on scheduled Tylenol  Also has narcotics and muscle relaxers  Advised sparing use.  Outpatient follow-up with spine clinic  At baseline she is a high fall risk  Has lymphedema arthritis of the knees with poor depth perception due to vision impairment and hearing loss  Currently using a wheelchair  Also has some mild cognitive impairment slums is 21/30  Discharge to the nursing home  Discussed with staff to discuss discharge planning  Agree with above assesment plan    Electronically signed by  Anita Jin MD                           Sincerely,        LISANDRA Fernandez

## 2023-05-17 ENCOUNTER — DISCHARGE SUMMARY NURSING HOME (OUTPATIENT)
Dept: GERIATRICS | Facility: CLINIC | Age: 88
End: 2023-05-17
Payer: MEDICARE

## 2023-05-17 VITALS
OXYGEN SATURATION: 97 % | WEIGHT: 165 LBS | RESPIRATION RATE: 16 BRPM | HEIGHT: 60 IN | DIASTOLIC BLOOD PRESSURE: 57 MMHG | HEART RATE: 71 BPM | BODY MASS INDEX: 32.39 KG/M2 | SYSTOLIC BLOOD PRESSURE: 117 MMHG | TEMPERATURE: 97.7 F

## 2023-05-17 DIAGNOSIS — S01.01XA LACERATION OF SCALP, INITIAL ENCOUNTER: ICD-10-CM

## 2023-05-17 DIAGNOSIS — N18.30 STAGE 3 CHRONIC KIDNEY DISEASE, UNSPECIFIED WHETHER STAGE 3A OR 3B CKD (H): Primary | ICD-10-CM

## 2023-05-17 DIAGNOSIS — S32.020A CLOSED COMPRESSION FRACTURE OF L2 LUMBAR VERTEBRA, INITIAL ENCOUNTER (H): ICD-10-CM

## 2023-05-17 DIAGNOSIS — R53.81 PHYSICAL DECONDITIONING: ICD-10-CM

## 2023-05-17 DIAGNOSIS — I48.91 ATRIAL FIBRILLATION, UNSPECIFIED TYPE (H): ICD-10-CM

## 2023-05-17 PROCEDURE — 99316 NF DSCHRG MGMT 30 MIN+: CPT | Performed by: NURSE PRACTITIONER

## 2023-05-17 NOTE — PROGRESS NOTES
[facility] GERIATRIC SERVICES  Chief Complaint   Patient presents with     Discharge Summary Nursing [facility] Medical Record Number:  [id number]  Place of Service where encounter took place:  [facility] ([facility]) [[id number]]  Code Status:  No CPR- Do NOT Intubate     HISTORY:      HPI:  Tia Brooke  is 89 year old (3/21/1932) undergoing physical and occupational therapy. She is with medical history of COPD and chronic atrial fibrillation on anticoagulation, admitted on 4/27/2023 after a mechanical fall, resulting in a scalp laceration, acute superior endplate compression fracture of L2 and progression of previous L4 compression fracture.      Today she was seen to review vital signs, labs, routine visit and a face-to-face for discharge.  She will discharge to home on 5/19/2023 with current medications and treatments.  She will have home care services PT OT home health aide.  She denied chest pain shortness of breath cough congestion constipation or diarrhea.  Her pain is controlled with current medications.  Her scalp staples have been removed. she denies any numbness or tingling.  She is currently wearing an Aspen corset when out of bed.  Labs reviewed from 5/4/2023 last hemoglobin 10.3,     ALLERGIES:Patient has no known allergies.    PAST MEDICAL HISTORY: History reviewed. No pertinent past medical history.    PAST SURGICAL HISTORY:   has a past surgical history that includes Total Hip Arthroplasty (Right, 12/31/2009); Phacoemulsification clear cornea with standard intraocular lens implant (Bilateral); and Colectomy Partial (11/01/2004).    FAMILY HISTORY: family history includes Atrial fibrillation in her daughter; Chronic Obstructive Pulmonary Disease in her father; Heart Disease in her brother; Heart Failure in her mother; Hyperlipidemia in her daughter; Hypertension in her daughter; Leukemia in her mother.    SOCIAL HISTORY:  reports that she has never smoked. She has never used smokeless  tobacco. She reports current alcohol use. She reports that she does not use drugs.    ROS:  Constitutional: Negative for activity change, appetite change, fatigue and fever.   HENT: Negative for congestion.    Respiratory: Negative for cough, shortness of breath and wheezing.    Cardiovascular: Negative for chest pain and leg swelling.   Gastrointestinal: Negative for abdominal distention, abdominal pain, constipation, diarrhea and nausea.   Genitourinary: Negative for dysuria.   Musculoskeletal: Negative for arthralgia.  Positive for  intermittent back pain.   Skin: Negative for color change and wound.   Neurological: Negative for dizziness.   Psychiatric/Behavioral: Negative for agitation, behavioral problems and confusion.     Physical Exam:  Constitutional:       Appearance: Patient is well-developed.   HENT:      Head: Normocephalic.   Eyes:      Conjunctiva/sclera: Conjunctivae normal.   Neck:      Musculoskeletal: Normal range of motion.   Cardiovascular:      Rate and Rhythm: Normal rate and regular rhythm.      Heart sounds: Normal heart sounds. No murmur.   Pulmonary:      Effort: No respiratory distress.      Breath sounds: Normal breath sounds. No wheezing or rales.   Abdominal:      General: Bowel sounds are normal. There is no distension.      Palpations: Abdomen is soft.      Tenderness: There is no abdominal tenderness.   Musculoskeletal:       Normal range of motion.     Skin:General:        Skin is warm.   Neurological:         Mental Status: Patient is alert and oriented to person, place, and time.   Psychiatric:         Behavior: Behavior normal.     Vitals:/57   Pulse 71   Temp 97.7  F (36.5  C)   Resp 16   Ht 1.524 m (5')   Wt 74.8 kg (165 lb)   SpO2 97%   BMI 32.22 kg/m   and Body mass index is 32.22 kg/m .    Lab/Diagnostic data:   No results found for this or any previous visit (from the past 240 hour(s)).    MEDICATIONS:     Review of your medicines          Accurate as of May  17, 2023  9:20 AM. If you have any questions, ask your nurse or doctor.            CONTINUE these medicines which have NOT CHANGED      Dose / Directions   acetaminophen 325 MG tablet  Commonly known as: TYLENOL  Used for: Closed compression fracture of L2 lumbar vertebra, initial encounter (H)      Dose: 975 mg  Take 3 tablets (975 mg) by mouth every 8 hours  Quantity: 90 tablet  Refills: 0     albuterol 108 (90 Base) MCG/ACT inhaler  Commonly known as: PROAIR HFA/PROVENTIL HFA/VENTOLIN HFA      Dose: 2 puff  [ALBUTEROL (PROAIR HFA) 90 MCG/ACTUATION INHALER] Inhale 2 puffs every 4 (four) hours as needed. 2 puffs every 4-6 hours as needed  Refills: 0     apixaban ANTICOAGULANT 5 MG tablet  Commonly known as: ELIQUIS      Dose: 5 mg  Take 5 mg by mouth 2 times daily  Refills: 0     atorvastatin 20 MG tablet  Commonly known as: LIPITOR      Dose: 20 mg  [ATORVASTATIN (LIPITOR) 20 MG TABLET] Take 20 mg by mouth at bedtime.  Refills: 0     CALCIUM-VITAMIN D PO      Dose: 1 tablet  Take 1 tablet by mouth every morning  Refills: 0     fluticasone 100 MCG/ACT inhaler  Commonly known as: ARNUITY ELLIPTA      Dose: 1 puff  Inhale 1 puff into the lungs every morning  Refills: 0     furosemide 20 MG tablet  Commonly known as: LASIX  Indication: Leg swelling      Dose: 20 mg  Take 20 mg by mouth every morning  Refills: 0     gabapentin 100 MG capsule  Commonly known as: NEURONTIN      Dose: 100 mg  Take 100 mg by mouth 3 times daily  Refills: 0     GLUCOSAMINE 1500 COMPLEX PO      Dose: 1 tablet  Take 1 tablet by mouth every morning  Refills: 0     ipratropium - albuterol 0.5 mg/2.5 mg/3 mL 0.5-2.5 (3) MG/3ML neb solution  Commonly known as: DUONEB  Used for: Exacerbation of persistent asthma, unspecified asthma severity      Dose: 3 mL  [IPRATROPIUM-ALBUTEROL (DUO-NEB) 0.5-2.5 MG/3 ML NEBULIZER] Inhale 3 mL every 6 (six) hours as needed.  Quantity: 40 vial  Refills: 1     Lidocaine 4 % Patch  Commonly known as: LIDOCARE  Used  for: Closed compression fracture of L2 lumbar vertebra, initial encounter (H)      Dose: 2 patch  Place 2 patches onto the skin every 24 hours To prevent lidocaine toxicity, patient should be patch free for 12 hrs daily.  Quantity: 14 patch  Refills: 0     methocarbamol 500 MG tablet  Commonly known as: ROBAXIN  Used for: Closed compression fracture of L2 lumbar vertebra, initial encounter (H)      Dose: 500 mg  Take 1 tablet (500 mg) by mouth 4 times daily as needed for muscle spasms or other (pain)  Quantity: 30 tablet  Refills: 0     metoprolol tartrate 25 MG tablet  Commonly known as: LOPRESSOR  Used for: Paroxysmal atrial fibrillation (H)      Dose: 25 mg  Take 1 tablet (25 mg) by mouth 2 times daily  Quantity: 60 tablet  Refills: 0     montelukast 10 MG tablet  Commonly known as: SINGULAIR      Dose: 10 mg  [MONTELUKAST (SINGULAIR) 10 MG TABLET] Take 10 mg by mouth bedtime.  Refills: 0     omega-3 fatty acids 1200 MG capsule      Dose: 1 capsule  Take 1 capsule by mouth every morning  Refills: 0     oxyCODONE 5 MG tablet  Commonly known as: ROXICODONE  Used for: Closed compression fracture of L2 lumbar vertebra, initial encounter (H)      Dose: 5 mg  Take 1 tablet (5 mg) by mouth every 4 hours as needed for severe pain  Quantity: 20 tablet  Refills: 0     senna-docusate 8.6-50 MG tablet  Commonly known as: SENOKOT-S/PERICOLACE  Used for: Closed compression fracture of L2 lumbar vertebra, initial encounter (H)      Dose: 1 tablet  Take 1 tablet by mouth 2 times daily  Quantity: 60 tablet  Refills: 0     traZODone 50 MG tablet  Commonly known as: DESYREL      Dose: 50 mg  Take 50 mg by mouth At Bedtime  Refills: 0     Vitamin D3 25 mcg (1000 units) tablet  Commonly known as: CHOLECALCIFEROL      Dose: 1,000 Units  Take 1,000 Units by mouth every morning  Refills: 0            ASSESSMENT/PLAN  Encounter Diagnoses   Name Primary?     Stage 3 chronic kidney disease, unspecified whether stage 3a or 3b CKD (H) Yes      Atrial fibrillation, unspecified type (H)      Closed compression fracture of L2 lumbar vertebra, initial encounter (H)      Laceration of scalp, initial encounter      Physical deconditioning        Pain management Tylenol every 8 hours,lidocaine patch, Robaxin every 6 hours as needed, oxycodone 5 mg every 4 hours as needed    Shortness of breath wheezing albuterol inhaler every 4 hours as needed     atrial fibrillation on Eliquis 5 mg twice daily     HDL continue atorvastatin 20 mg at bedtime    COPD continue fluticasone inhaler daily, DuoNeb every 6 hours as needed    Leg swelling on 20 mg of Lasix daily    Nerve pain continue gabapentin    Hypertension on Metroprolol tartrate 25 mg twice daily    Mood disorder on trazodone        DISCHARGE PLAN/FACE TO FACE:  I certify that services are/were furnished while this patient was under the care of a physician and that a physician or an allowed non-physician practitioner (NPP), had a face-to-face encounter that meets the physician face-to-face encounter requirements. The encounter was in whole, or in part, related to the primary reason for home health. The patient is confined to his/her home and needs intermittent skilled nursing, physical therapy, speech-language pathology, or the continued need for occupational therapy. A plan of care has been established by a physician and is periodically reviewed by a physician.  Date of Face-to-Face Encounter: 5/17/2023    I certify that, based on my findings, the following services are medically necessary home health services: PT OT home health aide    My clinical findings support the need for the above skilled services because: PT OT for continued strength and endurance, home health aide to assist with activities of daily living,    This patient is homebound because: She is deconditioned following recent L2 compression fracture and will continue home therapy for strengthening     the patient is, or has been, under my care  and I have initiated the establishment of the plan of care. This patient will be followed by a physician who will periodically review the plan of care.    Schedule follow up visit with primary care provider within 7 days to reestablish care.    Electronically signed by: Maggi Stokes CNP

## 2023-05-17 NOTE — LETTER
5/17/2023        RE: Tia Brooke  [address] [place] [address]        [facility]  Chief Complaint   Patient presents with     Discharge Summary [facility] Medical Record Number:  [id number]  Place of Service where encounter took place:  [facility] ([facility]) [[id number]]  Code Status:  No CPR- Do NOT Intubate     HISTORY:      HPI:  Tia Brooke  is 89 year old (3/21/1932) undergoing physical and occupational therapy. She is with medical history of COPD and chronic atrial fibrillation on anticoagulation, admitted on 4/27/2023 after a mechanical fall, resulting in a scalp laceration, acute superior endplate compression fracture of L2 and progression of previous L4 compression fracture.      Today she was seen to review vital signs, labs, routine visit and a face-to-face for discharge.  She will discharge to home on 5/19/2023 with current medications and treatments.  She will have home care services PT OT home health aide.  She denied chest pain shortness of breath cough congestion constipation or diarrhea.  Her pain is controlled with current medications.  Her scalp staples have been removed. she denies any numbness or tingling.  She is currently wearing an Aspen corset when out of bed.  Labs reviewed from 5/4/2023 last hemoglobin 10.3,     ALLERGIES:Patient has no known allergies.    PAST MEDICAL HISTORY: History reviewed. No pertinent past medical history.    PAST SURGICAL HISTORY:   has a past surgical history that includes Total Hip Arthroplasty (Right, 12/31/2009); Phacoemulsification clear cornea with standard intraocular lens implant (Bilateral); and Colectomy Partial (11/01/2004).    FAMILY HISTORY: family history includes Atrial fibrillation in her daughter; Chronic Obstructive Pulmonary Disease in her father; Heart Disease in her brother; Heart Failure in her mother; Hyperlipidemia in her daughter; Hypertension in her daughter; Leukemia in her  mother.    SOCIAL HISTORY:  reports that she has never smoked. She has never used smokeless tobacco. She reports current alcohol use. She reports that she does not use drugs.    ROS:  Constitutional: Negative for activity change, appetite change, fatigue and fever.   HENT: Negative for congestion.    Respiratory: Negative for cough, shortness of breath and wheezing.    Cardiovascular: Negative for chest pain and leg swelling.   Gastrointestinal: Negative for abdominal distention, abdominal pain, constipation, diarrhea and nausea.   Genitourinary: Negative for dysuria.   Musculoskeletal: Negative for arthralgia.  Positive for  intermittent back pain.   Skin: Negative for color change and wound.   Neurological: Negative for dizziness.   Psychiatric/Behavioral: Negative for agitation, behavioral problems and confusion.     Physical Exam:  Constitutional:       Appearance: Patient is well-developed.   HENT:      Head: Normocephalic.   Eyes:      Conjunctiva/sclera: Conjunctivae normal.   Neck:      Musculoskeletal: Normal range of motion.   Cardiovascular:      Rate and Rhythm: Normal rate and regular rhythm.      Heart sounds: Normal heart sounds. No murmur.   Pulmonary:      Effort: No respiratory distress.      Breath sounds: Normal breath sounds. No wheezing or rales.   Abdominal:      General: Bowel sounds are normal. There is no distension.      Palpations: Abdomen is soft.      Tenderness: There is no abdominal tenderness.   Musculoskeletal:       Normal range of motion.     Skin:General:        Skin is warm.   Neurological:         Mental Status: Patient is alert and oriented to person, place, and time.   Psychiatric:         Behavior: Behavior normal.     Vitals:/57   Pulse 71   Temp 97.7  F (36.5  C)   Resp 16   Ht 1.524 m (5')   Wt 74.8 kg (165 lb)   SpO2 97%   BMI 32.22 kg/m   and Body mass index is 32.22 kg/m .    Lab/Diagnostic data:   No results found for this or any previous visit (from the  past 240 hour(s)).    MEDICATIONS:     Review of your medicines          Accurate as of May 17, 2023  9:20 AM. If you have any questions, ask your nurse or doctor.            CONTINUE these medicines which have NOT CHANGED      Dose / Directions   acetaminophen 325 MG tablet  Commonly known as: TYLENOL  Used for: Closed compression fracture of L2 lumbar vertebra, initial encounter (H)      Dose: 975 mg  Take 3 tablets (975 mg) by mouth every 8 hours  Quantity: 90 tablet  Refills: 0     albuterol 108 (90 Base) MCG/ACT inhaler  Commonly known as: PROAIR HFA/PROVENTIL HFA/VENTOLIN HFA      Dose: 2 puff  [ALBUTEROL (PROAIR HFA) 90 MCG/ACTUATION INHALER] Inhale 2 puffs every 4 (four) hours as needed. 2 puffs every 4-6 hours as needed  Refills: 0     apixaban ANTICOAGULANT 5 MG tablet  Commonly known as: ELIQUIS      Dose: 5 mg  Take 5 mg by mouth 2 times daily  Refills: 0     atorvastatin 20 MG tablet  Commonly known as: LIPITOR      Dose: 20 mg  [ATORVASTATIN (LIPITOR) 20 MG TABLET] Take 20 mg by mouth at bedtime.  Refills: 0     CALCIUM-VITAMIN D PO      Dose: 1 tablet  Take 1 tablet by mouth every morning  Refills: 0     fluticasone 100 MCG/ACT inhaler  Commonly known as: ARNUITY ELLIPTA      Dose: 1 puff  Inhale 1 puff into the lungs every morning  Refills: 0     furosemide 20 MG tablet  Commonly known as: LASIX  Indication: Leg swelling      Dose: 20 mg  Take 20 mg by mouth every morning  Refills: 0     gabapentin 100 MG capsule  Commonly known as: NEURONTIN      Dose: 100 mg  Take 100 mg by mouth 3 times daily  Refills: 0     GLUCOSAMINE 1500 COMPLEX PO      Dose: 1 tablet  Take 1 tablet by mouth every morning  Refills: 0     ipratropium - albuterol 0.5 mg/2.5 mg/3 mL 0.5-2.5 (3) MG/3ML neb solution  Commonly known as: DUONEB  Used for: Exacerbation of persistent asthma, unspecified asthma severity      Dose: 3 mL  [IPRATROPIUM-ALBUTEROL (DUO-NEB) 0.5-2.5 MG/3 ML NEBULIZER] Inhale 3 mL every 6 (six) hours as  needed.  Quantity: 40 vial  Refills: 1     Lidocaine 4 % Patch  Commonly known as: LIDOCARE  Used for: Closed compression fracture of L2 lumbar vertebra, initial encounter (H)      Dose: 2 patch  Place 2 patches onto the skin every 24 hours To prevent lidocaine toxicity, patient should be patch free for 12 hrs daily.  Quantity: 14 patch  Refills: 0     methocarbamol 500 MG tablet  Commonly known as: ROBAXIN  Used for: Closed compression fracture of L2 lumbar vertebra, initial encounter (H)      Dose: 500 mg  Take 1 tablet (500 mg) by mouth 4 times daily as needed for muscle spasms or other (pain)  Quantity: 30 tablet  Refills: 0     metoprolol tartrate 25 MG tablet  Commonly known as: LOPRESSOR  Used for: Paroxysmal atrial fibrillation (H)      Dose: 25 mg  Take 1 tablet (25 mg) by mouth 2 times daily  Quantity: 60 tablet  Refills: 0     montelukast 10 MG tablet  Commonly known as: SINGULAIR      Dose: 10 mg  [MONTELUKAST (SINGULAIR) 10 MG TABLET] Take 10 mg by mouth bedtime.  Refills: 0     omega-3 fatty acids 1200 MG capsule      Dose: 1 capsule  Take 1 capsule by mouth every morning  Refills: 0     oxyCODONE 5 MG tablet  Commonly known as: ROXICODONE  Used for: Closed compression fracture of L2 lumbar vertebra, initial encounter (H)      Dose: 5 mg  Take 1 tablet (5 mg) by mouth every 4 hours as needed for severe pain  Quantity: 20 tablet  Refills: 0     senna-docusate 8.6-50 MG tablet  Commonly known as: SENOKOT-S/PERICOLACE  Used for: Closed compression fracture of L2 lumbar vertebra, initial encounter (H)      Dose: 1 tablet  Take 1 tablet by mouth 2 times daily  Quantity: 60 tablet  Refills: 0     traZODone 50 MG tablet  Commonly known as: DESYREL      Dose: 50 mg  Take 50 mg by mouth At Bedtime  Refills: 0     Vitamin D3 25 mcg (1000 units) tablet  Commonly known as: CHOLECALCIFEROL      Dose: 1,000 Units  Take 1,000 Units by mouth every morning  Refills: 0            ASSESSMENT/PLAN  Encounter Diagnoses    Name Primary?     Stage 3 chronic kidney disease, unspecified whether stage 3a or 3b CKD (H) Yes     Atrial fibrillation, unspecified type (H)      Closed compression fracture of L2 lumbar vertebra, initial encounter (H)      Laceration of scalp, initial encounter      Physical deconditioning        Pain management Tylenol every 8 hours,lidocaine patch, Robaxin every 6 hours as needed, oxycodone 5 mg every 4 hours as needed    Shortness of breath wheezing albuterol inhaler every 4 hours as needed     atrial fibrillation on Eliquis 5 mg twice daily     HDL continue atorvastatin 20 mg at bedtime    COPD continue fluticasone inhaler daily, DuoNeb every 6 hours as needed    Leg swelling on 20 mg of Lasix daily    Nerve pain continue gabapentin    Hypertension on Metroprolol tartrate 25 mg twice daily    Mood disorder on trazodone        DISCHARGE PLAN/FACE TO FACE:  I certify that services are/were furnished while this patient was under the care of a physician and that a physician or an allowed non-physician practitioner (NPP), had a face-to-face encounter that meets the physician face-to-face encounter requirements. The encounter was in whole, or in part, related to the primary reason for home health. The patient is confined to his/her home and needs intermittent skilled nursing, physical therapy, speech-language pathology, or the continued need for occupational therapy. A plan of care has been established by a physician and is periodically reviewed by a physician.  Date of Face-to-Face Encounter: 5/17/2023    I certify that, based on my findings, the following services are medically necessary home health services: PT OT home health aide    My clinical findings support the need for the above skilled services because: PT OT for continued strength and endurance, home health aide to assist with activities of daily living,    This patient is homebound because: She is deconditioned following recent L2 compression fracture  and will continue home therapy for strengthening     the patient is, or has been, under my care and I have initiated the establishment of the plan of care. This patient will be followed by a physician who will periodically review the plan of care.    Schedule follow up visit with primary care provider within 7 days to reestablish care.    Electronically signed by: Maggi Stokes CNP          Sincerely,        Maggi Stokes CNP

## 2023-07-20 ENCOUNTER — APPOINTMENT (OUTPATIENT)
Dept: RADIOLOGY | Facility: CLINIC | Age: 88
End: 2023-07-20
Payer: MEDICARE

## 2023-07-20 ENCOUNTER — HOSPITAL ENCOUNTER (EMERGENCY)
Facility: CLINIC | Age: 88
Discharge: HOME | End: 2023-07-20
Attending: EMERGENCY MEDICINE | Admitting: EMERGENCY MEDICINE
Payer: MEDICARE

## 2023-07-20 ENCOUNTER — APPOINTMENT (OUTPATIENT)
Dept: CT IMAGING | Facility: CLINIC | Age: 88
End: 2023-07-20
Payer: MEDICARE

## 2023-07-20 VITALS
OXYGEN SATURATION: 96 % | HEART RATE: 54 BPM | TEMPERATURE: 98.3 F | RESPIRATION RATE: 22 BRPM | DIASTOLIC BLOOD PRESSURE: 60 MMHG | SYSTOLIC BLOOD PRESSURE: 133 MMHG

## 2023-07-20 DIAGNOSIS — S40.011A CONTUSION OF RIGHT SHOULDER, INITIAL ENCOUNTER: ICD-10-CM
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
## 2023-07-20 DIAGNOSIS — S80.02XA CONTUSION OF LEFT KNEE, INITIAL ENCOUNTER: ICD-10-CM

## 2023-07-20 LAB
ANION GAP SERPL CALCULATED.3IONS-SCNC: 10 MMOL/L (ref 5–18)
ATRIAL RATE - MUSE: 56 BPM
BASOPHILS # BLD AUTO: 0 10E3/UL (ref 0–0.2)
BASOPHILS NFR BLD AUTO: 0 %
BUN SERPL-MCNC: 14 MG/DL (ref 8–28)
CALCIUM SERPL-MCNC: 10.2 MG/DL (ref 8.5–10.5)
CHLORIDE BLD-SCNC: 102 MMOL/L (ref 98–107)
CO2 SERPL-SCNC: 28 MMOL/L (ref 22–31)
CREAT SERPL-MCNC: 0.84 MG/DL (ref 0.6–1.1)
DIASTOLIC BLOOD PRESSURE - MUSE: 66 MMHG
EOSINOPHIL # BLD AUTO: 0 10E3/UL (ref 0–0.7)
EOSINOPHIL NFR BLD AUTO: 0 %
ERYTHROCYTE [DISTWIDTH] IN BLOOD BY AUTOMATED COUNT: 12.1 % (ref 10–15)
GFR SERPL CREATININE-BSD FRML MDRD: 65 ML/MIN/1.73M2
GLUCOSE BLD-MCNC: 100 MG/DL (ref 70–125)
HCT VFR BLD AUTO: 38.1 % (ref 35–47)
HGB BLD-MCNC: 12.5 G/DL (ref 11.7–15.7)
IMM GRANULOCYTES # BLD: 0 10E3/UL
IMM GRANULOCYTES NFR BLD: 0 %
INTERPRETATION ECG - MUSE: NORMAL
LYMPHOCYTES # BLD AUTO: 3.2 10E3/UL (ref 0.8–5.3)
LYMPHOCYTES NFR BLD AUTO: 27 %
MCH RBC QN AUTO: 32.5 PG (ref 26.5–33)
MCHC RBC AUTO-ENTMCNC: 32.8 G/DL (ref 31.5–36.5)
MCV RBC AUTO: 99 FL (ref 78–100)
MONOCYTES # BLD AUTO: 0.6 10E3/UL (ref 0–1.3)
MONOCYTES NFR BLD AUTO: 5 %
NEUTROPHILS # BLD AUTO: 7.8 10E3/UL (ref 1.6–8.3)
NEUTROPHILS NFR BLD AUTO: 68 %
NRBC # BLD AUTO: 0 10E3/UL
NRBC BLD AUTO-RTO: 0 /100
P AXIS - MUSE: NORMAL DEGREES
PLATELET # BLD AUTO: 278 10E3/UL (ref 150–450)
POTASSIUM BLD-SCNC: 4 MMOL/L (ref 3.5–5)
PR INTERVAL - MUSE: 148 MS
QRS DURATION - MUSE: 88 MS
QT - MUSE: 434 MS
QTC - MUSE: 418 MS
R AXIS - MUSE: -13 DEGREES
RBC # BLD AUTO: 3.85 10E6/UL (ref 3.8–5.2)
SODIUM SERPL-SCNC: 140 MMOL/L (ref 136–145)
SYSTOLIC BLOOD PRESSURE - MUSE: 147 MMHG
T AXIS - MUSE: 31 DEGREES
VENTRICULAR RATE- MUSE: 56 BPM
WBC # BLD AUTO: 11.6 10E3/UL (ref 4–11)

## 2023-07-20 PROCEDURE — 80048 BASIC METABOLIC PNL TOTAL CA: CPT

## 2023-07-20 PROCEDURE — G1010 CDSM STANSON: HCPCS

## 2023-07-20 PROCEDURE — 99285 EMERGENCY DEPT VISIT HI MDM: CPT | Mod: 25

## 2023-07-20 PROCEDURE — 73562 X-RAY EXAM OF KNEE 3: CPT | Mod: LT

## 2023-07-20 PROCEDURE — 73502 X-RAY EXAM HIP UNI 2-3 VIEWS: CPT | Mod: XS

## 2023-07-20 PROCEDURE — 73030 X-RAY EXAM OF SHOULDER: CPT | Mod: RT

## 2023-07-20 PROCEDURE — 93005 ELECTROCARDIOGRAM TRACING: CPT | Performed by: EMERGENCY MEDICINE

## 2023-07-20 PROCEDURE — 36415 COLL VENOUS BLD VENIPUNCTURE: CPT | Performed by: EMERGENCY MEDICINE

## 2023-07-20 PROCEDURE — 85025 COMPLETE CBC W/AUTO DIFF WBC: CPT | Performed by: EMERGENCY MEDICINE

## 2023-07-20 PROCEDURE — 73502 X-RAY EXAM HIP UNI 2-3 VIEWS: CPT

## 2023-07-20 PROCEDURE — 250N000013 HC RX MED GY IP 250 OP 250 PS 637

## 2023-07-20 RX ORDER — ACETAMINOPHEN 325 MG/1
650 TABLET ORAL ONCE
Status: COMPLETED | OUTPATIENT
Start: 2023-07-20 | End: 2023-07-20

## 2023-07-20 RX ORDER — OXYCODONE HYDROCHLORIDE 5 MG/1
5 TABLET ORAL ONCE
Status: COMPLETED | OUTPATIENT
Start: 2023-07-20 | End: 2023-07-20

## 2023-07-20 RX ORDER — LIDOCAINE 40 MG/G
CREAM TOPICAL
Status: DISCONTINUED | OUTPATIENT
Start: 2023-07-20 | End: 2023-07-20 | Stop reason: HOSPADM

## 2023-07-20 RX ADMIN — OXYCODONE HYDROCHLORIDE 5 MG: 5 TABLET ORAL at 11:12

## 2023-07-20 RX ADMIN — ACETAMINOPHEN 650 MG: 325 TABLET ORAL at 09:11

## 2023-07-20 ASSESSMENT — ENCOUNTER SYMPTOMS
HEADACHES: 0
PALPITATIONS: 0
BACK PAIN: 1
WEAKNESS: 0
SHORTNESS OF BREATH: 0
FACIAL ASYMMETRY: 0
SEIZURES: 0
NUMBNESS: 0
LIGHT-HEADEDNESS: 0
DIZZINESS: 0
CHEST TIGHTNESS: 0
NECK PAIN: 1

## 2023-07-20 ASSESSMENT — ACTIVITIES OF DAILY LIVING (ADL)
ADLS_ACUITY_SCORE: 35

## 2023-07-20 NOTE — ED PROVIDER NOTES
EMERGENCY DEPARTMENT ENCOUNTER      NAME: Tia Brooke  AGE: 89 year old female  YOB: 1932  MRN: [id number]  EVALUATION DATE & TIME: 7/20/2023  8:12 AM    PCP: Sunil Brown    ED PROVIDER: Raphael Pires MD    Chief Complaint   Patient presents with     Fall     FINAL IMPRESSION:  1. Fall in home, initial encounter    2. Contusion of left knee, initial encounter    3. Contusion of right shoulder, initial encounter      ED COURSE & MEDICAL DECISION MAKING:    Pertinent Labs & Imaging studies reviewed. (See chart for details)  89 year old community dwelling female on Eliquis presents to the Emergency Department for evaluation of shoulder pain following unwitnessed mechanical fall at home.  No loss of consciousness or other cardiovascular or neurological red flags.  After a thorough evaluation to rule out intracranial hemorrhage or fractures, and an ambulation test with her walker, she was discharged home.    ED Course as of 07/20/23 1312   Thu Jul 20, 2023   0917 CT Head w/o Contrast  Head CT negative.   0917 XR Shoulder Right 2 Views  Right shoulder x-ray shows no fractures.   0920 XR Knee Left 3 Views   No fracture. Chondrocalcinosis. Mild patellofemoral degenerative arthritis. No effusion. Arterial calcification.   0941 Basic metabolic panel   0942 CBC with platelets differential(!)  Hemoglobin 12.5.  BMP and CBC unremarkable except very mild leukocytosis 11.6.   0945 ECG 12-LEAD WITH MUSE (LHE)  ECG without acute ischemic changes.   1031 CT Cervical Spine w/o Contrast  1.  No evidence for fracture or traumatic malalignment of the cervical spine.  2.  Diffuse degenerative change of the cervical spine again noted without change from the comparison study.  3.  No spinal canal stenosis of the cervical spine.     At the conclusion of the encounter I discussed the results of all of the tests and the disposition. The questions were answered. The patient or family acknowledged understanding  and was agreeable with the care plan.     MEDICATIONS GIVEN IN THE EMERGENCY:  Medications   lidocaine 1 % 0.1-1 mL (has no administration in time range)   lidocaine (LMX4) cream (has no administration in time range)   sodium chloride (PF) 0.9% PF flush 3 mL (has no administration in time range)   sodium chloride (PF) 0.9% PF flush 3 mL (has no administration in time range)   acetaminophen (TYLENOL) tablet 650 mg (650 mg Oral $Given 7/20/23 0911)   oxyCODONE (ROXICODONE) tablet 5 mg (5 mg Oral $Given 7/20/23 1112)       NEW PRESCRIPTIONS STARTED AT TODAY'S ER VISIT  New Prescriptions    No medications on file          =================================================================    HPI    Patient information was obtained from: Patient, gilda Brooke is a 89 year old female on [facility] who presents to the Emergency Department following mechanical fall at home.  She lives in a 1 story house with her son and ambulates with a walker; she is independent in ADLs at baseline.  She struck her right shoulder on an end table.  She does not recall whether she hit her head, but her neck is stiff and sore.  Her right shoulder hurts and she has lumbar back pain.      REVIEW OF SYSTEMS   Review of Systems   Respiratory: Negative for chest tightness and shortness of breath.    Cardiovascular: Positive for leg swelling. Negative for chest pain and palpitations.   Musculoskeletal: Positive for back pain and neck pain.   Neurological: Negative for dizziness, seizures, syncope, facial asymmetry, weakness, light-headedness, numbness and headaches.        PAST MEDICAL HISTORY:  No past medical history on file.    PAST SURGICAL HISTORY:  Past Surgical History:   Procedure Laterality Date     COLECTOMY PARTIAL  11/01/2004    Sigmoid colectomy for adenocarcinoma     PHACOEMULSIFICATION CLEAR CORNEA WITH STANDARD INTRAOCULAR LENS IMPLANT Bilateral      TOTAL HIP ARTHROPLASTY Right 12/31/2009    Dr. Gurpreet Ho  "          CURRENT MEDICATIONS:    acetaminophen (TYLENOL) 325 MG tablet  albuterol (PROAIR HFA) 90 mcg/actuation inhaler  apixaban ANTICOAGULANT (ELIQUIS) 5 MG tablet  atorvastatin (LIPITOR) 20 MG tablet  CALCIUM CARBONATE/VITAMIN D3 (CALCIUM 600 + D,3, ORAL)  cholecalciferol, vitamin D3, 1,000 unit tablet  fluticasone (ARNUITY ELLIPTA) 100 MCG/ACT inhaler  furosemide (LASIX) 20 MG tablet  gabapentin (NEURONTIN) 100 MG capsule  GLUC DELGADO/CHONDRO DELGADO A/VIT C/MN (GLUCOSAMINE 1500 COMPLEX ORAL)  ipratropium-albuterol (DUO-NEB) 0.5-2.5 mg/3 mL nebulizer  Lidocaine (LIDOCARE) 4 % Patch  methocarbamol (ROBAXIN) 500 MG tablet  metoprolol tartrate (LOPRESSOR) 25 MG tablet  montelukast (SINGULAIR) 10 mg tablet  omega-3 fatty acids-fish oil (FISH OIL) 360-1,200 mg cap  oxyCODONE (ROXICODONE) 5 MG tablet  senna-docusate (SENOKOT-S/PERICOLACE) 8.6-50 MG tablet  traZODone (DESYREL) 50 MG tablet        ALLERGIES:  No Known Allergies    FAMILY HISTORY:  Family History   Problem Relation Age of Onset     Leukemia Mother      Heart Failure Mother          age 52 of \"enlarged heart\"     Chronic Obstructive Pulmonary Disease Father      Heart Disease Brother          age 62, unspecified heart problems     Hypertension Daughter      Hyperlipidemia Daughter      Atrial fibrillation Daughter        SOCIAL HISTORY:   Social History     Socioeconomic History     Marital status:      Number of children: 4   Tobacco Use     Smoking status: Never     Smokeless tobacco: Never   Substance and Sexual Activity     Alcohol use: Yes     Comment: Alcoholic Drinks/day: 0-1 per month     Drug use: No     Sexual activity: Never   Social History Narrative    ;   in 1970's age 44, had open heart surgery then brain aneurysm.  2 daughters (Genesis Tello and Alda Tello), 2 sons.  Granddaughter Jocelin Curry is an RN.       VITALS:  /60   Pulse 54   Temp 98.3  F (36.8  C) (Oral)   Resp 22   SpO2 96%     PHYSICAL " EXAM    Constitutional: alert, no distress, pleasant and cooperative   Cardiovascular: regular rate and rhythm, no murmurs/rubs/gallops, extremities warm and well perfused, BLE edema to mid shins  Respiratory: normal respiratory rate/rhythm/effort, all lung fields CTAB, speaks in complete sentences  Abdomen: Abdomen soft and without distension or tenderness. No palpable masses or organomegaly. No scars, striae, dilated veins, rashes, or lesions  Neuro: Sensation intact to light touch in all dermatomes of all 4 extremities.  Shoulder adduction and abduction strength 5 out of 5 bilaterally.  Elbow, wrist, knee, and ankle flexion and extension strength 5 out of 5 bilaterally.    Skin: no suspicious lesions or rashes  MSK: Right anterior humeral head and left patella tender to palpation and mildly erythematous.  Otherwise no tenderness, crepitus, step-off, or deformities noted.  Psychiatric: mentation and affect normal; judgment and insight intact     LAB:  All pertinent labs reviewed and interpreted.  Results for orders placed or performed during the hospital encounter of 07/20/23   CT Head w/o Contrast    Impression    IMPRESSION:  1.  No CT evidence for acute intracranial process.  2.  Brain atrophy and presumed chronic microvascular ischemic changes as above.   XR Shoulder Right 2 Views    Impression    IMPRESSION: Normal alignment. No acute fracture. Mild degenerative arthrosis AC joint. Subacromial spurring. Degenerative changes in the visualized spine.    Patchy infiltrate versus fibrosis or atelectasis right lung with likely a small right pleural effusion. Recommend chest x-ray correlation.   XR Knee Left 3 Views    Impression    IMPRESSION: No fracture. Chondrocalcinosis. Mild patellofemoral degenerative arthritis. No effusion. Arterial calcification.   CT Cervical Spine w/o Contrast    Impression    IMPRESSION:  1.  No evidence for fracture or traumatic malalignment of the cervical spine.  2.  Diffuse  degenerative change of the cervical spine again noted without change from the comparison study.  3.  No spinal canal stenosis of the cervical spine.   XR Pelvis and Hip Left 2 Views    Impression    IMPRESSION: Subtle lucency in the left intertrochanteric region. Findings are worrisome for a nondisplaced fracture. Osteopenia. Advanced degenerative changes in the left hip. Right ROCHELLE. Components are well seated. Degenerative changes lower lumbar   spine.   XR Hip Right 2-3 Views    Impression    IMPRESSION: Subtle lucency in the left intertrochanteric region. Findings are worrisome for a nondisplaced fracture. Osteopenia. Advanced degenerative changes in the left hip. Right ROCHELLE. Components are well seated. Degenerative changes lower lumbar   spine.   CT Hip Left w/o Contrast    Impression    IMPRESSION:  1.  No CT evidence for acute displaced left hip fracture. Specifically, no CT evidence for intertrochanteric left proximal femur fracture. Left hip MRI could further assess for occult fracture.  2.  Severe left hip osteoarthritis with left hip joint effusion.  3.  Diffuse bone demineralization.     CBC with platelets and differential   Result Value Ref Range    WBC Count 11.6 (H) 4.0 - 11.0 10e3/uL    RBC Count 3.85 3.80 - 5.20 10e6/uL    Hemoglobin 12.5 11.7 - 15.7 g/dL    Hematocrit 38.1 35.0 - 47.0 %    MCV 99 78 - 100 fL    MCH 32.5 26.5 - 33.0 pg    MCHC 32.8 31.5 - 36.5 g/dL    RDW 12.1 10.0 - 15.0 %    Platelet Count 278 150 - 450 10e3/uL    % Neutrophils 68 %    % Lymphocytes 27 %    % Monocytes 5 %    % Eosinophils 0 %    % Basophils 0 %    % Immature Granulocytes 0 %    NRBCs per 100 WBC 0 <1 /100    Absolute Neutrophils 7.8 1.6 - 8.3 10e3/uL    Absolute Lymphocytes 3.2 0.8 - 5.3 10e3/uL    Absolute Monocytes 0.6 0.0 - 1.3 10e3/uL    Absolute Eosinophils 0.0 0.0 - 0.7 10e3/uL    Absolute Basophils 0.0 0.0 - 0.2 10e3/uL    Absolute Immature Granulocytes 0.0 <=0.4 10e3/uL    Absolute NRBCs 0.0 10e3/uL   Basic  metabolic panel   Result Value Ref Range    Sodium 140 136 - 145 mmol/L    Potassium 4.0 3.5 - 5.0 mmol/L    Chloride 102 98 - 107 mmol/L    Carbon Dioxide (CO2) 28 22 - 31 mmol/L    Anion Gap 10 5 - 18 mmol/L    Urea Nitrogen 14 8 - 28 mg/dL    Creatinine 0.84 0.60 - 1.10 mg/dL    Calcium 10.2 8.5 - 10.5 mg/dL    Glucose 100 70 - 125 mg/dL    GFR Estimate 65 >60 mL/min/1.73m2   ECG 12-LEAD WITH MUSE (LHE)   Result Value Ref Range    Systolic Blood Pressure 147 mmHg    Diastolic Blood Pressure 66 mmHg    Ventricular Rate 56 BPM    Atrial Rate 56 BPM    NH Interval 148 ms    QRS Duration 88 ms     ms    QTc 418 ms    P Axis  degrees    R AXIS -13 degrees    T Axis 31 degrees    Interpretation ECG       Sinus bradycardia  Nonspecific ST and T wave abnormality  Abnormal ECG  When compared with ECG of 27-APR-2023 15:33,  Sinus rhythm has replaced Junctional rhythm  Confirmed by SEE ED PROVIDER NOTE FOR, ECG INTERPRETATION ([id number]),  FRANCISCO JAVIER MOORE ([id number]) on 7/20/2023 10:10:06 AM         RADIOLOGY:  Reviewed all pertinent imaging. Please see official radiology report.  CT Hip Left w/o Contrast   Final Result   IMPRESSION:   1.  No CT evidence for acute displaced left hip fracture. Specifically, no CT evidence for intertrochanteric left proximal femur fracture. Left hip MRI could further assess for occult fracture.   2.  Severe left hip osteoarthritis with left hip joint effusion.   3.  Diffuse bone demineralization.         XR Hip Right 2-3 Views   Final Result   IMPRESSION: Subtle lucency in the left intertrochanteric region. Findings are worrisome for a nondisplaced fracture. Osteopenia. Advanced degenerative changes in the left hip. Right ROCHELLE. Components are well seated. Degenerative changes lower lumbar    spine.      XR Pelvis and Hip Left 2 Views   Final Result   IMPRESSION: Subtle lucency in the left intertrochanteric region. Findings are worrisome for a nondisplaced fracture. Osteopenia. Advanced  degenerative changes in the left hip. Right ROCHELLE. Components are well seated. Degenerative changes lower lumbar    spine.      CT Cervical Spine w/o Contrast   Final Result   IMPRESSION:   1.  No evidence for fracture or traumatic malalignment of the cervical spine.   2.  Diffuse degenerative change of the cervical spine again noted without change from the comparison study.   3.  No spinal canal stenosis of the cervical spine.      XR Shoulder Right 2 Views   Final Result   IMPRESSION: Normal alignment. No acute fracture. Mild degenerative arthrosis AC joint. Subacromial spurring. Degenerative changes in the visualized spine.      Patchy infiltrate versus fibrosis or atelectasis right lung with likely a small right pleural effusion. Recommend chest x-ray correlation.      XR Knee Left 3 Views   Final Result   IMPRESSION: No fracture. Chondrocalcinosis. Mild patellofemoral degenerative arthritis. No effusion. Arterial calcification.      CT Head w/o Contrast   Final Result   IMPRESSION:   1.  No CT evidence for acute intracranial process.   2.  Brain atrophy and presumed chronic microvascular ischemic changes as above.          EKG:    Performed at: 0803    Impression: Sinus bradycardia    Rate: 56  Rhythm: Sinus rhythm  Axis: Normal axis  KY Interval: 148  QRS Interval: 88  QTc Interval: 418  ST Changes: T wave inversions in V1 and V2, not new  Comparison: April 27, 2023    I have independently reviewed and interpreted the EKG(s) documented above.         Patient seen and discussed with Dr. Pires, who agrees with the above assessment and plan    Vitor Patiño MD, MEd  Resident Physician (PGY-1)  [facility] Medicine  Pager: [phone] (7:30am - 5:30pm)     Vitor Patiño MD  Resident  07/20/23 [id number]

## 2023-07-20 NOTE — ED NOTES
Bed: [facility]  Expected date:   Expected time:   Means of arrival:   Comments:  [facility]   89 female  Fall  right shoulder pain on Eloquis

## 2023-07-20 NOTE — ED TRIAGE NOTES
Tripped on piece of clothing last night.  Unwitnessed, denies LOC.  C/O rt shoulder pain.  CMS intact. No obvious deformity.  On Eliquis.Presents A/Ox4. Has redness on rt side of face, and left knee. Some neck pain on movement.

## 2023-07-20 NOTE — DISCHARGE INSTRUCTIONS
Ensure that home is free of clutter.  Remove any area rugs, throw rugs, floor mats, and other tripping hazards.  Use night lights to keep route to bathroom illuminated at night.

## 2023-07-20 NOTE — ED PROVIDER NOTES
Emergency Department Midlevel Supervisory Note     I personally saw the patient and performed a substantive portion of the visit including all aspects of the medical decision making.    ED Course:  8:20 AM Vitor Horne MD staffed patient with me. I agree with their assessment and plan of management, and I will see the patient.   I met with the patient to introduce myself, gather additional history, perform my initial exam, and discuss the plan.     Brief HPI:     Tia Brooke is a 89 year old female who presents for evaluation of fall.    I, Abigail Stock , am serving as a scribe to document services personally performed by Raphael Pires MD, based on my observations and the provider's statements to me.   I, Raphael Pires MD, attest that Abigail Stock  was acting in a scribe capacity, has observed my performance of the services and has documented them in accordance with my direction.    Brief Physical Exam:  Constitutional:  Alert, in no acute distress  EYES: Conjunctivae clear  HENT:  Atraumatic, normocephalic  Respiratory:  Respirations even, unlabored, in no acute respiratory distress  Cardiovascular:  Regular rate and rhythm, good peripheral perfusion  GI: Soft, nondistended, nontender, no palpable masses, no rebound, no guarding   Musculoskeletal:  No edema. No cyanosis. Range of motion major extremities intact.    Integument: Warm, Dry, No erythema, No rash.   Neurologic:  Alert & oriented, no focal deficits noted  Psych: Normal mood and affect     MDM:    ED Course as of 07/20/23 1450   Thu Jul 20, 2023   0917  patient is a 89-year-old woman who had mechanical fall at home, has soreness to her bilateral hips, right shoulder.  Does not think she hit her head.  She does have neck tenderness.  C-collar was placed.  She is mentating normally.  X-rays of the hips, right shoulder be ordered, CT of the head and C-spine will be ordered.      CT Head w/o Contrast  Head CT negative.   0917 XR Shoulder  Right 2 Views  Right shoulder x-ray shows no fractures.   0920 XR Knee Left 3 Views   No fracture. Chondrocalcinosis. Mild patellofemoral degenerative arthritis. No effusion. Arterial calcification.   0941 Basic metabolic panel   0942 CBC with platelets differential(!)  Hemoglobin 12.5.  BMP and CBC unremarkable except very mild leukocytosis 11.6.   0945 ECG 12-LEAD WITH MUSE ([facility])  ECG without acute ischemic changes.   1031 CT Cervical Spine w/o Contrast  1.  No evidence for fracture or traumatic malalignment of the cervical spine.  2.  Diffuse degenerative change of the cervical spine again noted without change from the comparison study.  3.  No spinal canal stenosis of the cervical spine.     Reassuring imaging, patient was able to ambulate with a walker at baseline.  Family is comfortable with her going home as is the patient.  She has son at home to help her with ADLs.      1. Fall in home, initial encounter    2. Contusion of left knee, initial encounter    3. Contusion of right shoulder, initial encounter        Labs and Imaging:  Results for orders placed or performed during the hospital encounter of 07/20/23   CT Head w/o Contrast    Impression    IMPRESSION:  1.  No CT evidence for acute intracranial process.  2.  Brain atrophy and presumed chronic microvascular ischemic changes as above.   XR Shoulder Right 2 Views    Impression    IMPRESSION: Normal alignment. No acute fracture. Mild degenerative arthrosis AC joint. Subacromial spurring. Degenerative changes in the visualized spine.    Patchy infiltrate versus fibrosis or atelectasis right lung with likely a small right pleural effusion. Recommend chest x-ray correlation.   XR Knee Left 3 Views    Impression    IMPRESSION: No fracture. Chondrocalcinosis. Mild patellofemoral degenerative arthritis. No effusion. Arterial calcification.   CT Cervical Spine w/o Contrast    Impression    IMPRESSION:  1.  No evidence for fracture or traumatic malalignment of  the cervical spine.  2.  Diffuse degenerative change of the cervical spine again noted without change from the comparison study.  3.  No spinal canal stenosis of the cervical spine.   XR Pelvis and Hip Left 2 Views    Impression    IMPRESSION: Subtle lucency in the left intertrochanteric region. Findings are worrisome for a nondisplaced fracture. Osteopenia. Advanced degenerative changes in the left hip. Right ROCHELLE. Components are well seated. Degenerative changes lower lumbar   spine.   XR Hip Right 2-3 Views    Impression    IMPRESSION: Subtle lucency in the left intertrochanteric region. Findings are worrisome for a nondisplaced fracture. Osteopenia. Advanced degenerative changes in the left hip. Right ROCHELLE. Components are well seated. Degenerative changes lower lumbar   spine.   CT Hip Left w/o Contrast    Impression    IMPRESSION:  1.  No CT evidence for acute displaced left hip fracture. Specifically, no CT evidence for intertrochanteric left proximal femur fracture. Left hip MRI could further assess for occult fracture.  2.  Severe left hip osteoarthritis with left hip joint effusion.  3.  Diffuse bone demineralization.     CBC with platelets and differential   Result Value Ref Range    WBC Count 11.6 (H) 4.0 - 11.0 10e3/uL    RBC Count 3.85 3.80 - 5.20 10e6/uL    Hemoglobin 12.5 11.7 - 15.7 g/dL    Hematocrit 38.1 35.0 - 47.0 %    MCV 99 78 - 100 fL    MCH 32.5 26.5 - 33.0 pg    MCHC 32.8 31.5 - 36.5 g/dL    RDW 12.1 10.0 - 15.0 %    Platelet Count 278 150 - 450 10e3/uL    % Neutrophils 68 %    % Lymphocytes 27 %    % Monocytes 5 %    % Eosinophils 0 %    % Basophils 0 %    % Immature Granulocytes 0 %    NRBCs per 100 WBC 0 <1 /100    Absolute Neutrophils 7.8 1.6 - 8.3 10e3/uL    Absolute Lymphocytes 3.2 0.8 - 5.3 10e3/uL    Absolute Monocytes 0.6 0.0 - 1.3 10e3/uL    Absolute Eosinophils 0.0 0.0 - 0.7 10e3/uL    Absolute Basophils 0.0 0.0 - 0.2 10e3/uL    Absolute Immature Granulocytes 0.0 <=0.4 10e3/uL     Absolute NRBCs 0.0 10e3/uL   Basic metabolic panel   Result Value Ref Range    Sodium 140 136 - 145 mmol/L    Potassium 4.0 3.5 - 5.0 mmol/L    Chloride 102 98 - 107 mmol/L    Carbon Dioxide (CO2) 28 22 - 31 mmol/L    Anion Gap 10 5 - 18 mmol/L    Urea Nitrogen 14 8 - 28 mg/dL    Creatinine 0.84 0.60 - 1.10 mg/dL    Calcium 10.2 8.5 - 10.5 mg/dL    Glucose 100 70 - 125 mg/dL    GFR Estimate 65 >60 mL/min/1.73m2   ECG 12-LEAD WITH MUSE ([facility])   Result Value Ref Range    Systolic Blood Pressure 147 mmHg    Diastolic Blood Pressure 66 mmHg    Ventricular Rate 56 BPM    Atrial Rate 56 BPM    GA Interval 148 ms    QRS Duration 88 ms     ms    QTc 418 ms    P Axis  degrees    R AXIS -13 degrees    T Axis 31 degrees    Interpretation ECG       Sinus bradycardia  Nonspecific ST and T wave abnormality  Abnormal ECG  When compared with ECG of 27-APR-2023 15:33,  Sinus rhythm has replaced Junctional rhythm  Confirmed by SEE ED PROVIDER NOTE FOR, ECG INTERPRETATION ([id number]),  FRANCISCO JAVIER MOORE ([id number]) on 7/20/2023 10:10:06 AM       I have reviewed the relevant laboratory and radiology studies    Procedures:  I was present for the key portions of this procedure: none    Raphael Pires MD  [facility] EMERGENCY ROOM  [id number] [place] [id number] [phone]         Raphael Pires MD  07/20/23 5248

## 2023-10-14 ENCOUNTER — HEALTH MAINTENANCE LETTER (OUTPATIENT)
Age: 88
End: 2023-10-14

## 2024-12-07 ENCOUNTER — HEALTH MAINTENANCE LETTER (OUTPATIENT)
Age: 89
End: 2024-12-07

## 2025-04-23 ENCOUNTER — MEDICAL CORRESPONDENCE (OUTPATIENT)
Dept: HEALTH INFORMATION MANAGEMENT | Facility: CLINIC | Age: OVER 89
End: 2025-04-23

## 2025-08-18 ENCOUNTER — HOSPITAL ENCOUNTER (EMERGENCY)
Facility: CLINIC | Age: OVER 89
Discharge: ANOTHER HOSPITAL | End: 2025-08-19
Attending: EMERGENCY MEDICINE | Admitting: EMERGENCY MEDICINE
Payer: MEDICARE

## 2025-08-18 ENCOUNTER — APPOINTMENT (OUTPATIENT)
Dept: CT IMAGING | Facility: CLINIC | Age: OVER 89
End: 2025-08-18
Attending: EMERGENCY MEDICINE
Payer: MEDICARE

## 2025-08-18 ENCOUNTER — APPOINTMENT (OUTPATIENT)
Dept: MRI IMAGING | Facility: CLINIC | Age: OVER 89
End: 2025-08-18
Attending: EMERGENCY MEDICINE
Payer: MEDICARE

## 2025-08-18 ENCOUNTER — APPOINTMENT (OUTPATIENT)
Dept: RADIOLOGY | Facility: CLINIC | Age: OVER 89
End: 2025-08-18
Attending: EMERGENCY MEDICINE
Payer: MEDICARE

## 2025-08-18 DIAGNOSIS — G93.40 ACUTE ENCEPHALOPATHY: ICD-10-CM

## 2025-08-18 DIAGNOSIS — R22.0 INTRACRANIAL SWELLING: Primary | ICD-10-CM

## 2025-08-18 LAB
ALBUMIN UR-MCNC: 10 MG/DL
ANION GAP SERPL CALCULATED.3IONS-SCNC: 14 MMOL/L (ref 7–15)
APPEARANCE CSF: CLEAR
APPEARANCE UR: CLEAR
ATRIAL RATE - MUSE: 58 BPM
BILIRUB UR QL STRIP: NEGATIVE
BUN SERPL-MCNC: 19.4 MG/DL (ref 8–23)
CALCIUM SERPL-MCNC: 9.9 MG/DL (ref 8.8–10.4)
CHLORIDE SERPL-SCNC: 102 MMOL/L (ref 98–107)
COLOR CSF: COLORLESS
COLOR UR AUTO: ABNORMAL
CREAT SERPL-MCNC: 0.89 MG/DL (ref 0.51–0.95)
DIASTOLIC BLOOD PRESSURE - MUSE: 72 MMHG
EGFRCR SERPLBLD CKD-EPI 2021: 60 ML/MIN/1.73M2
ERYTHROCYTE [DISTWIDTH] IN BLOOD BY AUTOMATED COUNT: 13.5 % (ref 10–15)
FLUAV RNA SPEC QL NAA+PROBE: NEGATIVE
FLUBV RNA RESP QL NAA+PROBE: NEGATIVE
GLUCOSE CSF-MCNC: 61 MG/DL (ref 40–70)
GLUCOSE SERPL-MCNC: 112 MG/DL (ref 70–99)
GLUCOSE UR STRIP-MCNC: NEGATIVE MG/DL
HCO3 SERPL-SCNC: 23 MMOL/L (ref 22–29)
HCT VFR BLD AUTO: 38.3 % (ref 35–47)
HGB BLD-MCNC: 12.9 G/DL (ref 11.7–15.7)
HGB UR QL STRIP: NEGATIVE
INTERPRETATION ECG - MUSE: NORMAL
KETONES UR STRIP-MCNC: 20 MG/DL
LEUKOCYTE ESTERASE UR QL STRIP: ABNORMAL
MCH RBC QN AUTO: 33.3 PG (ref 26.5–33)
MCHC RBC AUTO-ENTMCNC: 33.7 G/DL (ref 31.5–36.5)
MCV RBC AUTO: 99 FL (ref 78–100)
MUCOUS THREADS #/AREA URNS LPF: PRESENT /LPF
NITRATE UR QL: NEGATIVE
NT-PROBNP SERPL-MCNC: 457 PG/ML (ref 0–624)
P AXIS - MUSE: NORMAL DEGREES
PH UR STRIP: 7.5 [PH] (ref 5–7)
PLATELET # BLD AUTO: 294 10E3/UL (ref 150–450)
POTASSIUM SERPL-SCNC: 3.7 MMOL/L (ref 3.4–5.3)
PR INTERVAL - MUSE: 148 MS
PROT CSF-MCNC: 58.3 MG/DL (ref 15–45)
QRS DURATION - MUSE: 78 MS
QT - MUSE: 436 MS
QTC - MUSE: 428 MS
R AXIS - MUSE: -25 DEGREES
RBC # BLD AUTO: 3.87 10E6/UL (ref 3.8–5.2)
RBC # CSF MANUAL: 4 /UL (ref 0–2)
RBC URINE: 2 /HPF
RSV RNA SPEC NAA+PROBE: NEGATIVE
SARS-COV-2 RNA RESP QL NAA+PROBE: NEGATIVE
SODIUM SERPL-SCNC: 139 MMOL/L (ref 135–145)
SP GR UR STRIP: 1.03 (ref 1–1.03)
SQUAMOUS EPITHELIAL: 3 /HPF
SYSTOLIC BLOOD PRESSURE - MUSE: 173 MMHG
T AXIS - MUSE: 11 DEGREES
TROPONIN T SERPL HS-MCNC: 32 NG/L
TROPONIN T SERPL HS-MCNC: 34 NG/L
TUBE # CSF: 4
UROBILINOGEN UR STRIP-MCNC: NORMAL MG/DL
VENTRICULAR RATE- MUSE: 58 BPM
WBC # BLD AUTO: 7.85 10E3/UL (ref 4–11)
WBC # CSF MANUAL: 4 /UL (ref 0–5)
WBC URINE: 5 /HPF

## 2025-08-18 PROCEDURE — 71046 X-RAY EXAM CHEST 2 VIEWS: CPT

## 2025-08-18 PROCEDURE — 70551 MRI BRAIN STEM W/O DYE: CPT

## 2025-08-18 PROCEDURE — 96365 THER/PROPH/DIAG IV INF INIT: CPT

## 2025-08-18 PROCEDURE — 36415 COLL VENOUS BLD VENIPUNCTURE: CPT | Performed by: EMERGENCY MEDICINE

## 2025-08-18 PROCEDURE — 83880 ASSAY OF NATRIURETIC PEPTIDE: CPT | Performed by: EMERGENCY MEDICINE

## 2025-08-18 PROCEDURE — 93005 ELECTROCARDIOGRAM TRACING: CPT | Performed by: EMERGENCY MEDICINE

## 2025-08-18 PROCEDURE — 87205 SMEAR GRAM STAIN: CPT | Performed by: EMERGENCY MEDICINE

## 2025-08-18 PROCEDURE — 89050 BODY FLUID CELL COUNT: CPT | Performed by: EMERGENCY MEDICINE

## 2025-08-18 PROCEDURE — 250N000011 HC RX IP 250 OP 636: Performed by: EMERGENCY MEDICINE

## 2025-08-18 PROCEDURE — 96375 TX/PRO/DX INJ NEW DRUG ADDON: CPT | Mod: 59

## 2025-08-18 PROCEDURE — 96367 TX/PROPH/DG ADDL SEQ IV INF: CPT

## 2025-08-18 PROCEDURE — 80048 BASIC METABOLIC PNL TOTAL CA: CPT | Performed by: EMERGENCY MEDICINE

## 2025-08-18 PROCEDURE — 99285 EMERGENCY DEPT VISIT HI MDM: CPT | Mod: 25 | Performed by: EMERGENCY MEDICINE

## 2025-08-18 PROCEDURE — 87637 SARSCOV2&INF A&B&RSV AMP PRB: CPT | Performed by: EMERGENCY MEDICINE

## 2025-08-18 PROCEDURE — 258N000003 HC RX IP 258 OP 636: Performed by: EMERGENCY MEDICINE

## 2025-08-18 PROCEDURE — 82945 GLUCOSE OTHER FLUID: CPT | Performed by: EMERGENCY MEDICINE

## 2025-08-18 PROCEDURE — 85027 COMPLETE CBC AUTOMATED: CPT | Performed by: EMERGENCY MEDICINE

## 2025-08-18 PROCEDURE — 250N000013 HC RX MED GY IP 250 OP 250 PS 637: Performed by: EMERGENCY MEDICINE

## 2025-08-18 PROCEDURE — 87483 CNS DNA AMP PROBE TYPE 12-25: CPT | Performed by: EMERGENCY MEDICINE

## 2025-08-18 PROCEDURE — 70544 MR ANGIOGRAPHY HEAD W/O DYE: CPT

## 2025-08-18 PROCEDURE — 62270 DX LMBR SPI PNXR: CPT

## 2025-08-18 PROCEDURE — 87070 CULTURE OTHR SPECIMN AEROBIC: CPT | Performed by: EMERGENCY MEDICINE

## 2025-08-18 PROCEDURE — 99291 CRITICAL CARE FIRST HOUR: CPT | Mod: 25

## 2025-08-18 PROCEDURE — 84484 ASSAY OF TROPONIN QUANT: CPT | Performed by: EMERGENCY MEDICINE

## 2025-08-18 PROCEDURE — 70547 MR ANGIOGRAPHY NECK W/O DYE: CPT

## 2025-08-18 PROCEDURE — 96376 TX/PRO/DX INJ SAME DRUG ADON: CPT

## 2025-08-18 PROCEDURE — 84157 ASSAY OF PROTEIN OTHER: CPT | Performed by: EMERGENCY MEDICINE

## 2025-08-18 PROCEDURE — 96361 HYDRATE IV INFUSION ADD-ON: CPT

## 2025-08-18 PROCEDURE — 70450 CT HEAD/BRAIN W/O DYE: CPT

## 2025-08-18 PROCEDURE — 81003 URINALYSIS AUTO W/O SCOPE: CPT | Performed by: EMERGENCY MEDICINE

## 2025-08-18 RX ORDER — ONDANSETRON 2 MG/ML
4 INJECTION INTRAMUSCULAR; INTRAVENOUS ONCE
Status: COMPLETED | OUTPATIENT
Start: 2025-08-18 | End: 2025-08-18

## 2025-08-18 RX ORDER — ASPIRIN 81 MG/1
81 TABLET, COATED ORAL
COMMUNITY
Start: 2023-12-04

## 2025-08-18 RX ORDER — ACETAMINOPHEN 325 MG/1
650 TABLET ORAL EVERY 6 HOURS PRN
Status: DISCONTINUED | OUTPATIENT
Start: 2025-08-18 | End: 2025-08-19 | Stop reason: HOSPADM

## 2025-08-18 RX ORDER — MONTELUKAST SODIUM 10 MG/1
10 TABLET ORAL AT BEDTIME
Status: DISCONTINUED | OUTPATIENT
Start: 2025-08-18 | End: 2025-08-19 | Stop reason: HOSPADM

## 2025-08-18 RX ORDER — CEFTRIAXONE 2 G/1
2 INJECTION, POWDER, FOR SOLUTION INTRAMUSCULAR; INTRAVENOUS ONCE
Status: COMPLETED | OUTPATIENT
Start: 2025-08-18 | End: 2025-08-18

## 2025-08-18 RX ORDER — FUROSEMIDE 20 MG/1
20 TABLET ORAL EVERY MORNING
Status: DISCONTINUED | OUTPATIENT
Start: 2025-08-19 | End: 2025-08-19 | Stop reason: HOSPADM

## 2025-08-18 RX ORDER — METOPROLOL TARTRATE 25 MG/1
25 TABLET, FILM COATED ORAL 2 TIMES DAILY
Status: DISCONTINUED | OUTPATIENT
Start: 2025-08-18 | End: 2025-08-19 | Stop reason: HOSPADM

## 2025-08-18 RX ORDER — ACETAMINOPHEN 325 MG/1
325-650 TABLET ORAL EVERY 6 HOURS PRN
COMMUNITY

## 2025-08-18 RX ORDER — MOMETASONE FUROATE 100 UG/1
2 AEROSOL RESPIRATORY (INHALATION) 2 TIMES DAILY
COMMUNITY
Start: 2025-08-08

## 2025-08-18 RX ORDER — ASPIRIN 81 MG/1
81 TABLET ORAL DAILY
Status: DISCONTINUED | OUTPATIENT
Start: 2025-08-19 | End: 2025-08-19 | Stop reason: HOSPADM

## 2025-08-18 RX ADMIN — METOPROLOL TARTRATE 25 MG: 25 TABLET, FILM COATED ORAL at 20:41

## 2025-08-18 RX ADMIN — ONDANSETRON 4 MG: 2 INJECTION, SOLUTION INTRAMUSCULAR; INTRAVENOUS at 14:07

## 2025-08-18 RX ADMIN — ONDANSETRON 4 MG: 2 INJECTION, SOLUTION INTRAMUSCULAR; INTRAVENOUS at 13:18

## 2025-08-18 RX ADMIN — MIDAZOLAM HYDROCHLORIDE 1 MG: 1 INJECTION, SOLUTION INTRAMUSCULAR; INTRAVENOUS at 17:48

## 2025-08-18 RX ADMIN — MONTELUKAST 10 MG: 10 TABLET, FILM COATED ORAL at 20:41

## 2025-08-18 RX ADMIN — ACETAMINOPHEN 650 MG: 325 TABLET ORAL at 20:15

## 2025-08-18 RX ADMIN — SODIUM CHLORIDE 1000 ML: 0.9 INJECTION, SOLUTION INTRAVENOUS at 20:18

## 2025-08-18 RX ADMIN — ACYCLOVIR SODIUM 750 MG: 50 INJECTION, SOLUTION INTRAVENOUS at 20:46

## 2025-08-18 RX ADMIN — CEFTRIAXONE SODIUM 2 G: 2 INJECTION, POWDER, FOR SOLUTION INTRAMUSCULAR; INTRAVENOUS at 20:18

## 2025-08-18 ASSESSMENT — ACTIVITIES OF DAILY LIVING (ADL)
ADLS_ACUITY_SCORE: 55

## 2025-08-18 ASSESSMENT — COLUMBIA-SUICIDE SEVERITY RATING SCALE - C-SSRS
1. IN THE PAST MONTH, HAVE YOU WISHED YOU WERE DEAD OR WISHED YOU COULD GO TO SLEEP AND NOT WAKE UP?: NO
2. HAVE YOU ACTUALLY HAD ANY THOUGHTS OF KILLING YOURSELF IN THE PAST MONTH?: NO
6. HAVE YOU EVER DONE ANYTHING, STARTED TO DO ANYTHING, OR PREPARED TO DO ANYTHING TO END YOUR LIFE?: NO

## 2025-08-19 VITALS
DIASTOLIC BLOOD PRESSURE: 83 MMHG | WEIGHT: 162 LBS | RESPIRATION RATE: 24 BRPM | OXYGEN SATURATION: 94 % | HEART RATE: 48 BPM | TEMPERATURE: 97.6 F | SYSTOLIC BLOOD PRESSURE: 152 MMHG | BODY MASS INDEX: 29.81 KG/M2 | HEIGHT: 62 IN

## 2025-08-19 LAB
C GATTII+NEOFOR DNA CSF QL NAA+NON-PROBE: NEGATIVE
CMV DNA CSF QL NAA+NON-PROBE: NEGATIVE
E COLI K1 AG CSF QL: NEGATIVE
EV RNA SPEC QL NAA+PROBE: NEGATIVE
GP B STREP DNA CSF QL NAA+NON-PROBE: NEGATIVE
HAEM INFLU DNA CSF QL NAA+NON-PROBE: NEGATIVE
HHV6 DNA CSF QL NAA+NON-PROBE: NEGATIVE
HSV1 DNA CSF QL NAA+NON-PROBE: NEGATIVE
HSV2 DNA CSF QL NAA+NON-PROBE: NEGATIVE
L MONOCYTOG DNA CSF QL NAA+NON-PROBE: NEGATIVE
N MEN DNA CSF QL NAA+NON-PROBE: NEGATIVE
PARECHOVIRUS A RNA CSF QL NAA+NON-PROBE: NEGATIVE
S PNEUM DNA CSF QL NAA+NON-PROBE: NEGATIVE
VZV DNA CSF QL NAA+NON-PROBE: NEGATIVE

## 2025-08-19 ASSESSMENT — ACTIVITIES OF DAILY LIVING (ADL)
ADLS_ACUITY_SCORE: 55
ADLS_ACUITY_SCORE: 55

## 2025-08-21 LAB
BACTERIA CSF CULT: NORMAL
GRAM STAIN RESULT: NORMAL
GRAM STAIN RESULT: NORMAL
PATH REPORT.COMMENTS IMP SPEC: ABNORMAL
PATH REPORT.COMMENTS IMP SPEC: ABNORMAL
PATH REPORT.COMMENTS IMP SPEC: YES
PATH REPORT.FINAL DX SPEC: ABNORMAL
PATH REPORT.GROSS SPEC: ABNORMAL
PATH REPORT.MICROSCOPIC SPEC OTHER STN: ABNORMAL
PATH REPORT.RELEVANT HX SPEC: ABNORMAL

## 2025-08-24 LAB
BACTERIA CSF CULT: NO GROWTH
GRAM STAIN RESULT: NORMAL
GRAM STAIN RESULT: NORMAL